# Patient Record
Sex: MALE | Race: BLACK OR AFRICAN AMERICAN | NOT HISPANIC OR LATINO | Employment: OTHER | URBAN - METROPOLITAN AREA
[De-identification: names, ages, dates, MRNs, and addresses within clinical notes are randomized per-mention and may not be internally consistent; named-entity substitution may affect disease eponyms.]

---

## 2017-01-13 ENCOUNTER — HISTORICAL (OUTPATIENT)
Dept: WOUND CARE | Facility: HOSPITAL | Age: 42
End: 2017-01-13

## 2017-02-03 ENCOUNTER — HISTORICAL (OUTPATIENT)
Dept: WOUND CARE | Facility: HOSPITAL | Age: 42
End: 2017-02-03

## 2017-05-08 ENCOUNTER — HISTORICAL (OUTPATIENT)
Dept: WOUND CARE | Facility: HOSPITAL | Age: 42
End: 2017-05-08

## 2017-05-26 ENCOUNTER — HISTORICAL (OUTPATIENT)
Dept: WOUND CARE | Facility: HOSPITAL | Age: 42
End: 2017-05-26

## 2017-06-09 ENCOUNTER — HISTORICAL (OUTPATIENT)
Dept: WOUND CARE | Facility: HOSPITAL | Age: 42
End: 2017-06-09

## 2017-06-30 ENCOUNTER — HISTORICAL (OUTPATIENT)
Dept: WOUND CARE | Facility: HOSPITAL | Age: 42
End: 2017-06-30

## 2017-07-21 ENCOUNTER — HISTORICAL (OUTPATIENT)
Dept: WOUND CARE | Facility: HOSPITAL | Age: 42
End: 2017-07-21

## 2017-09-08 ENCOUNTER — HISTORICAL (OUTPATIENT)
Dept: WOUND CARE | Facility: HOSPITAL | Age: 42
End: 2017-09-08

## 2017-09-22 ENCOUNTER — HISTORICAL (OUTPATIENT)
Dept: WOUND CARE | Facility: HOSPITAL | Age: 42
End: 2017-09-22

## 2017-10-20 ENCOUNTER — HISTORICAL (OUTPATIENT)
Dept: WOUND CARE | Facility: HOSPITAL | Age: 42
End: 2017-10-20

## 2017-11-13 ENCOUNTER — HISTORICAL (OUTPATIENT)
Dept: WOUND CARE | Facility: HOSPITAL | Age: 42
End: 2017-11-13

## 2018-02-22 ENCOUNTER — HISTORICAL (OUTPATIENT)
Dept: WOUND CARE | Facility: HOSPITAL | Age: 43
End: 2018-02-22

## 2018-03-09 ENCOUNTER — HISTORICAL (OUTPATIENT)
Dept: WOUND CARE | Facility: HOSPITAL | Age: 43
End: 2018-03-09

## 2018-04-06 ENCOUNTER — HISTORICAL (OUTPATIENT)
Dept: WOUND CARE | Facility: HOSPITAL | Age: 43
End: 2018-04-06

## 2018-04-20 ENCOUNTER — HISTORICAL (OUTPATIENT)
Dept: WOUND CARE | Facility: HOSPITAL | Age: 43
End: 2018-04-20

## 2018-05-09 ENCOUNTER — HISTORICAL (OUTPATIENT)
Dept: WOUND CARE | Facility: HOSPITAL | Age: 43
End: 2018-05-09

## 2018-05-25 ENCOUNTER — HISTORICAL (OUTPATIENT)
Dept: WOUND CARE | Facility: HOSPITAL | Age: 43
End: 2018-05-25

## 2018-06-22 ENCOUNTER — HISTORICAL (OUTPATIENT)
Dept: WOUND CARE | Facility: HOSPITAL | Age: 43
End: 2018-06-22

## 2018-07-13 ENCOUNTER — HISTORICAL (OUTPATIENT)
Dept: WOUND CARE | Facility: HOSPITAL | Age: 43
End: 2018-07-13

## 2018-08-03 ENCOUNTER — HISTORICAL (OUTPATIENT)
Dept: WOUND CARE | Facility: HOSPITAL | Age: 43
End: 2018-08-03

## 2018-08-17 ENCOUNTER — HISTORICAL (OUTPATIENT)
Dept: WOUND CARE | Facility: HOSPITAL | Age: 43
End: 2018-08-17

## 2018-08-31 ENCOUNTER — HISTORICAL (OUTPATIENT)
Dept: WOUND CARE | Facility: HOSPITAL | Age: 43
End: 2018-08-31

## 2018-09-21 ENCOUNTER — HISTORICAL (OUTPATIENT)
Dept: WOUND CARE | Facility: HOSPITAL | Age: 43
End: 2018-09-21

## 2018-10-05 ENCOUNTER — HISTORICAL (OUTPATIENT)
Dept: WOUND CARE | Facility: HOSPITAL | Age: 43
End: 2018-10-05

## 2018-10-19 ENCOUNTER — HISTORICAL (OUTPATIENT)
Dept: WOUND CARE | Facility: HOSPITAL | Age: 43
End: 2018-10-19

## 2018-11-16 ENCOUNTER — HISTORICAL (OUTPATIENT)
Dept: WOUND CARE | Facility: HOSPITAL | Age: 43
End: 2018-11-16

## 2018-12-07 ENCOUNTER — HISTORICAL (OUTPATIENT)
Dept: WOUND CARE | Facility: HOSPITAL | Age: 43
End: 2018-12-07

## 2018-12-28 ENCOUNTER — HISTORICAL (OUTPATIENT)
Dept: WOUND CARE | Facility: HOSPITAL | Age: 43
End: 2018-12-28

## 2019-01-18 ENCOUNTER — HISTORICAL (OUTPATIENT)
Dept: WOUND CARE | Facility: HOSPITAL | Age: 44
End: 2019-01-18

## 2019-02-08 ENCOUNTER — HISTORICAL (OUTPATIENT)
Dept: WOUND CARE | Facility: HOSPITAL | Age: 44
End: 2019-02-08

## 2019-02-22 ENCOUNTER — HISTORICAL (OUTPATIENT)
Dept: WOUND CARE | Facility: HOSPITAL | Age: 44
End: 2019-02-22

## 2019-03-22 ENCOUNTER — HISTORICAL (OUTPATIENT)
Dept: WOUND CARE | Facility: HOSPITAL | Age: 44
End: 2019-03-22

## 2019-04-12 ENCOUNTER — HISTORICAL (OUTPATIENT)
Dept: WOUND CARE | Facility: HOSPITAL | Age: 44
End: 2019-04-12

## 2019-05-03 ENCOUNTER — HISTORICAL (OUTPATIENT)
Dept: WOUND CARE | Facility: HOSPITAL | Age: 44
End: 2019-05-03

## 2019-05-17 ENCOUNTER — HISTORICAL (OUTPATIENT)
Dept: WOUND CARE | Facility: HOSPITAL | Age: 44
End: 2019-05-17

## 2019-05-31 ENCOUNTER — HISTORICAL (OUTPATIENT)
Dept: WOUND CARE | Facility: HOSPITAL | Age: 44
End: 2019-05-31

## 2019-06-14 ENCOUNTER — HISTORICAL (OUTPATIENT)
Dept: WOUND CARE | Facility: HOSPITAL | Age: 44
End: 2019-06-14

## 2019-07-26 ENCOUNTER — HISTORICAL (OUTPATIENT)
Dept: WOUND CARE | Facility: HOSPITAL | Age: 44
End: 2019-07-26

## 2019-09-13 ENCOUNTER — HISTORICAL (OUTPATIENT)
Dept: WOUND CARE | Facility: HOSPITAL | Age: 44
End: 2019-09-13

## 2020-01-13 ENCOUNTER — HISTORICAL (OUTPATIENT)
Dept: LAB | Facility: HOSPITAL | Age: 45
End: 2020-01-13

## 2020-01-13 LAB
ABS NEUT (OLG): 4.91 X10(3)/MCL (ref 2.1–9.2)
ALBUMIN SERPL-MCNC: 2.5 GM/DL (ref 3.4–5)
ALBUMIN/GLOB SERPL: 0.5 RATIO (ref 1.1–2)
ALP SERPL-CCNC: 96 UNIT/L (ref 50–136)
ALT SERPL-CCNC: 9 UNIT/L (ref 12–78)
ANISOCYTOSIS BLD QL SMEAR: 1
APPEARANCE, UA: ABNORMAL
AST SERPL-CCNC: 8 UNIT/L (ref 15–37)
BACTERIA SPEC CULT: ABNORMAL /HPF
BASOPHILS # BLD AUTO: 0 X10(3)/MCL (ref 0–0.2)
BASOPHILS NFR BLD AUTO: 0 %
BILIRUB SERPL-MCNC: 0.2 MG/DL (ref 0.2–1)
BILIRUB UR QL STRIP: NEGATIVE
BILIRUBIN DIRECT+TOT PNL SERPL-MCNC: 0.1 MG/DL (ref 0–0.5)
BILIRUBIN DIRECT+TOT PNL SERPL-MCNC: 0.1 MG/DL (ref 0–0.8)
BUN SERPL-MCNC: 8 MG/DL (ref 7–18)
CALCIUM SERPL-MCNC: 8.6 MG/DL (ref 8.5–10.1)
CHLORIDE SERPL-SCNC: 107 MMOL/L (ref 98–107)
CHOLEST SERPL-MCNC: 116 MG/DL (ref 0–200)
CHOLEST/HDLC SERPL: 2.3 {RATIO} (ref 0–5)
CO2 SERPL-SCNC: 25 MMOL/L (ref 21–32)
COLOR UR: YELLOW
CREAT SERPL-MCNC: 0.2 MG/DL (ref 0.7–1.3)
DEPRECATED CALCIDIOL+CALCIFEROL SERPL-MC: 12.65 NG/ML (ref 30–80)
EOSINOPHIL # BLD AUTO: 0.1 X10(3)/MCL (ref 0–0.9)
EOSINOPHIL NFR BLD AUTO: 1 %
ERYTHROCYTE [DISTWIDTH] IN BLOOD BY AUTOMATED COUNT: 21.4 % (ref 11.5–17)
EST. AVERAGE GLUCOSE BLD GHB EST-MCNC: <54 MG/DL
GLOBULIN SER-MCNC: 4.9 GM/DL (ref 2.4–3.5)
GLUCOSE (UA): NEGATIVE
GLUCOSE SERPL-MCNC: 76 MG/DL (ref 74–106)
HBA1C MFR BLD: <3.5 % (ref 4.2–6.3)
HCT VFR BLD AUTO: 31.1 % (ref 42–52)
HDLC SERPL-MCNC: 50 MG/DL (ref 35–60)
HGB BLD-MCNC: 8.1 GM/DL (ref 14–18)
HGB UR QL STRIP: ABNORMAL
HYPOCHROMIA BLD QL SMEAR: 1
IRON SERPL-MCNC: 16 MCG/DL (ref 50–175)
KETONES UR QL STRIP: NEGATIVE
LDLC SERPL CALC-MCNC: 48 MG/DL (ref 0–129)
LEUKOCYTE ESTERASE UR QL STRIP: ABNORMAL
LYMPHOCYTES # BLD AUTO: 2.1 X10(3)/MCL (ref 0.6–4.6)
LYMPHOCYTES NFR BLD AUTO: 27 %
MCH RBC QN AUTO: 16.6 PG (ref 27–31)
MCHC RBC AUTO-ENTMCNC: 26 GM/DL (ref 33–36)
MCV RBC AUTO: 63.9 FL (ref 80–94)
MICROCYTES BLD QL SMEAR: 1
MONOCYTES # BLD AUTO: 0.6 X10(3)/MCL (ref 0.1–1.3)
MONOCYTES NFR BLD AUTO: 7 %
NEUTROPHILS # BLD AUTO: 4.91 X10(3)/MCL (ref 2.1–9.2)
NEUTROPHILS NFR BLD AUTO: 64 %
NITRITE UR QL STRIP: NEGATIVE
PH UR STRIP: 8.5 [PH] (ref 5–9)
PLATELET # BLD AUTO: 784 X10(3)/MCL (ref 130–400)
PLATELET # BLD EST: ABNORMAL 10*3/UL
PMV BLD AUTO: 9 FL (ref 7.4–10.4)
POIKILOCYTOSIS BLD QL SMEAR: 1
POTASSIUM SERPL-SCNC: 5.1 MMOL/L (ref 3.5–5.1)
PROT SERPL-MCNC: 7.4 GM/DL (ref 6.4–8.2)
PROT UR QL STRIP: NEGATIVE
PSA SERPL-MCNC: 0.11 NG/ML (ref 0–4)
RBC # BLD AUTO: 4.87 X10(6)/MCL (ref 4.7–6.1)
RBC #/AREA URNS HPF: 6 /HPF (ref 0–2)
SODIUM SERPL-SCNC: 138 MMOL/L (ref 136–145)
SP GR UR STRIP: 1.01 (ref 1–1.03)
SQUAMOUS EPITHELIAL, UA: ABNORMAL
TRIGL SERPL-MCNC: 92 MG/DL (ref 30–150)
TSH SERPL-ACNC: 1.05 MIU/L (ref 0.36–3.74)
UROBILINOGEN UR STRIP-ACNC: 0.2
VLDLC SERPL CALC-MCNC: 18 MG/DL
WBC # SPEC AUTO: 7.7 X10(3)/MCL (ref 4.5–11.5)
WBC #/AREA URNS HPF: 266 /HPF (ref 0–3)

## 2020-11-20 ENCOUNTER — HISTORICAL (OUTPATIENT)
Dept: WOUND CARE | Facility: HOSPITAL | Age: 45
End: 2020-11-20

## 2021-01-15 ENCOUNTER — HISTORICAL (OUTPATIENT)
Dept: WOUND CARE | Facility: HOSPITAL | Age: 46
End: 2021-01-15

## 2021-05-21 ENCOUNTER — HISTORICAL (OUTPATIENT)
Dept: WOUND CARE | Facility: HOSPITAL | Age: 46
End: 2021-05-21

## 2022-04-11 ENCOUNTER — HISTORICAL (OUTPATIENT)
Dept: ADMINISTRATIVE | Facility: HOSPITAL | Age: 47
End: 2022-04-11

## 2022-04-27 VITALS
OXYGEN SATURATION: 98 % | SYSTOLIC BLOOD PRESSURE: 98 MMHG | HEIGHT: 74 IN | DIASTOLIC BLOOD PRESSURE: 64 MMHG | BODY MASS INDEX: 19.95 KG/M2 | WEIGHT: 155.44 LBS

## 2022-05-05 NOTE — HISTORICAL [facility]ER
This is a historical note converted from Kerline. Formatting and pictures may have been removed.  Please reference Kerline for original formatting and attached multimedia. Chief Complaint  multiple wounds all over body  History of Present Illness  44-year-old?diplegic, who is here for follow-up of his chronic multiple?skin ulcers.? He has no new complaints.? Overall, his ulcers are improving.? He denies fever.  Review of Systems  Constitutional: Negative, other than HPI  Eye: Negative, other than HPI  Ear/Nose/Mouth/Throat: Negative, other than HPI  Respiratory: Negative, other than HPI  Cardiovascular: Negative, other than HPI  Gastrointestinal: Negative, other than HPI  : Negative, other than HPI  MS: Negative, other than HPI  Neuro: Negative, other than HPI  The review of systems is negative, otherwise.  Physical Exam  Vitals & Measurements  T:?36.8? ?C (Oral)? HR:?76(Peripheral)? RR:?20? BP:?153/71?  HT:?187?cm? WT:?79.4?kg?  Incision/Wounds  ?1. Back Midline Pressure ulcer?- last charted: 03/22/2019 12:00  ?? ??Assessment Done By?- Wound Care Team  ?? ??Dressing Type?- Gauze dressing, Petroleum gauze  ?? ??Dressing Assessment?- Drainage present, Intact  ?? ??Dressing Activity?- Changed  ?? ??Length?- 5.0 cm  ?? ??Width?- 6.0 cm  ?? ??Depth?- 0.3 cm  ?? ??Wound Bed Tissue Type?- Granulating, Necrotic tissue, slough  ?? ??Percent Granulated?- 50 %  ?? ??Percent Necrotic Tissue Slough?- 50 %  ?? ??Exudate Amount?- Moderate  ?? ??Exudate Type?- Serosanguineous  ?? ??Exudate Odor?- None  ?? ??Edge?- Attached to wound bed  ?? ??Status?- Improving  ?  ?2. Right Other: Forearm Pressure ulcer?- last charted: 03/22/2019 12:00  ?? ??Assessment Done By?- Wound Care Team  ?? ??Pressure Point?- Bony prominence  ?? ??Dressing Type?- Gauze dressing, Petroleum gauze  ?? ??Dressing Assessment?- Drainage present, Intact  ?? ??Dressing Activity?- Changed  ?? ??Cleansing?- Cleaned with normal saline  ?? ??Length?- 14.5 cm  ??  ??Width?- 3.5 cm  ?? ??Depth?- 0.4 cm  ?? ??Wound Bed Tissue Type?- Granulating, Necrotic tissue, slough  ?? ??Percent Granulated?- 75 %  ?? ??Percent Necrotic Tissue Slough?- 25 %  ?? ??Exudate Amount?- Small  ?? ??Exudate Type?- Serosanguineous  ?? ??Exudate Odor?- None  ?? ??Edge?- Attached to wound bed  ?? ??Status?- Improving  ?  ?3. Hip Left Pressure ulcer?- last charted: 03/22/2019 12:00  ?? ??Assessment Done By?- Wound Care Team  ?? ??Pressure Point?- Bony prominence  ?? ??Dressing Type?- Gauze dressing, Petroleum gauze  ?? ??Dressing Assessment?- Drainage present, Intact  ?? ??Dressing Activity?- Changed  ?? ??Cleansing?- Cleaned with normal saline  ?? ??Length?- 6.2 cm  ?? ??Width?- 7.0 cm  ?? ??Depth?- 0.4 cm  ?? ??Wound Bed Tissue Type?- Granulating, Necrotic tissue, slough  ?? ??Percent Granulated?- 75 %  ?? ??Percent Necrotic Tissue Slough?- 25 %  ?? ??Exudate Amount?- Moderate  ?? ??Exudate Type?- Serosanguineous  ?? ??Exudate Odor?- None  ?? ??Edge?- Attached to wound bed  ?? ??Status?- Improving  ?  ?4. Right Other: [place] Pressure ulcer?- last charted: 03/22/2019 12:00  ?? ??Assessment Done By?- Wound Care Team  ?? ??Pressure Point?- Bony prominence  ?? ??Dressing Type?- Gauze dressing, Petroleum gauze  ?? ??Dressing Assessment?- Drainage present, Intact  ?? ??Dressing Activity?- Changed  ?? ??Length?- 28.5 cm  ?? ??Width?- 4.5 cm  ?? ??Depth?- 0.5 cm  ?? ??Wound Bed Tissue Type?- Granulating  ?? ??Percent Granulated?- 100 %  ?? ??Exudate Amount?- Moderate  ?? ??Exudate Type?- Serosanguineous  ?? ??Exudate Odor?- None  ?? ??Edge?- Attached to wound bed  ?? ??Status?- Improving  ?  ?5. Left Other: [place] Pressure ulcer?- last charted: 03/22/2019 12:00  ?? ??Assessment Done By?- Wound Care Team  ?? ??Pressure Point?- Bony prominence  ?? ??Dressing Type?- Gauze dressing, Petroleum gauze  ?? ??Dressing Assessment?- Drainage present, Intact  ?? ??Dressing Activity?- Changed  ?? ??Length?- 6.9 cm  ??  ??Width?- 6.0 cm  ?? ??Depth?- 0.5 cm  ?? ??Wound Bed Tissue Type?- Granulating  ?? ??Percent Granulated?- 100 %  ?? ??Exudate Amount?- Moderate  ?? ??Exudate Type?- Serosanguineous  ?? ??Exudate Odor?- None  ?? ??Edge?- Attached to wound bed  ?? ??Status?- Improving  ?  ?6. Knee Right Pressure ulcer?- last charted: 03/22/2019 12:00  ?? ??Assessment Done By?- Wound Care Team  ?? ??Dressing Type?- Gauze dressing, Petroleum gauze  ?? ??Dressing Assessment?- Drainage present, Intact  ?? ??Dressing Activity?- Changed  ?? ??Cleansing?- Cleaned with soap and water  ?? ??Length?- 2.5 cm  ?? ??Width?- 1.5 cm  ?? ??Depth?- 0.2 cm  ?? ??Wound Bed Tissue Type?- Granulating, Necrotic tissue, slough  ?? ??Percent Granulated?- 50 %  ?? ??Percent Necrotic Tissue Slough?- 50 %  ?? ??Exudate Amount?- Small  ?? ??Exudate Type?- Serosanguineous  ?? ??Exudate Odor?- None  ?? ??Edge?- Attached to wound bed  ?? ??Status?- Improving  ?  ?7. Foot Right Pressure ulcer?- last charted: 03/22/2019 12:00  ?? ??Assessment Done By?- Wound Care Team  ?? ??Dressing Type?- None  ?? ??Length?- 0 cm  ?? ??Width?- 0 cm  ?? ??Depth?- 0 cm  ?? ??Wound Bed Tissue Type?- Epithelialized  ?? ??Percent Epithelialized?- 100 %  ?? ??Status?- Healed  ?  ?8. Leg Left Pressure ulcer?- last charted: 03/22/2019 12:00  ?? ??Assessment Done By?- Wound Care Team  ?? ??Dressing Type?- Gauze dressing, Petroleum gauze  ?? ??Dressing Assessment?- Drainage present, Intact  ?? ??Dressing Activity?- Changed  ?? ??Length?- 2.8 cm  ?? ??Width?- 1.5 cm  ?? ??Depth?- 0.1 cm  ?? ??Wound Bed Tissue Type?- Granulating  ?? ??Percent Granulated?- 100 %  ?? ??Exudate Type?- Serosanguineous  ?? ??Exudate Odor?- None  ?? ??Edge?- Attached to wound bed  ?? ??Status?- Improving  ?  ?9. Elbow Right Pressure ulcer?- last charted: 03/22/2019 12:00  ?? ??Assessment Done By?- Wound Care Team  ?? ??Pressure Point?- Bony prominence  ?? ??Dressing Type?- Gauze dressing, Petroleum gauze  ??  ??Dressing Assessment?- Drainage present, Intact  ?? ??Dressing Activity?- Changed  ?? ??Cleansing?- Cleaned with normal saline  ?? ??Length?- 3.0 cm  ?? ??Width?- 1.5 cm  ?? ??Depth?- 0.2 cm  ?? ??Wound Bed Tissue Type?- Granulating, Necrotic tissue, slough  ?? ??Percent Granulated?- 90 %  ?? ??Percent Necrotic Tissue Slough?- 10 %  ?? ??Exudate Amount?- Moderate  ?? ??Exudate Type?- Serosanguineous  ?? ??Exudate Odor?- None  ?? ??Edge?- Attached to wound bed  ?? ??Status?- Improving  ?  ?  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 Santyl with topical antibiotics with 4 x 4s, tape to left hip wound daily. All of the wounds pat...  ?  Pressure ulcer of left hip, stage 3?L89.223  Ordered:  Wound Care Team Treatment, 03/22/19 13:26:00 CDT, Stop date 03/22/19 13:26:00 CDT, Right foot:, To use topical antibiotics with Mepitel nonstick with 4 x 4s, Kerlix, tape daily. Santyl with topical antibiotics with 4 x 4s, tape to left hip wound daily. All of the wounds pat...  ?  Pressure ulcer of other site, stage 2?L89.892  Ordered:  Wound Care Team Treatment, 03/22/19 13:26:00 CDT, Stop date 03/22/19 13:26:00 CDT, Right foot:, To use topical antibiotics with Mepitel nonstick with 4 x 4s, Kerlix, tape daily. Santyl with topical antibiotics with 4 x 4s, tape to left hip wound daily. All of the wounds pat...  ?  Pressure ulcer of other site, stage 3?L89.893  Ordered:  Wound Care Team Treatment, 03/22/19 13:26:00 CDT, Stop date 03/22/19 13:26:00 CDT, Right foot:, To use topical antibiotics with Mepitel nonstick with 4 x 4s, Kerlix, tape daily. Santyl with topical antibiotics with 4 x 4s, tape to left hip wound daily. All of the wounds pat...  ?  Pressure ulcer of right elbow, stage  3?L89.013  Ordered:  Wound Care Team Treatment, 03/22/19 13:26:00 CDT, Stop date 03/22/19 13:26:00 CDT, Right foot:, To use topical antibiotics with Mepitel nonstick with 4 x 4s, Kerlix, tape daily. Santyl with topical antibiotics with 4 x 4s, tape to left hip wound daily. All of the wounds pat...  ?  Quadriplegia, unspecified?G82.50  ? The Norco 10 mg?was refilled,?1 every 6 hours as needed.? He was given a prescription for 56 tablets.  Ordered:  Wound Care Team Treatment, 03/22/19 13:26:00 CDT, Stop date 03/22/19 13:26:00 CDT, Right foot:, To use topical antibiotics with Mepitel nonstick with 4 x 4s, Kerlix, tape daily. Santyl with topical antibiotics with 4 x 4s, tape to left hip wound daily. All of the wounds pat...  ?  Orders:  acetaminophen-HYDROcodone, 1 tab(s), Oral, q6hr, PRN PRN as needed for pain, X 14 day(s), # 56 tab(s), 0 Refill(s), Pharmacy: [id number]  Wound Care Outpatient, 04/05/19 10:30:00 CDT, Stop date 04/05/19 10:30:00 CDT  Wound Care Outpatient, *Est. 04/05/19 3:00:00 CDT, *Est. Stop date 04/05/19 3:00:00 CDT, [facility], Return to Clinic 2 weeks   Problem List/Past Medical History  Ongoing  Anxiety  Flu vaccine need  Hospital discharge follow-up  Insomnia(  Confirmed  )  Joint pain(  Confirmed  )  Lipid screening  Need for Tdap vaccination  Preventative health care  Screening PSA (prostate specific antigen)  Spinal cord injury(  Confirmed  )  Starvation-related malnutrition  UTI (urinary tract infection), bacterial  Historical  Acute URI  Anemia  Hypotension  Nausea  Paraplegia(  Confirmed  )  Quadriplegia  Procedure/Surgical History  Esophagogastroduodenoscopy (09/13/2016)  Debridement Sacral Decubitus (09/12/2016)  arm  Back Surgery  backside  Colostomy  foot  Hip surgery  neck  Neck Surgery  Peg placement & removal  Thigh Surgery  trach  Trach placement & reversal   Medications  baclofen 10 mg oral tablet, 10 mg= 1 tab(s), Oral, TID, PRN, 3 refills  Banophen  25 mg oral tablet, 25 mg= 1 tab(s), Oral, QID, PRN  Catheter Bags #[id number], See Instructions, 11 refills  Condom Cathether 35mm/Large REf #[id number]/[id number], See Instructions, 11 refills  Extention Tubing #[id number], See Instructions, 11 refills  Leg Bag #[id number], See Instructions, 11 refills  Minocin 100 mg oral capsule, 100 mg= 1 cap(s), Oral, BID, 5 refills  Norco 10 mg-325 mg oral tablet, 1 tab(s), Oral, q6hr, PRN  Ventolin HFA 90 mcg/inh inhalation aerosol, 2 puff(s), INH, q6hr, PRN  Vitamin C 500 mg oral tablet, 500 mg= 1 tab(s), Oral, Daily  Zinc, See Instructions  zolpidem 10 mg oral tablet, 10 mg= 1 tab(s), Oral, Once a day (at bedtime), PRN, 5 refills  Allergies  iodine  penicillins  sulfa drugs  Social History  Alcohol - Denies Alcohol Use, 08/13/2014  Never, 06/16/2015  Employment/School  disabled, 06/16/2015  Exercise  Home/Environment  Alcohol abuse in household: No. Substance abuse in household: No. Smoker in household: No. Injuries/Abuse/Neglect in household: No. Feels unsafe at home: No. Safe place to go: Yes. Agency(s)/Others notified: No. Family/Friends available for support: Yes. Concern for family members at home: No., 06/01/2016  Lives with family., 06/16/2015  Nutrition/Health  Regular, 06/16/2015  Sexual  Sexually active: No., 06/16/2015  Substance Abuse - Denies Substance Abuse, 08/13/2014  Never, 06/16/2015  Tobacco - Denies Tobacco Use, 08/13/2014  Never (less than 100 in lifetime), N/A, 03/22/2019  Former smoker, 06/01/2016  Family History  Diabetes mellitus type 2: Father.  Hypertension.: Mother, Father, Sister and Brother.  Hypothyroid: Sister.  Immunizations  Vaccine Date Status Comments   influenza virus vaccine, inactivated 10/22/2018 Given pt tolerated well   influenza virus vaccine, inactivated - Not Given Patient Refuses     tetanus/diphtheria/pertussis, acel(Tdap) - Not Given Patient Refuses     influenza virus vaccine, inactivated 10/22/2013 Recorded    Health Maintenance  Health  Maintenance  ???Pending?(in the next year)  ??? ??Due?  ??? ? ? ?Lipid Screening due??03/22/19??and every?  ??? ??Due In Future?  ??? ? ? ?Depression Screening not due until??04/23/19??and every 1??year(s)  ??? ? ? ?Alcohol Misuse Screening not due until??04/23/19??and every 1??year(s)  ??? ? ? ?Blood Pressure Screening not due until??10/22/19??and every 1??year(s)  ??? ? ? ?Body Mass Index Check not due until??10/22/19??and every 1??year(s)  ??? ? ? ?Obesity Screening not due until??10/22/19??and every 1??year(s)  ???Satisfied?(in the past 1 year)  ??? ??Satisfied?  ??? ? ? ?ADL Screening on??03/22/19.??Satisfied by Yuliya Ivy RN  ??? ? ? ?Alcohol Misuse Screening on??04/23/18.??Satisfied by Sarahy Ferris LPN  ??? ? ? ?Blood Pressure Screening on??03/22/19.??Satisfied by Yuliya Ivy RN  ??? ? ? ?Body Mass Index Check on??10/22/18.??Satisfied by Aline Jain  ??? ? ? ?Depression Screening on??04/23/18.??Satisfied by Sarahy Ferris LPN  ??? ? ? ?Influenza Vaccine on??10/22/18.??Satisfied by Ev Bradley  ??? ? ? ?Obesity Screening on??10/22/18.??Satisfied by Aline Jain  ?  ?

## 2022-05-05 NOTE — HISTORICAL [facility]
This is a historical note converted from [employer]. Formatting and pictures may have been removed.  Please reference [employer] for original formatting and attached multimedia. Chief Complaint  back, leg, foot, back, ischial wounds  History of Present Illness  43 yo male with bakc, leg, ishcial wounds.  Review of Systems  Constitutional:?no fever, fatigue, weakness  ENMT: no?nasal congestion/drainage  Respiratory:?no shortness of breath  Cardiovascular:?no chest pain, no edema  Gastrointestinal:?no nausea, vomiting  Hema/Lymph:?no abnormal bruising or bleeding  Musculoskeletal:?no muscle or joint pain, no joint swelling  Integumentary: bakc wound, ischial wound, elbow, arm, leg wounds  ?  ?  Physical Exam  Vitals & Measurements  T:?36.8? ?C (Oral)? HR:?73(Peripheral)? RR:?20? BP:?143/76?  HT:?187?cm? WT:?79.4?kg?  Incision/Wounds  ?1. Back Midline Pressure ulcer?- last charted: 04/12/2019 10:00  ?? ??Assessment Done By?- Wound Care Team  ?? ??Pressure Point?- Bony prominence  ?? ??Dressing Type?- Contact Layer, Gauze dressing  ?? ??Dressing Assessment?- Drainage present, Intact  ?? ??Dressing Activity?- Removed  ?? ??Cleansing?- Cleaned with normal saline  ?? ??Length?- 6.0 cm  ?? ??Width?- 4.0 cm  ?? ??Depth?- 0.2 cm  ?? ??Wound Bed Tissue Type?- Granulating  ?? ??Percent Granulated?- 100 %  ?? ??Pressure Ulcer Stage?- II  ?? ??Exudate Amount?- Small  ?? ??Exudate Type?- Serous  ?? ??Edge?- Attached to wound bed  ?? ??Status?- Improving  ?  ?2. Right Other: Forearm Pressure ulcer?- last charted: 04/12/2019 10:00  ?? ??Assessment Done By?- Wound Care Team  ?? ??Pressure Point?- Bony prominence  ?? ??Dressing Type?- Contact Layer, Gauze dressing  ?? ??Dressing Assessment?- Drainage present, Intact  ?? ??Dressing Activity?- Removed  ?? ??Cleansing?- Cleaned with normal saline  ?? ??Length?- 13.0 cm  ?? ??Width?- 1.5 cm  ?? ??Depth?- 0.3 cm  ?? ??Wound Bed Tissue Type?- Granulating, Necrotic tissue, slough  ?? ??Percent  Granulated?- 90 %  ?? ??Percent Necrotic Tissue Slough?- 10 %  ?? ??Pressure Ulcer Stage?- III  ?? ??Exudate Amount?- Moderate  ?? ??Exudate Type?- Serous  ?? ??Exudate Odor?- None  ?? ??Edge?- Attached to wound bed  ?? ??Status?- Improving  ?  ?3. Hip Left Pressure ulcer?- last charted: 04/12/2019 10:00  ?? ??Assessment Done By?- Wound Care Team  ?? ??Pressure Point?- Bony prominence  ?? ??Dressing Type?- Contact Layer, Gauze dressing  ?? ??Dressing Assessment?- Drainage present, Intact  ?? ??Dressing Activity?- Removed  ?? ??Cleansing?- Cleaned with normal saline  ?? ??Length?- 6.5 cm  ?? ??Width?- 7.2 cm  ?? ??Depth?- 0.3 cm  ?? ??Wound Bed Tissue Type?- Granulating, Necrotic tissue, slough  ?? ??Percent Granulated?- 75 %  ?? ??Percent Necrotic Tissue Slough?- 25 %  ?? ??Pressure Ulcer Stage?- III  ?? ??Exudate Amount?- Moderate  ?? ??Exudate Type?- Serous  ?? ??Exudate Odor?- None  ?? ??Edge?- Attached to wound bed  ?? ??Status?- Improving  ?  ?4. Right Other: Ischium Pressure ulcer?- last charted: 04/12/2019 10:00  ?? ??Assessment Done By?- Wound Care Team  ?? ??Pressure Point?- Bony prominence  ?? ??Dressing Type?- Contact Layer, Gauze dressing  ?? ??Dressing Assessment?- Drainage present, Intact  ?? ??Dressing Activity?- Removed  ?? ??Cleansing?- Cleaned with normal saline  ?? ??Length?- 31.5 cm  ?? ??Width?- 6.0 cm  ?? ??Depth?- 0.4 cm  ?? ??Wound Bed Tissue Type?- Granulating, Necrotic tissue, slough  ?? ??Percent Granulated?- 90 %  ?? ??Percent Necrotic Tissue Slough?- 10 %  ?? ??Pressure Ulcer Stage?- IV  ?? ??Exudate Amount?- Moderate  ?? ??Exudate Type?- Serous  ?? ??Exudate Odor?- None  ?? ??Edge?- Attached to wound bed  ?? ??Status?- Improving  ?  ?5. Left Other: Ischium Pressure ulcer?- last charted: 04/12/2019 10:00  ?? ??Assessment Done By?- Wound Care Team  ?? ??Pressure Point?- Bony prominence  ?? ??Dressing Type?- Contact Layer, Gauze dressing  ?? ??Dressing Assessment?- Drainage present,  Intact  ?? ??Dressing Activity?- Removed  ?? ??Cleansing?- Cleaned with normal saline  ?? ??Length?- 6.0 cm  ?? ??Width?- 11.0 cm  ?? ??Depth?- 0.3 cm  ?? ??Wound Bed Tissue Type?- Granulating, Necrotic tissue, eschar  ?? ??Percent Granulated?- 50 %  ?? ??Percent Necrotic Tissue Eschar?- 50 %  ?? ??Pressure Ulcer Stage?- III  ?? ??Exudate Amount?- Moderate  ?? ??Exudate Type?- Serous  ?? ??Exudate Odor?- None  ?? ??Edge?- Attached to wound bed  ?? ??Status?- Improving  ?  ?6. Knee Right Pressure ulcer?- last charted: 04/12/2019 10:00  ?? ??Assessment Done By?- Wound Care Team  ?? ??Dressing Type?- Contact Layer, Gauze dressing  ?? ??Dressing Assessment?- Drainage present, Intact  ?? ??Dressing Activity?- Removed  ?? ??Cleansing?- Cleaned with normal saline  ?? ??Length?- 2.5 cm  ?? ??Width?- 1.8 cm  ?? ??Depth?- 0.3 cm  ?? ??Wound Bed Tissue Type?- Granulating  ?? ??Percent Granulated?- 100 %  ?? ??Exudate Amount?- Moderate  ?? ??Exudate Type?- Serous  ?? ??Exudate Odor?- None  ?? ??Edge?- Attached to wound bed  ?? ??Status?- Improving  ?  ?7. Leg Left Pressure ulcer?- last charted: 04/12/2019 10:00  ?? ??Assessment Done By?- Wound Care Team  ?? ??Pressure Point?- Bony prominence  ?? ??Dressing Type?- Contact Layer, Gauze dressing  ?? ??Dressing Assessment?- Drainage present, Intact  ?? ??Dressing Activity?- Removed  ?? ??Cleansing?- Cleaned with normal saline  ?? ??Length?- 1.5 cm  ?? ??Width?- 0.7 cm  ?? ??Depth?- 0.3 cm  ?? ??Wound Bed Tissue Type?- Granulating  ?? ??Percent Granulated?- 100 %  ?? ??Pressure Ulcer Stage?- II  ?? ??Exudate Amount?- Small  ?? ??Exudate Type?- Serous  ?? ??Exudate Odor?- None  ?? ??Edge?- Attached to wound bed  ?? ??Status?- Improving  ?  ?8. Elbow Right Pressure ulcer?- last charted: 04/12/2019 10:00  ?? ??Assessment Done By?- Wound Care Team  ?? ??Pressure Point?- Bony prominence  ?? ??Dressing Type?- Contact Layer, Gauze dressing  ?? ??Dressing Assessment?- Drainage present,  Intact  ?? ??Dressing Activity?- Removed  ?? ??Cleansing?- Cleaned with normal saline  ?? ??Length?- 2.0 cm  ?? ??Width?- 1.5 cm  ?? ??Depth?- 0.3 cm  ?? ??Wound Bed Tissue Type?- Hypergranular  ?? ??Percent Granulated?- 100 %  ?? ??Pressure Ulcer Stage?- III  ?? ??Exudate Amount?- Moderate  ?? ??Exudate Type?- Serous  ?? ??Exudate Odor?- None  ?? ??Edge?- Attached to wound bed  ?? ??Status?- Improving  Open wound on the right elbow is hyper granulated and silver nitrate is applied to?this clean wound.? Left hip pressure ulcer and left ischium?pressure ulcer or being treated with Santyl?and gauze dressing.? The remainder of his wounds are covered with?Vaseline gauze and AVD pads?with?gauze dressings.? All wounds are clean and without evidence of?slough so that no debridement?is required.? No sign of infection or drainage.  ?  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 Mepilex foam non border to all other? wounds with dry dressings daily. RTC 3 weeks.   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, stage 4  Pressure ulcer of left hip, stage 3  Preventative health care  Screening PSA (prostate specific antigen)  Spinal cord injury(  Confirmed  )  Starvation-related malnutrition  UTI (urinary tract infection), bacterial  Historical  Acute URI  Anemia  Hypotension  Nausea  Paraplegia(  Confirmed  )  Quadriplegia  Procedure/Surgical History  Debridement (eg, high pressure waterjet with/without suction,  sharp selective debridement with scissors, scalpel and forceps), open wound, (eg, fibrin, devitalized epidermis and/or dermis, exudate, debris, biofilm), including topical application(s), wound (02/08/2019)  Extraction of Left Upper Leg Skin, External Approach (02/08/2019)  Debridement, subcutaneous tissue (includes epidermis and dermis, if performed); first 20 sq cm or less (01/18/2019)  Excision of Left Upper Leg Subcutaneous Tissue and Fascia, Open Approach (01/18/2019)  Debridement (eg, high pressure waterjet with/without suction, sharp selective debridement with scissors, scalpel and forceps), open wound, (eg, fibrin, devitalized epidermis and/or dermis, exudate, debris, biofilm), including topical application(s), wound (12/28/2018)  Extraction of Left Upper Leg Skin, External Approach (12/28/2018)  Extraction of Right Foot Skin, External Approach (12/28/2018)  Extraction of Right Lower Arm Skin, External Approach (12/28/2018)  Debridement (eg, high pressure waterjet with/without suction, sharp selective debridement with scissors, scalpel and forceps), open wound, (eg, fibrin, devitalized epidermis and/or dermis, exudate, debris, biofilm), including topical application(s), wound (11/16/2018)  Extraction of Buttock Skin, External Approach (11/16/2018)  Extraction of Right Foot Skin, External Approach (11/16/2018)  Extraction of Right Lower Arm Skin, External Approach (11/16/2018)  Extraction of Right Lower Leg Skin, External Approach (11/16/2018)  Debridement (eg, high pressure waterjet with/without suction, sharp selective debridement with scissors, scalpel and forceps), open wound, (eg, fibrin, devitalized epidermis and/or dermis, exudate, debris, biofilm), including topical application(s), wound (10/19/2018)  Extraction of Buttock Skin, External Approach (10/19/2018)  Extraction of Right Foot Skin, External Approach (10/19/2018)  Extraction of Right Lower Leg Skin, External Approach (10/19/2018)  Debridement  (eg, high pressure waterjet with/without suction, sharp selective debridement with scissors, scalpel and forceps), open wound, (eg, fibrin, devitalized epidermis and/or dermis, exudate, debris, biofilm), including topical application(s), wound (10/05/2018)  Debridement (eg, high pressure waterjet with/without suction, sharp selective debridement with scissors, scalpel and forceps), open wound, (eg, fibrin, devitalized epidermis and/or dermis, exudate, debris, biofilm), including topical application(s), wound (10/05/2018)  Debridement (eg, high pressure waterjet with/without suction, sharp selective debridement with scissors, scalpel and forceps), open wound, (eg, fibrin, devitalized epidermis and/or dermis, exudate, debris, biofilm), including topical application(s), wound (10/05/2018)  Extraction of Back Skin, External Approach (10/05/2018)  Extraction of Chest Skin, External Approach (10/05/2018)  Extraction of Right Foot Skin, External Approach (10/05/2018)  Extraction of Right Lower Arm Skin, External Approach (10/05/2018)  Extraction of Right Upper Arm Skin, External Approach (10/05/2018)  Chemical cauterization of granulation tissue (ie, proud flesh) (07/13/2018)  Destruction of Back Skin, External Approach (07/13/2018)  Destruction of Chest Skin, External Approach (07/13/2018)  Destruction of Right Upper Arm Skin, External Approach (07/13/2018)  Debridement (eg, high pressure waterjet with/without suction, sharp selective debridement with scissors, scalpel and forceps), open wound, (eg, fibrin, devitalized epidermis and/or dermis, exudate, debris, biofilm), including topical application(s), wound (05/09/2018)  Extraction of Back Skin, External Approach (05/09/2018)  Extraction of Chest Skin, External Approach (05/09/2018)  Extraction of Right Upper Arm Skin, External Approach (05/09/2018)  Extraction of Right Upper Leg Skin, External Approach (05/09/2018)  Chemical cauterization of granulation tissue (ie,  proud flesh) (03/09/2018)  Destruction of Back Skin, External Approach (03/09/2018)  Destruction of Chest Skin, External Approach (03/09/2018)  Destruction of Right Upper Arm Skin, External Approach (03/09/2018)  Transfusion of Nonautologous Red Blood Cells into Peripheral Vein, Percutaneous Approach (01/24/2018)  Insertion of Infusion Device into Upper Vein, Percutaneous Approach (01/23/2018)  Chemical cauterization of granulation tissue (proud flesh, sinus or fistula) (11/13/2017)  Debridement (eg, high pressure waterjet with/without suction, sharp selective debridement with scissors, scalpel and forceps), open wound, (eg, fibrin, devitalized epidermis and/or dermis, exudate, debris, biofilm), including topical application(s), wound (11/13/2017)  Destruction of Back Skin, External Approach (11/13/2017)  Extraction of Left Lower Leg Skin, External Approach (11/13/2017)  Debridement (eg, high pressure waterjet with/without suction, sharp selective debridement with scissors, scalpel and forceps), open wound, (eg, fibrin, devitalized epidermis and/or dermis, exudate, debris, biofilm), including topical application(s), wound (10/20/2017)  Debridement (eg, high pressure waterjet with/without suction, sharp selective debridement with scissors, scalpel and forceps), open wound, (eg, fibrin, devitalized epidermis and/or dermis, exudate, debris, biofilm), including topical application(s), wound (10/20/2017)  Extraction of Back Skin, External Approach (10/20/2017)  Debridement (eg, high pressure waterjet with/without suction, sharp selective debridement with scissors, scalpel and forceps), open wound, (eg, fibrin, devitalized epidermis and/or dermis, exudate, debris, biofilm), including topical application(s), wound (09/22/2017)  Debridement (eg, high pressure waterjet with/without suction, sharp selective debridement with scissors, scalpel and forceps), open wound, (eg, fibrin, devitalized epidermis and/or dermis, exudate,  debris, biofilm), including topical application(s), wound (09/22/2017)  Debridement (eg, high pressure waterjet with/without suction, sharp selective debridement with scissors, scalpel and forceps), open wound, (eg, fibrin, devitalized epidermis and/or dermis, exudate, debris, biofilm), including topical application(s), wound (09/22/2017)  Debridement (eg, high pressure waterjet with/without suction, sharp selective debridement with scissors, scalpel and forceps), open wound, (eg, fibrin, devitalized epidermis and/or dermis, exudate, debris, biofilm), including topical application(s), wound (09/22/2017)  Debridement (eg, high pressure waterjet with/without suction, sharp selective debridement with scissors, scalpel and forceps), open wound, (eg, fibrin, devitalized epidermis and/or dermis, exudate, debris, biofilm), including topical application(s), wound (09/22/2017)  Debridement (eg, high pressure waterjet with/without suction, sharp selective debridement with scissors, scalpel and forceps), open wound, (eg, fibrin, devitalized epidermis and/or dermis, exudate, debris, biofilm), including topical application(s), wound (09/22/2017)  Debridement (eg, high pressure waterjet with/without suction, sharp selective debridement with scissors, scalpel and forceps), open wound, (eg, fibrin, devitalized epidermis and/or dermis, exudate, debris, biofilm), including topical application(s), wound (09/22/2017)  Debridement (eg, high pressure waterjet with/without suction, sharp selective debridement with scissors, scalpel and forceps), open wound, (eg, fibrin, devitalized epidermis and/or dermis, exudate, debris, biofilm), including topical application(s), wound (09/22/2017)  Debridement (eg, high pressure waterjet with/without suction, sharp selective debridement with scissors, scalpel and forceps), open wound, (eg, fibrin, devitalized epidermis and/or dermis, exudate, debris, biofilm), including topical application(s), wound  (09/22/2017)  Debridement, subcutaneous tissue (includes epidermis and dermis, if performed); each additional 20 sq cm, or part thereof (List separately in addition to code for primary procedure) (09/22/2017)  Debridement, subcutaneous tissue (includes epidermis and dermis, if performed); each additional 20 sq cm, or part thereof (List separately in addition to code for primary procedure) (09/22/2017)  Debridement, subcutaneous tissue (includes epidermis and dermis, if performed); each additional 20 sq cm, or part thereof (List separately in addition to code for primary procedure) (09/22/2017)  Debridement, subcutaneous tissue (includes epidermis and dermis, if performed); first 20 sq cm or less (09/22/2017)  Excision of Back Subcutaneous Tissue and Fascia, Open Approach (09/22/2017)  Extraction of Left Foot Skin, External Approach (09/22/2017)  Extraction of Right Lower Arm Skin, External Approach (09/22/2017)  Extraction of Right Upper Leg Skin, External Approach (09/22/2017)  Debridement (eg, high pressure waterjet with/without suction, sharp selective debridement with scissors, scalpel and forceps), open wound, (eg, fibrin, devitalized epidermis and/or dermis, exudate, debris, biofilm), including topical application(s), wound (09/08/2017)  Debridement (eg, high pressure waterjet with/without suction, sharp selective debridement with scissors, scalpel and forceps), open wound, (eg, fibrin, devitalized epidermis and/or dermis, exudate, debris, biofilm), including topical application(s), wound (09/08/2017)  Debridement (eg, high pressure waterjet with/without suction, sharp selective debridement with scissors, scalpel and forceps), open wound, (eg, fibrin, devitalized epidermis and/or dermis, exudate, debris, biofilm), including topical application(s), wound (09/08/2017)  Debridement (eg, high pressure waterjet with/without suction, sharp selective debridement with scissors, scalpel and forceps), open wound, (eg,  fibrin, devitalized epidermis and/or dermis, exudate, debris, biofilm), including topical application(s), wound (09/08/2017)  Extraction of Buttock Skin, External Approach (09/08/2017)  Extraction of Left Foot Skin, External Approach (09/08/2017)  Extraction of Left Upper Leg Skin, External Approach (09/08/2017)  Extraction of Right Lower Arm Skin, External Approach (09/08/2017)  Extraction of Right Upper Arm Skin, External Approach (09/08/2017)  Debridement (eg, high pressure waterjet with/without suction, sharp selective debridement with scissors, scalpel and forceps), open wound, (eg, fibrin, devitalized epidermis and/or dermis, exudate, debris, biofilm), including topical application(s), wound (07/21/2017)  Extraction of Left Foot Skin, External Approach (07/21/2017)  Chemical cauterization of granulation tissue (proud flesh, sinus or fistula) (06/30/2017)  Debridement (eg, high pressure waterjet with/without suction, sharp selective debridement with scissors, scalpel and forceps), open wound, (eg, fibrin, devitalized epidermis and/or dermis, exudate, debris, biofilm), including topical application(s), wound (06/30/2017)  Debridement, subcutaneous tissue (includes epidermis and dermis, if performed); first 20 sq cm or less (06/30/2017)  Destruction of Right Lower Arm Skin, External Approach (06/30/2017)  Excision of Back Subcutaneous Tissue and Fascia, Open Approach (06/30/2017)  Extraction of Buttock Skin, External Approach (06/30/2017)  Chemical cauterization of granulation tissue (proud flesh, sinus or fistula) (06/09/2017)  Debridement, subcutaneous tissue (includes epidermis and dermis, if performed); first 20 sq cm or less (06/09/2017)  Destruction of Buttock Skin, External Approach (06/09/2017)  Destruction of Left Upper Leg Skin, External Approach (06/09/2017)  Destruction of Right Upper Arm Skin, External Approach (06/09/2017)  Excision of Back Subcutaneous Tissue and Fascia, Open Approach  (06/09/2017)  Chemical cauterization of granulation tissue (proud flesh, sinus or fistula) (05/26/2017)  Debridement (eg, high pressure waterjet with/without suction, sharp selective debridement with scissors, scalpel and forceps), open wound, (eg, fibrin, devitalized epidermis and/or dermis, exudate, debris, biofilm), including topical application(s), wound (05/26/2017)  Destruction of Right Upper Arm Skin, External Approach (05/26/2017)  Extraction of Back Skin, External Approach (05/26/2017)  Transfusion of Nonautologous Red Blood Cells into Peripheral Vein, Percutaneous Approach (04/24/2017)  Debridement (eg, high pressure waterjet with/without suction, sharp selective debridement with scissors, scalpel and forceps), open wound, (eg, fibrin, devitalized epidermis and/or dermis, exudate, debris, biofilm), including topical application(s), wound (12/12/2016)  Extraction of Right Lower Arm Skin, External Approach (12/12/2016)  Debridement (eg, high pressure waterjet with/without suction, sharp selective debridement with scissors, scalpel and forceps), open wound, (eg, fibrin, devitalized epidermis and/or dermis, exudate, debris, biofilm), including topical application(s), wound (11/23/2016)  Debridement (eg, high pressure waterjet with/without suction, sharp selective debridement with scissors, scalpel and forceps), open wound, (eg, fibrin, devitalized epidermis and/or dermis, exudate, debris, biofilm), including topical application(s), wound (11/23/2016)  Debridement (eg, high pressure waterjet with/without suction, sharp selective debridement with scissors, scalpel and forceps), open wound, (eg, fibrin, devitalized epidermis and/or dermis, exudate, debris, biofilm), including topical application(s), wound (11/23/2016)  Debridement (eg, high pressure waterjet with/without suction, sharp selective debridement with scissors, scalpel and forceps), open wound, (eg, fibrin, devitalized epidermis and/or dermis, exudate,  debris, biofilm), including topical application(s), wound (11/23/2016)  Debridement (eg, high pressure waterjet with/without suction, sharp selective debridement with scissors, scalpel and forceps), open wound, (eg, fibrin, devitalized epidermis and/or dermis, exudate, debris, biofilm), including topical application(s), wound (11/23/2016)  Debridement (eg, high pressure waterjet with/without suction, sharp selective debridement with scissors, scalpel and forceps), open wound, (eg, fibrin, devitalized epidermis and/or dermis, exudate, debris, biofilm), including topical application(s), wound (11/23/2016)  Debridement (eg, high pressure waterjet with/without suction, sharp selective debridement with scissors, scalpel and forceps), open wound, (eg, fibrin, devitalized epidermis and/or dermis, exudate, debris, biofilm), including topical application(s), wound (11/23/2016)  Extraction of Right Upper Leg Skin, External Approach (11/23/2016)  Debridement (eg, high pressure waterjet with/without suction, sharp selective debridement with scissors, scalpel and forceps), open wound, (eg, fibrin, devitalized epidermis and/or dermis, exudate, debris, biofilm), including topical application(s), wound (11/09/2016)  Debridement (eg, high pressure waterjet with/without suction, sharp selective debridement with scissors, scalpel and forceps), open wound, (eg, fibrin, devitalized epidermis and/or dermis, exudate, debris, biofilm), including topical application(s), wound (11/09/2016)  Debridement (eg, high pressure waterjet with/without suction, sharp selective debridement with scissors, scalpel and forceps), open wound, (eg, fibrin, devitalized epidermis and/or dermis, exudate, debris, biofilm), including topical application(s), wound (11/09/2016)  Extraction of Buttock Skin, External Approach (11/09/2016)  Esophagogastroduodenoscopy (09/13/2016)  Excision of Duodenum, Via Natural or Artificial Opening Endoscopic, Diagnostic  (09/13/2016)  Excision of Stomach, Pylorus, Via Natural or Artificial Opening Endoscopic, Diagnostic (09/13/2016)  Excision of Stomach, Via Natural or Artificial Opening Endoscopic, Diagnostic (09/13/2016)  Debridement Sacral Decubitus (09/12/2016)  Excision of Buttock Subcutaneous Tissue and Fascia, Open Approach (09/12/2016)  Excision of Right Upper Leg Subcutaneous Tissue and Fascia, Open Approach (09/12/2016)  Insertion of Infusion Device into Left Basilic Vein, Percutaneous Approach (09/09/2016)  Ultrasonography of Left Upper Extremity Veins, Guidance (09/09/2016)  Transfusion of Nonautologous Red Blood Cells into Peripheral Vein, Percutaneous Approach (09/08/2016)  Debridement (eg, high pressure waterjet with/without suction, sharp selective debridement with scissors, scalpel and forceps), open wound, (eg, fibrin, devitalized epidermis and/or dermis, exudate, debris, biofilm), including topical application(s), wound (09/02/2016)  Debridement, bone (includes epidermis, dermis, subcutaneous tissue, muscle and/or fascia, if performed); each additional 20 sq cm, or part thereof (List separately in addition to code for primary procedure) (09/02/2016)  Debridement, bone (includes epidermis, dermis, subcutaneous tissue, muscle and/or fascia, if performed); each additional 20 sq cm, or part thereof (List separately in addition to code for primary procedure) (09/02/2016)  Debridement, bone (includes epidermis, dermis, subcutaneous tissue, muscle and/or fascia, if performed); each additional 20 sq cm, or part thereof (List separately in addition to code for primary procedure) (09/02/2016)  Debridement, bone (includes epidermis, dermis, subcutaneous tissue, muscle and/or fascia, if performed); each additional 20 sq cm, or part thereof (List separately in addition to code for primary procedure) (09/02/2016)  Debridement, bone (includes epidermis, dermis, subcutaneous tissue, muscle and/or fascia, if performed); first 20  sq cm or less (09/02/2016)  Excision of Left Upper Femur, Open Approach (09/02/2016)  Excision of Right Pelvic Bone, Open Approach (09/02/2016)  Extraction of Left Foot Skin, External Approach (09/02/2016)  Chemical cauterization of granulation tissue (proud flesh, sinus or fistula) (08/22/2016)  Debridement (eg, high pressure waterjet with/without suction, sharp selective debridement with scissors, scalpel and forceps), open wound, (eg, fibrin, devitalized epidermis and/or dermis, exudate, debris, biofilm), including topical application(s), wound (08/22/2016)  Debridement (eg, high pressure waterjet with/without suction, sharp selective debridement with scissors, scalpel and forceps), open wound, (eg, fibrin, devitalized epidermis and/or dermis, exudate, debris, biofilm), including topical application(s), wound (08/22/2016)  Debridement (eg, high pressure waterjet with/without suction, sharp selective debridement with scissors, scalpel and forceps), open wound, (eg, fibrin, devitalized epidermis and/or dermis, exudate, debris, biofilm), including topical application(s), wound (08/22/2016)  Debridement (eg, high pressure waterjet with/without suction, sharp selective debridement with scissors, scalpel and forceps), open wound, (eg, fibrin, devitalized epidermis and/or dermis, exudate, debris, biofilm), including topical application(s), wound (08/22/2016)  Debridement (eg, high pressure waterjet with/without suction, sharp selective debridement with scissors, scalpel and forceps), open wound, (eg, fibrin, devitalized epidermis and/or dermis, exudate, debris, biofilm), including topical application(s), wound (08/22/2016)  Debridement (eg, high pressure waterjet with/without suction, sharp selective debridement with scissors, scalpel and forceps), open wound, (eg, fibrin, devitalized epidermis and/or dermis, exudate, debris, biofilm), including topical application(s), wound (08/22/2016)  Debridement (eg, high pressure  waterjet with/without suction, sharp selective debridement with scissors, scalpel and forceps), open wound, (eg, fibrin, devitalized epidermis and/or dermis, exudate, debris, biofilm), including topical application(s), wound (08/22/2016)  Destruction of Right Lower Arm Skin, External Approach (08/22/2016)  Extraction of Buttock Skin, External Approach (08/22/2016)  Extraction of Left Upper Leg Skin, External Approach (08/22/2016)  Debridement (eg, high pressure waterjet with/without suction, sharp selective debridement with scissors, scalpel and forceps), open wound, (eg, fibrin, devitalized epidermis and/or dermis, exudate, debris, biofilm), including topical application(s), wound (07/29/2016)  Debridement, subcutaneous tissue (includes epidermis and dermis, if performed); first 20 sq cm or less (07/29/2016)  Excision of Left Foot Subcutaneous Tissue and Fascia, Open Approach (07/29/2016)  Extraction of Right Lower Arm Skin, External Approach (07/29/2016)  Debridement (eg, high pressure waterjet with/without suction, sharp selective debridement with scissors, scalpel and forceps), open wound, (eg, fibrin, devitalized epidermis and/or dermis, exudate, debris, biofilm), including topical application(s), wound (05/27/2016)  Excision of Left Foot Skin, External Approach (05/27/2016)  Debridement (eg, high pressure waterjet with/without suction, sharp selective debridement with scissors, scalpel and forceps), open wound, (eg, fibrin, devitalized epidermis and/or dermis, exudate, debris, biofilm), including topical application(s), wound (05/06/2016)  Excision of Left Foot Skin, External Approach (05/06/2016)  Excisional debridement of wound, infection, or burn (03/09/2015)  Central Venous Catheter Placement with Guidance (08/13/2014)  arm  Back Surgery  backside  Colostomy  foot  Hip surgery  neck  Neck Surgery  Peg placement & removal  Thigh Surgery  trach  Trach placement & reversal   Medications  baclofen 10 mg oral  tablet, 10 mg= 1 tab(s), Oral, TID, PRN, 3 refills  Banophen 25 mg oral tablet, 25 mg= 1 tab(s), Oral, QID, PRN  Catheter Bags #[id number], See Instructions, 11 refills  Condom Cathether 35mm/Large REf #8400/8430, See Instructions, 11 refills  Extention Tubing #9346, See Instructions, 11 refills  Leg Bag #[id number], See Instructions, 11 refills  Minocin 100 mg oral capsule, 100 mg= 1 cap(s), Oral, BID, 5 refills  Santyl 250 units/g topical ointment, 1 efrain, TOP, Daily, 1 refills  Ventolin HFA 90 mcg/inh inhalation aerosol, 2 puff(s), INH, q6hr, PRN  Vitamin C 500 mg oral tablet, 500 mg= 1 tab(s), Oral, Daily  Zinc, See Instructions  zolpidem 10 mg oral tablet, 10 mg= 1 tab(s), Oral, Once a day (at bedtime), PRN, 5 refills  Allergies  iodine  penicillins  sulfa drugs  Social History  Alcohol - Denies Alcohol Use, 08/13/2014  Never, 06/16/2015  Employment/School  disabled, 06/16/2015  Exercise  Home/Environment  Alcohol abuse in household: No. Substance abuse in household: No. Smoker in household: No. Injuries/Abuse/Neglect in household: No. Feels unsafe at home: No. Safe place to go: Yes. Agency(s)/Others notified: No. Family/Friends available for support: Yes. Concern for family members at home: No., 06/01/2016  Lives with family., 06/16/2015  Nutrition/Health  Regular, 06/16/2015  Sexual  Sexually active: No., 06/16/2015  Substance Abuse - Denies Substance Abuse, 08/13/2014  Never, 06/16/2015  Tobacco - Denies Tobacco Use, 08/13/2014  Never (less than 100 in lifetime), N/A, 03/22/2019  Former smoker, 06/01/2016  Family History  Diabetes mellitus type 2: Father.  Hypertension.: Mother, Father, Sister and Brother.  Hypothyroid: Sister.  Immunizations  Vaccine Date Status Comments   influenza virus vaccine, inactivated 10/22/2018 Given pt tolerated well   influenza virus vaccine, inactivated - Not Given Patient Refuses     tetanus/diphtheria/pertussis, acel(Tdap) - Not Given Patient Refuses     influenza virus vaccine,  inactivated 10/22/2013 Recorded    Health Maintenance  Health Maintenance  ???Pending?(in the next year)  ??? ??Due?  ??? ? ? ?Lipid Screening due??04/12/19??and every?  ??? ??Due In Future?  ??? ? ? ?Depression Screening not due until??04/23/19??and every 1??year(s)  ??? ? ? ?Alcohol Misuse Screening not due until??04/23/19??and every 1??year(s)  ??? ? ? ?Obesity Screening not due until??10/22/19??and every 1??year(s)  ??? ? ? ?Blood Pressure Screening not due until??03/21/20??and every 1??year(s)  ??? ? ? ?Body Mass Index Check not due until??03/21/20??and every 1??year(s)  ??? ? ? ?ADL Screening not due until??03/22/20??and every 1??year(s)  ???Satisfied?(in the past 1 year)  ??? ??Satisfied?  ??? ? ? ?ADL Screening on??03/22/19.??Satisfied by Yuliya Ivy RN  ??? ? ? ?Alcohol Misuse Screening on??04/23/18.??Satisfied by Sarahy Ferris LPN  ??? ? ? ?Blood Pressure Screening on??04/12/19.??Satisfied by Yuliya Ivy RN  ??? ? ? ?Body Mass Index Check on??10/22/18.??Satisfied by Aline Jain  ??? ? ? ?Depression Screening on??04/23/18.??Satisfied by Sarahy Ferris LPN  ??? ? ? ?Influenza Vaccine on??10/22/18.??Satisfied by Ev Bradley  ??? ? ? ?Obesity Screening on??10/22/18.??Satisfied by Aline Jain  ?  ?

## 2022-05-05 NOTE — HISTORICAL [facility]
This is a historical note converted from [employer]. Formatting and pictures may have been removed.  Please reference [employer] for original formatting and attached multimedia. Chief Complaint  leg, hip, buttock, back, arm wounds  History of Present Illness  45 yo male with bilateral leg, back, ischial, buttock, hip, arm wounds.  Review of Systems  Constitutional:?no fever, fatigue, weakness  ENMT: no?nasal congestion/drainage  Respiratory:?no shortness of breath  Cardiovascular:?no chest pain, no edema  Gastrointestinal:?no nausea, vomiting  Hema/Lymph:?no abnormal bruising or bleeding  Musculoskeletal:?no muscle or joint pain, no joint swelling  Integumentary: bilateral leg wounds, back wound, arm, ischial, buttock, hip wounds  ?  ?  Physical Exam  Vitals & Measurements  T:?36.8? ?C (Oral)? HR:?76(Peripheral)? RR:?20? BP:?146/73?  HT:?187?cm? WT:?72.6?kg?  Incision/Wounds  ?1. Back Midline Pressure ulcer?- last charted: 05/17/2019 10:00  ?? ??Assessment Done By?- Wound Care Team  ?? ??Pressure Point?- Bony prominence  ?? ??Dressing Type?- Collagen, Gauze dressing  ?? ??Dressing Assessment?- Drainage present, Intact  ?? ??Dressing Activity?- Removed  ?? ??Cleansing?- Cleaned with normal saline  ?? ??Length?- 2.7 cm  ?? ??Width?- 2.5 cm  ?? ??Depth?- 0.2 cm  ?? ??Wound Bed Tissue Type?- Granulating  ?? ??Percent Granulated?- 100 %  ?? ??Pressure Ulcer Stage?- II  ?? ??Exudate Amount?- Moderate  ?? ??Exudate Type?- Serous  ?? ??Exudate Odor?- None  ?? ??Edge?- Attached to wound bed  ?? ??Status?- Improving  ?  ?2. Right Other: Forearm Pressure ulcer?- last charted: 05/17/2019 10:00  ?? ??Assessment Done By?- Wound Care Team  ?? ??Pressure Point?- Bony prominence  ?? ??Dressing Type?- Collagen  ?? ??Dressing Assessment?- Drainage present, Intact  ?? ??Dressing Activity?- Removed  ?? ??Cleansing?- Cleaned with normal saline  ?? ??Length?- 12.0 cm  ?? ??Width?- 1.9 cm  ?? ??Depth?- 0.3 cm  ?? ??Wound Bed Tissue Type?-  Granulating  ?? ??Percent Granulated?- 100 %  ?? ??Pressure Ulcer Stage?- III  ?? ??Exudate Amount?- Moderate  ?? ??Exudate Type?- Serous  ?? ??Exudate Odor?- None  ?? ??Edge?- Attached to wound b  ?  ?3. Hip Left Pressure ulcer?- last charted: 05/17/2019 10:00  ?? ??Assessment Done By?- Wound Care Team  ?? ??Dressing Type?- Collagen  ?? ??Dressing Assessment?- Drainage present, Intact  ?? ??Dressing Activity?- Removed  ?? ??Cleansing?- Cleaned with normal saline  ?? ??Length?- 5.5 cm  ?? ??Width?- 4.8 cm  ?? ??Depth?- 0.3 cm  ?? ??Wound Bed Tissue Type?- Granulating  ?? ??Percent Granulated?- 100 %  ?? ??Pressure Ulcer Stage?- III  ?? ??Exudate Amount?- Moderate  ?? ??Exudate Type?- Serous  ?? ??Exudate Odor?- None  ?? ??Edge?- Attached to wound bed  ?? ??Status?- Improving  ?  ?4. Right Other: Ischium Pressure ulcer?- last charted: 05/17/2019 10:00  ?? ??Assessment Done By?- Wound Care Team  ?? ??Pressure Point?- Bony prominence  ?? ??Dressing Type?- Collagen, Gauze dressing  ?? ??Dressing Assessment?- Drainage present, Intact  ?? ??Dressing Activity?- Removed  ?? ??Cleansing?- Cleaned with normal saline  ?? ??Length?- 27.5 cm  ?? ??Width?- 9.0 cm  ?? ??Depth?- 0.2 cm  ?? ??Wound Bed Tissue Type?- Granulating  ?? ??Percent Granulated?- 100 %  ?? ??Pressure Ulcer Stage?- IV  ?? ??Exudate Amount?- Moderate  ?? ??Exudate Type?- Serous  ?? ??Exudate Odor?- None  ?? ??Edge?- Attached to wound bed  ?? ??Status?- Improving  ?  ?5. Left Other: Ischium Pressure ulcer?- last charted: 05/17/2019 10:00  ?? ??Assessment Done By?- Wound Care Team  ?? ??Pressure Point?- Bony prominence  ?? ??Dressing Type?- Collagen, Gauze dressing  ?? ??Dressing Assessment?- Drainage present, Intact  ?? ??Dressing Activity?- Removed  ?? ??Cleansing?- Cleaned with normal saline  ?? ??Length?- 6.5 cm  ?? ??Width?- 12.0 cm  ?? ??Depth?- 1.5 cm  ?? ??Wound Bed Tissue Type?- Granulating  ?? ??Percent Granulated?- 100 %  ?? ??Pressure Ulcer Stage?-  III  ?? ??Exudate Amount?- Moderate  ?? ??Exudate Type?- Serous  ?? ??Exudate Odor?- None  ?? ??Edge?- Attached to wound bed  ?? ??Status?- Improving  ?  ?6. Knee Right Pressure ulcer?- last charted: 05/17/2019 10:00  ?? ??Assessment Done By?- Wound Care Team  ?? ??Dressing Type?- Collagen  ?? ??Dressing Assessment?- Drainage present, Intact  ?? ??Dressing Activity?- Removed  ?? ??Cleansing?- Cleaned with normal saline  ?? ??Length?- 1.8 cm  ?? ??Width?- 0.7 cm  ?? ??Depth?- 0.2 cm  ?? ??Wound Bed Tissue Type?- Granulating  ?? ??Percent Granulated?- 100 %  ?? ??Pressure Ulcer Stage?- II  ?? ??Exudate Amount?- Moderate  ?? ??Exudate Type?- Serous  ?? ??Exudate Odor?- None  ?? ??Edge?- Attached to wound bed  ?? ??Status?- Improving  ?  ?7. Leg Left Pressure ulcer?- last charted: 05/17/2019 10:00  ?? ??Assessment Done By?- Wound Care Team  ?? ??Dressing Type?- Collagen  ?? ??Dressing Assessment?- Drainage present, Intact  ?? ??Dressing Activity?- Removed  ?? ??Cleansing?- Cleaned with normal saline  ?? ??Length?- 2.3 cm  ?? ??Width?- 0.8 cm  ?? ??Depth?- 0.2 cm  ?? ??Wound Bed Tissue Type?- Granulating  ?? ??Percent Granulated?- 100 %  ?? ??Pressure Ulcer Stage?- II  ?? ??Exudate Amount?- Moderate  ?? ??Exudate Type?- Serous  ?? ??Exudate Odor?- None  ?? ??Edge?- Attached to wound bed  ?? ??Status?- Improving  ?  [place] is in this morning with?multiple wounds on the?legs perineum and chest wall?and right arm.? All of the wounds are amazingly clean?and well granulated.? Wiping with a curette shows no evidence of slough?on the wounds.??Distal leg wounds are very small?and well granulated.? The large right?ischial wound that runs?down?right posterior thigh?raising only clean?and well granulated.? Chest wall wounds, of which there are 2,?are almost closed.? The deep wound on the?right forearm?is likewise clean and well granulated.? Minimal?bleeding with?wiping with the gauze?to remove slough.? All wounds are covered with a  collagen dressing.  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, stage 4  Pressure ulcer of ischium, stage 3  Pressure ulcer of left hip, stage 3  Preventative health care  Screening PSA (prostate specific antigen)  Spinal cord injury(  Confirmed  )  UTI (urinary tract infection), bacterial  Historical  Acute URI  Anemia  Flu vaccine need  Hypotension  Nausea  Need for Tdap vaccination  Paraplegia(  Confirmed  )  Quadriplegia  Starvation-related malnutrition  Procedure/Surgical History  Chemical cauterization of granulation tissue (ie, proud flesh) (05/03/2019)  Destruction of Back Skin, External Approach (05/03/2019)  Destruction of Buttock Skin, External Approach (05/03/2019)  Destruction of Left Lower Leg Skin, External Approach (05/03/2019)  Destruction of Left Upper Leg Skin, External Approach (05/03/2019)  Destruction of Right Lower Arm Skin, External Approach (05/03/2019)  Destruction of Right Lower Leg Skin, External Approach (05/03/2019)  Debridement (eg, high pressure waterjet with/without suction, sharp selective debridement with scissors, scalpel and forceps), open wound, (eg, fibrin, devitalized epidermis and/or dermis, exudate, debris, biofilm), including topical application(s), wound (02/08/2019)  Extraction of Left Upper Leg Skin, External Approach (02/08/2019)  Debridement, subcutaneous tissue (includes epidermis and dermis, if performed);  first 20 sq cm or less (01/18/2019)  Excision of Left Upper Leg Subcutaneous Tissue and Fascia, Open Approach (01/18/2019)  Debridement (eg, high pressure waterjet with/without suction, sharp selective debridement with scissors, scalpel and forceps), open wound, (eg, fibrin, devitalized epidermis and/or dermis, exudate, debris, biofilm), including topical application(s), wound (12/28/2018)  Extraction of Left Upper Leg Skin, External Approach (12/28/2018)  Extraction of Right Foot Skin, External Approach (12/28/2018)  Extraction of Right Lower Arm Skin, External Approach (12/28/2018)  Debridement (eg, high pressure waterjet with/without suction, sharp selective debridement with scissors, scalpel and forceps), open wound, (eg, fibrin, devitalized epidermis and/or dermis, exudate, debris, biofilm), including topical application(s), wound (11/16/2018)  Extraction of Buttock Skin, External Approach (11/16/2018)  Extraction of Right Foot Skin, External Approach (11/16/2018)  Extraction of Right Lower Arm Skin, External Approach (11/16/2018)  Extraction of Right Lower Leg Skin, External Approach (11/16/2018)  Debridement (eg, high pressure waterjet with/without suction, sharp selective debridement with scissors, scalpel and forceps), open wound, (eg, fibrin, devitalized epidermis and/or dermis, exudate, debris, biofilm), including topical application(s), wound (10/19/2018)  Extraction of Buttock Skin, External Approach (10/19/2018)  Extraction of Right Foot Skin, External Approach (10/19/2018)  Extraction of Right Lower Leg Skin, External Approach (10/19/2018)  Debridement (eg, high pressure waterjet with/without suction, sharp selective debridement with scissors, scalpel and forceps), open wound, (eg, fibrin, devitalized epidermis and/or dermis, exudate, debris, biofilm), including topical application(s), wound (10/05/2018)  Debridement (eg, high pressure waterjet with/without suction, sharp selective debridement with  scissors, scalpel and forceps), open wound, (eg, fibrin, devitalized epidermis and/or dermis, exudate, debris, biofilm), including topical application(s), wound (10/05/2018)  Debridement (eg, high pressure waterjet with/without suction, sharp selective debridement with scissors, scalpel and forceps), open wound, (eg, fibrin, devitalized epidermis and/or dermis, exudate, debris, biofilm), including topical application(s), wound (10/05/2018)  Extraction of Back Skin, External Approach (10/05/2018)  Extraction of Chest Skin, External Approach (10/05/2018)  Extraction of Right Foot Skin, External Approach (10/05/2018)  Extraction of Right Lower Arm Skin, External Approach (10/05/2018)  Extraction of Right Upper Arm Skin, External Approach (10/05/2018)  Chemical cauterization of granulation tissue (ie, proud flesh) (07/13/2018)  Destruction of Back Skin, External Approach (07/13/2018)  Destruction of Chest Skin, External Approach (07/13/2018)  Destruction of Right Upper Arm Skin, External Approach (07/13/2018)  Debridement (eg, high pressure waterjet with/without suction, sharp selective debridement with scissors, scalpel and forceps), open wound, (eg, fibrin, devitalized epidermis and/or dermis, exudate, debris, biofilm), including topical application(s), wound (05/09/2018)  Extraction of Back Skin, External Approach (05/09/2018)  Extraction of Chest Skin, External Approach (05/09/2018)  Extraction of Right Upper Arm Skin, External Approach (05/09/2018)  Extraction of Right Upper Leg Skin, External Approach (05/09/2018)  Chemical cauterization of granulation tissue (ie, proud flesh) (03/09/2018)  Destruction of Back Skin, External Approach (03/09/2018)  Destruction of Chest Skin, External Approach (03/09/2018)  Destruction of Right Upper Arm Skin, External Approach (03/09/2018)  Transfusion of Nonautologous Red Blood Cells into Peripheral Vein, Percutaneous Approach (01/24/2018)  Insertion of Infusion Device into Upper  Vein, Percutaneous Approach (01/23/2018)  Chemical cauterization of granulation tissue (proud flesh, sinus or fistula) (11/13/2017)  Debridement (eg, high pressure waterjet with/without suction, sharp selective debridement with scissors, scalpel and forceps), open wound, (eg, fibrin, devitalized epidermis and/or dermis, exudate, debris, biofilm), including topical application(s), wound (11/13/2017)  Destruction of Back Skin, External Approach (11/13/2017)  Extraction of Left Lower Leg Skin, External Approach (11/13/2017)  Debridement (eg, high pressure waterjet with/without suction, sharp selective debridement with scissors, scalpel and forceps), open wound, (eg, fibrin, devitalized epidermis and/or dermis, exudate, debris, biofilm), including topical application(s), wound (10/20/2017)  Debridement (eg, high pressure waterjet with/without suction, sharp selective debridement with scissors, scalpel and forceps), open wound, (eg, fibrin, devitalized epidermis and/or dermis, exudate, debris, biofilm), including topical application(s), wound (10/20/2017)  Extraction of Back Skin, External Approach (10/20/2017)  Debridement (eg, high pressure waterjet with/without suction, sharp selective debridement with scissors, scalpel and forceps), open wound, (eg, fibrin, devitalized epidermis and/or dermis, exudate, debris, biofilm), including topical application(s), wound (09/22/2017)  Debridement (eg, high pressure waterjet with/without suction, sharp selective debridement with scissors, scalpel and forceps), open wound, (eg, fibrin, devitalized epidermis and/or dermis, exudate, debris, biofilm), including topical application(s), wound (09/22/2017)  Debridement (eg, high pressure waterjet with/without suction, sharp selective debridement with scissors, scalpel and forceps), open wound, (eg, fibrin, devitalized epidermis and/or dermis, exudate, debris, biofilm), including topical application(s), wound (09/22/2017)  Debridement  (eg, high pressure waterjet with/without suction, sharp selective debridement with scissors, scalpel and forceps), open wound, (eg, fibrin, devitalized epidermis and/or dermis, exudate, debris, biofilm), including topical application(s), wound (09/22/2017)  Debridement (eg, high pressure waterjet with/without suction, sharp selective debridement with scissors, scalpel and forceps), open wound, (eg, fibrin, devitalized epidermis and/or dermis, exudate, debris, biofilm), including topical application(s), wound (09/22/2017)  Debridement (eg, high pressure waterjet with/without suction, sharp selective debridement with scissors, scalpel and forceps), open wound, (eg, fibrin, devitalized epidermis and/or dermis, exudate, debris, biofilm), including topical application(s), wound (09/22/2017)  Debridement (eg, high pressure waterjet with/without suction, sharp selective debridement with scissors, scalpel and forceps), open wound, (eg, fibrin, devitalized epidermis and/or dermis, exudate, debris, biofilm), including topical application(s), wound (09/22/2017)  Debridement (eg, high pressure waterjet with/without suction, sharp selective debridement with scissors, scalpel and forceps), open wound, (eg, fibrin, devitalized epidermis and/or dermis, exudate, debris, biofilm), including topical application(s), wound (09/22/2017)  Debridement (eg, high pressure waterjet with/without suction, sharp selective debridement with scissors, scalpel and forceps), open wound, (eg, fibrin, devitalized epidermis and/or dermis, exudate, debris, biofilm), including topical application(s), wound (09/22/2017)  Debridement, subcutaneous tissue (includes epidermis and dermis, if performed); each additional 20 sq cm, or part thereof (List separately in addition to code for primary procedure) (09/22/2017)  Debridement, subcutaneous tissue (includes epidermis and dermis, if performed); each additional 20 sq cm, or part thereof (List separately in  addition to code for primary procedure) (09/22/2017)  Debridement, subcutaneous tissue (includes epidermis and dermis, if performed); each additional 20 sq cm, or part thereof (List separately in addition to code for primary procedure) (09/22/2017)  Debridement, subcutaneous tissue (includes epidermis and dermis, if performed); first 20 sq cm or less (09/22/2017)  Excision of Back Subcutaneous Tissue and Fascia, Open Approach (09/22/2017)  Extraction of Left Foot Skin, External Approach (09/22/2017)  Extraction of Right Lower Arm Skin, External Approach (09/22/2017)  Extraction of Right Upper Leg Skin, External Approach (09/22/2017)  Debridement (eg, high pressure waterjet with/without suction, sharp selective debridement with scissors, scalpel and forceps), open wound, (eg, fibrin, devitalized epidermis and/or dermis, exudate, debris, biofilm), including topical application(s), wound (09/08/2017)  Debridement (eg, high pressure waterjet with/without suction, sharp selective debridement with scissors, scalpel and forceps), open wound, (eg, fibrin, devitalized epidermis and/or dermis, exudate, debris, biofilm), including topical application(s), wound (09/08/2017)  Debridement (eg, high pressure waterjet with/without suction, sharp selective debridement with scissors, scalpel and forceps), open wound, (eg, fibrin, devitalized epidermis and/or dermis, exudate, debris, biofilm), including topical application(s), wound (09/08/2017)  Debridement (eg, high pressure waterjet with/without suction, sharp selective debridement with scissors, scalpel and forceps), open wound, (eg, fibrin, devitalized epidermis and/or dermis, exudate, debris, biofilm), including topical application(s), wound (09/08/2017)  Extraction of Buttock Skin, External Approach (09/08/2017)  Extraction of Left Foot Skin, External Approach (09/08/2017)  Extraction of Left Upper Leg Skin, External Approach (09/08/2017)  Extraction of Right Lower Arm Skin,  External Approach (09/08/2017)  Extraction of Right Upper Arm Skin, External Approach (09/08/2017)  Debridement (eg, high pressure waterjet with/without suction, sharp selective debridement with scissors, scalpel and forceps), open wound, (eg, fibrin, devitalized epidermis and/or dermis, exudate, debris, biofilm), including topical application(s), wound (07/21/2017)  Extraction of Left Foot Skin, External Approach (07/21/2017)  Chemical cauterization of granulation tissue (proud flesh, sinus or fistula) (06/30/2017)  Debridement (eg, high pressure waterjet with/without suction, sharp selective debridement with scissors, scalpel and forceps), open wound, (eg, fibrin, devitalized epidermis and/or dermis, exudate, debris, biofilm), including topical application(s), wound (06/30/2017)  Debridement, subcutaneous tissue (includes epidermis and dermis, if performed); first 20 sq cm or less (06/30/2017)  Destruction of Right Lower Arm Skin, External Approach (06/30/2017)  Excision of Back Subcutaneous Tissue and Fascia, Open Approach (06/30/2017)  Extraction of Buttock Skin, External Approach (06/30/2017)  Chemical cauterization of granulation tissue (proud flesh, sinus or fistula) (06/09/2017)  Debridement, subcutaneous tissue (includes epidermis and dermis, if performed); first 20 sq cm or less (06/09/2017)  Destruction of Buttock Skin, External Approach (06/09/2017)  Destruction of Left Upper Leg Skin, External Approach (06/09/2017)  Destruction of Right Upper Arm Skin, External Approach (06/09/2017)  Excision of Back Subcutaneous Tissue and Fascia, Open Approach (06/09/2017)  Chemical cauterization of granulation tissue (proud flesh, sinus or fistula) (05/26/2017)  Debridement (eg, high pressure waterjet with/without suction, sharp selective debridement with scissors, scalpel and forceps), open wound, (eg, fibrin, devitalized epidermis and/or dermis, exudate, debris, biofilm), including topical application(s), wound  (05/26/2017)  Destruction of Right Upper Arm Skin, External Approach (05/26/2017)  Extraction of Back Skin, External Approach (05/26/2017)  Transfusion of Nonautologous Red Blood Cells into Peripheral Vein, Percutaneous Approach (04/24/2017)  Debridement (eg, high pressure waterjet with/without suction, sharp selective debridement with scissors, scalpel and forceps), open wound, (eg, fibrin, devitalized epidermis and/or dermis, exudate, debris, biofilm), including topical application(s), wound (12/12/2016)  Extraction of Right Lower Arm Skin, External Approach (12/12/2016)  Debridement (eg, high pressure waterjet with/without suction, sharp selective debridement with scissors, scalpel and forceps), open wound, (eg, fibrin, devitalized epidermis and/or dermis, exudate, debris, biofilm), including topical application(s), wound (11/23/2016)  Debridement (eg, high pressure waterjet with/without suction, sharp selective debridement with scissors, scalpel and forceps), open wound, (eg, fibrin, devitalized epidermis and/or dermis, exudate, debris, biofilm), including topical application(s), wound (11/23/2016)  Debridement (eg, high pressure waterjet with/without suction, sharp selective debridement with scissors, scalpel and forceps), open wound, (eg, fibrin, devitalized epidermis and/or dermis, exudate, debris, biofilm), including topical application(s), wound (11/23/2016)  Debridement (eg, high pressure waterjet with/without suction, sharp selective debridement with scissors, scalpel and forceps), open wound, (eg, fibrin, devitalized epidermis and/or dermis, exudate, debris, biofilm), including topical application(s), wound (11/23/2016)  Debridement (eg, high pressure waterjet with/without suction, sharp selective debridement with scissors, scalpel and forceps), open wound, (eg, fibrin, devitalized epidermis and/or dermis, exudate, debris, biofilm), including topical application(s), wound (11/23/2016)  Debridement (eg,  high pressure waterjet with/without suction, sharp selective debridement with scissors, scalpel and forceps), open wound, (eg, fibrin, devitalized epidermis and/or dermis, exudate, debris, biofilm), including topical application(s), wound (11/23/2016)  Debridement (eg, high pressure waterjet with/without suction, sharp selective debridement with scissors, scalpel and forceps), open wound, (eg, fibrin, devitalized epidermis and/or dermis, exudate, debris, biofilm), including topical application(s), wound (11/23/2016)  Extraction of Right Upper Leg Skin, External Approach (11/23/2016)  Debridement (eg, high pressure waterjet with/without suction, sharp selective debridement with scissors, scalpel and forceps), open wound, (eg, fibrin, devitalized epidermis and/or dermis, exudate, debris, biofilm), including topical application(s), wound (11/09/2016)  Debridement (eg, high pressure waterjet with/without suction, sharp selective debridement with scissors, scalpel and forceps), open wound, (eg, fibrin, devitalized epidermis and/or dermis, exudate, debris, biofilm), including topical application(s), wound (11/09/2016)  Debridement (eg, high pressure waterjet with/without suction, sharp selective debridement with scissors, scalpel and forceps), open wound, (eg, fibrin, devitalized epidermis and/or dermis, exudate, debris, biofilm), including topical application(s), wound (11/09/2016)  Extraction of Buttock Skin, External Approach (11/09/2016)  Esophagogastroduodenoscopy (09/13/2016)  Excision of [place], Via Natural or Artificial Opening Endoscopic, Diagnostic (09/13/2016)  Excision of Stomach, Pylorus, Via Natural or Artificial Opening Endoscopic, Diagnostic (09/13/2016)  Excision of Stomach, Via Natural or Artificial Opening Endoscopic, Diagnostic (09/13/2016)  Debridement Sacral Decubitus (09/12/2016)  Excision of Buttock Subcutaneous Tissue and Fascia, Open Approach (09/12/2016)  Excision of Right Upper Leg  Subcutaneous Tissue and Fascia, Open Approach (09/12/2016)  Insertion of Infusion Device into Left Basilic Vein, Percutaneous Approach (09/09/2016)  Ultrasonography of Left Upper Extremity Veins, Guidance (09/09/2016)  Transfusion of Nonautologous Red Blood Cells into Peripheral Vein, Percutaneous Approach (09/08/2016)  Debridement (eg, high pressure waterjet with/without suction, sharp selective debridement with scissors, scalpel and forceps), open wound, (eg, fibrin, devitalized epidermis and/or dermis, exudate, debris, biofilm), including topical application(s), wound (09/02/2016)  Debridement, bone (includes epidermis, dermis, subcutaneous tissue, muscle and/or fascia, if performed); each additional 20 sq cm, or part thereof (List separately in addition to code for primary procedure) (09/02/2016)  Debridement, bone (includes epidermis, dermis, subcutaneous tissue, muscle and/or fascia, if performed); each additional 20 sq cm, or part thereof (List separately in addition to code for primary procedure) (09/02/2016)  Debridement, bone (includes epidermis, dermis, subcutaneous tissue, muscle and/or fascia, if performed); each additional 20 sq cm, or part thereof (List separately in addition to code for primary procedure) (09/02/2016)  Debridement, bone (includes epidermis, dermis, subcutaneous tissue, muscle and/or fascia, if performed); each additional 20 sq cm, or part thereof (List separately in addition to code for primary procedure) (09/02/2016)  Debridement, bone (includes epidermis, dermis, subcutaneous tissue, muscle and/or fascia, if performed); first 20 sq cm or less (09/02/2016)  Excision of Left Upper Femur, Open Approach (09/02/2016)  Excision of Right Pelvic Bone, Open Approach (09/02/2016)  Extraction of Left Foot Skin, External Approach (09/02/2016)  Chemical cauterization of granulation tissue (proud flesh, sinus or fistula) (08/22/2016)  Debridement (eg, high pressure waterjet with/without suction,  sharp selective debridement with scissors, scalpel and forceps), open wound, (eg, fibrin, devitalized epidermis and/or dermis, exudate, debris, biofilm), including topical application(s), wound (08/22/2016)  Debridement (eg, high pressure waterjet with/without suction, sharp selective debridement with scissors, scalpel and forceps), open wound, (eg, fibrin, devitalized epidermis and/or dermis, exudate, debris, biofilm), including topical application(s), wound (08/22/2016)  Debridement (eg, high pressure waterjet with/without suction, sharp selective debridement with scissors, scalpel and forceps), open wound, (eg, fibrin, devitalized epidermis and/or dermis, exudate, debris, biofilm), including topical application(s), wound (08/22/2016)  Debridement (eg, high pressure waterjet with/without suction, sharp selective debridement with scissors, scalpel and forceps), open wound, (eg, fibrin, devitalized epidermis and/or dermis, exudate, debris, biofilm), including topical application(s), wound (08/22/2016)  Debridement (eg, high pressure waterjet with/without suction, sharp selective debridement with scissors, scalpel and forceps), open wound, (eg, fibrin, devitalized epidermis and/or dermis, exudate, debris, biofilm), including topical application(s), wound (08/22/2016)  Debridement (eg, high pressure waterjet with/without suction, sharp selective debridement with scissors, scalpel and forceps), open wound, (eg, fibrin, devitalized epidermis and/or dermis, exudate, debris, biofilm), including topical application(s), wound (08/22/2016)  Debridement (eg, high pressure waterjet with/without suction, sharp selective debridement with scissors, scalpel and forceps), open wound, (eg, fibrin, devitalized epidermis and/or dermis, exudate, debris, biofilm), including topical application(s), wound (08/22/2016)  Destruction of Right Lower Arm Skin, External Approach (08/22/2016)  Extraction of Buttock Skin, External Approach  (08/22/2016)  Extraction of Left Upper Leg Skin, External Approach (08/22/2016)  Debridement (eg, high pressure waterjet with/without suction, sharp selective debridement with scissors, scalpel and forceps), open wound, (eg, fibrin, devitalized epidermis and/or dermis, exudate, debris, biofilm), including topical application(s), wound (07/29/2016)  Debridement, subcutaneous tissue (includes epidermis and dermis, if performed); first 20 sq cm or less (07/29/2016)  Excision of Left Foot Subcutaneous Tissue and Fascia, Open Approach (07/29/2016)  Extraction of Right Lower Arm Skin, External Approach (07/29/2016)  Debridement (eg, high pressure waterjet with/without suction, sharp selective debridement with scissors, scalpel and forceps), open wound, (eg, fibrin, devitalized epidermis and/or dermis, exudate, debris, biofilm), including topical application(s), wound (05/27/2016)  Excision of Left Foot Skin, External Approach (05/27/2016)  Debridement (eg, high pressure waterjet with/without suction, sharp selective debridement with scissors, scalpel and forceps), open wound, (eg, fibrin, devitalized epidermis and/or dermis, exudate, debris, biofilm), including topical application(s), wound (05/06/2016)  Excision of Left Foot Skin, External Approach (05/06/2016)  Excisional debridement of wound, infection, or burn (03/09/2015)  Central Venous Catheter Placement with Guidance (08/13/2014)  arm  Back Surgery  backside  Colostomy  foot  Hip surgery  neck  Neck Surgery  Peg placement & removal  Thigh Surgery  trach  Trach placement & reversal   Medications  baclofen 10 mg oral tablet, 10 mg= 1 tab(s), Oral, TID, PRN, 3 refills  Banophen 25 mg oral tablet, 25 mg= 1 tab(s), Oral, QID, PRN  Catheter Bags #[id number], See Instructions, 11 refills  Condom Cathether 35mm/Large REf #8400/8430, See Instructions, 11 refills  Extention Tubing #7046, See Instructions, 11 refills  Fergon 240 mg (27 mg elemental iron) oral tablet, 240 mg= 1  tab(s), Oral, Daily  Leg Bag #[id number], See Instructions, 11 refills  Minocin 100 mg oral capsule, 100 mg= 1 cap(s), Oral, BID, 5 refills  Norco 10 mg-325 mg oral tablet, 1 tab(s), Oral, q4hr, PRN  traZODONE 50 mg oral tablet ( Desyrel ), See Instructions, 4 refills  Ventolin HFA 90 mcg/inh inhalation aerosol, 2 puff(s), INH, q6hr, PRN  Vitamin C 500 mg oral tablet, 500 mg= 1 tab(s), Oral, Daily  Zinc, See Instructions  zolpidem 10 mg oral tablet, See Instructions  Allergies  iodine  penicillins  sulfa drugs  Social History  Alcohol - Denies Alcohol Use, 08/13/2014  Never, 06/16/2015  Employment/School  disabled, 06/16/2015  Exercise  Home/Environment  Alcohol abuse in household: No. Substance abuse in household: No. Smoker in household: No. Injuries/Abuse/Neglect in household: No. Feels unsafe at home: No. Safe place to go: Yes. Agency(s)/Others notified: No. Family/Friends available for support: Yes. Concern for family members at home: No., 06/01/2016  Lives with family., 06/16/2015  Nutrition/Health  Regular, 06/16/2015  Sexual  Sexually active: No., 06/16/2015  Substance Abuse - Denies Substance Abuse, 08/13/2014  Never, 06/16/2015  Tobacco - Denies Tobacco Use, 08/13/2014  Never (less than 100 in lifetime), N/A, 05/09/2019  Family History  Diabetes mellitus type 2: Father.  Hypertension.: Mother, Father, Sister and Brother.  Hypothyroid: Sister.  Immunizations  Vaccine Date Status Comments   influenza virus vaccine, inactivated 10/22/2018 Given pt tolerated well   influenza virus vaccine, inactivated - Not Given Patient Refuses     tetanus/diphtheria/pertussis, acel(Tdap) - Not Given Patient Refuses     influenza virus vaccine, inactivated 10/22/2013 Recorded    Health Maintenance  Health Maintenance  ???Pending?(in the next year)  ??? ??Due?  ??? ? ? ?Lipid Screening due??05/17/19??and every?  ??? ??Refused?  ??? ? ? ?Tetanus Vaccine due??05/17/19??and every 10??year(s)  ??? ??Due In Future?  ??? ? ? ?Alcohol  Misuse Screening not due until??01/01/20??and every 1??year(s)  ??? ? ? ?Obesity Screening not due until??01/01/20??and every 1??year(s)  ??? ? ? ?ADL Screening not due until??03/22/20??and every 1??year(s)  ??? ? ? ?Blood Pressure Screening not due until??05/08/20??and every 1??year(s)  ??? ? ? ?Body Mass Index Check not due until??05/08/20??and every 1??year(s)  ??? ? ? ?Depression Screening not due until??05/08/20??and every 1??year(s)  ???Satisfied?(in the past 1 year)  ??? ??Satisfied?  ??? ? ? ?ADL Screening on??03/22/19.??Satisfied by Yuliya Ivy RN  ??? ? ? ?Alcohol Misuse Screening on??05/09/19.??Satisfied by Sarahy Ferris LPN  ??? ? ? ?Blood Pressure Screening on??05/17/19.??Satisfied by Yuliya Ivy RN  ??? ? ? ?Body Mass Index Check on??05/09/19.??Satisfied by Sarahy Ferris LPN  ??? ? ? ?Depression Screening on??05/09/19.??Satisfied by Sarahy Ferris LPN  ??? ? ? ?Influenza Vaccine on??10/22/18.??Satisfied by Ev Bradley  ??? ? ? ?Obesity Screening on??05/09/19.??Satisfied by Sarahy Ferris LPN  ??? ??Refused?  ??? ? ? ?Tetanus Vaccine on??05/09/19.??Recorded by Sarahy Ferris LPN??Reason: Patient Refuses  ?  ?

## 2022-05-05 NOTE — HISTORICAL [facility]
This is a historical note converted from [employer]. Formatting and pictures may have been removed.  Please reference [employer] for original formatting and attached multimedia. Chief Complaint  multiple wounds scattered over body  History of Present Illness  45 yo male with multiple wounds scattered over his body.  Review of Systems  Constitutional:?no fever, fatigue, weakness  ENMT: no?nasal congestion/drainage  Respiratory:?no shortness of breath  Cardiovascular:?no chest pain, no edema  Gastrointestinal:?no nausea, vomiting  Hema/Lymph:?no abnormal bruising or bleeding  Musculoskeletal:?no muscle or joint pain, no joint swelling  Integumentary: back wound, bilateral ischial wounds, leg wounds, arm wound  ?  ?  Physical Exam  Vitals & Measurements  T:?36.8? ?C (Oral)? HR:?76(Peripheral)? RR:?20? BP:?153/76?  HT:?187?cm? WT:?79.4?kg?  Incision/Wounds  ?1. Back Midline Pressure ulcer?- last charted: 05/03/2019 10:00  ?? ??Assessment Done By?- Wound Care Team  ?? ??Pressure Point?- Bony prominence  ?? ??Dressing Type?- Gauze dressing, Nonadherent dressing  ?? ??Dressing Assessment?- Drainage present, Intact  ?? ??Dressing Activity?- Removed  ?? ??Cleansing?- Cleaned with normal saline  ?? ??Length?- 2.7 cm  ?? ??Width?- 2.2 cm  ?? ??Depth?- 0.2 cm  ?? ??Wound Bed Tissue Type?- Granulating  ?? ??Percent Granulated?- 100 %  ?? ??Pressure Ulcer Stage?- II  ?? ??Exudate Amount?- Small  ?? ??Exudate Type?- Serous  ?? ??Exudate Odor?- None  ?? ??Edge?- Attached to wound bed  ?? ??Status?- Improving  ?  ?2. Right Other: Forearm Pressure ulcer?- last charted: 05/03/2019 10:00  ?? ??Assessment Done By?- Wound Care Team  ?? ??Dressing Type?- Foam, Gauze dressing  ?? ??Dressing Assessment?- Drainage present, Intact  ?? ??Dressing Activity?- Removed  ?? ??Cleansing?- Cleaned with normal saline  ?? ??Length?- 13.0 cm  ?? ??Width?- 2.5 cm  ?? ??Depth?- 0.3 cm  ?? ??Wound Bed Tissue Type?- Granulating  ?? ??Percent Granulated?- 100 %  ??  ??Pressure Ulcer Stage?- III  ?? ??Exudate Amount?- Moderate  ?? ??Exudate Type?- Serous  ?? ??Exudate Odor?- None  ?? ??Edge?- Attached to wound bed  ?? ??Status?- Improving  ?  ?3. Hip Left Pressure ulcer?- last charted: 05/03/2019 10:00  ?? ??Assessment Done By?- Wound Care Team  ?? ??Pressure Point?- Bony prominence  ?? ??Dressing Type?- Gauze dressing, Nonadherent dressing  ?? ??Dressing Assessment?- Drainage present, Intact  ?? ??Dressing Activity?- Removed  ?? ??Cleansing?- Cleaned with normal saline  ?? ??Length?- 6.0 cm  ?? ??Width?- 7.8 cm  ?? ??Depth?- 0.2 cm  ?? ??Wound Bed Tissue Type?- Granulating  ?? ??Percent Granulated?- 100 %  ?? ??Pressure Ulcer Stage?- III  ?? ??Exudate Amount?- Moderate  ?? ??Exudate Type?- Serous  ?? ??Exudate Odor?- None  ?? ??Edge?- Attached to wound bed  ?? ??Status?- Improving  ?  ?4. Right Other: Ischium Pressure ulcer?- last charted: 05/03/2019 10:00  ?? ??Assessment Done By?- Wound Care Team  ?? ??Pressure Point?- Bony prominence  ?? ??Dressing Type?- Gauze dressing, Nonadherent dressing  ?? ??Dressing Assessment?- Drainage present, Intact  ?? ??Dressing Activity?- Removed  ?? ??Cleansing?- Cleaned with normal saline  ?? ??Length?- 29 cm  ?? ??Width?- 11.2 cm  ?? ??Depth?- 2.5 cm  ?? ??Wound Bed Tissue Type?- Granulating  ?? ??Percent Granulated?- 100 %  ?? ??Pressure Ulcer Stage?- IV  ?? ??Exudate Amount?- Moderate  ?? ??Exudate Type?- Serous  ?? ??Exudate Odor?- None  ?? ??Edge?- Attached to wound bed  ?? ??Status?- Improving  ?  ?5. Left Other: Ischium Pressure ulcer?- last charted: 05/03/2019 10:00  ?? ??Assessment Done By?- Wound Care Team  ?? ??Pressure Point?- Bony prominence  ?? ??Dressing Type?- Gauze dressing, Nonadherent dressing  ?? ??Dressing Assessment?- Drainage present, Intact  ?? ??Dressing Activity?- Removed  ?? ??Cleansing?- Cleaned with normal saline  ?? ??Length?- 9.5 cm  ?? ??Width?- 14.7 cm  ?? ??Depth?- 0.2 cm  ?? ??Wound Bed Tissue Type?-  Granulating  ?? ??Percent Granulated?- 100 %  ?? ??Pressure Ulcer Stage?- III  ?? ??Exudate Amount?- Moderate  ?? ??Exudate Type?- Serous  ?? ??Exudate Odor?- None  ?? ??Edge?- Attached to wound bed  ?? ??Status?- Improving  ?  ?6. Knee Right Pressure ulcer?- last charted: 05/03/2019 10:00  ?? ??Assessment Done By?- Wound Care Team  ?? ??Length?- 0 cm  ?? ??Width?- 0 cm  ?? ??Depth?- 0 cm  ?? ??Status?- Healed  ?  ?7. Leg Left Pressure ulcer?- last charted: 05/03/2019 10:00  ?? ??Assessment Done By?- Wound Care Team  ?? ??Pressure Point?- Bony prominence  ?? ??Dressing Type?- Foam, Nonadherent dressing  ?? ??Dressing Assessment?- Drainage present, Intact  ?? ??Dressing Activity?- Removed  ?? ??Cleansing?- Cleaned with normal saline  ?? ??Length?- 2.4 cm  ?? ??Width?- 1.0 cm  ?? ??Depth?- 0.1 cm  ?? ??Wound Bed Tissue Type?- Granulating  ?? ??Percent Granulated?- 100 %  ?? ??Pressure Ulcer Stage?- II  ?? ??Exudate Amount?- Moderate  ?? ??Exudate Type?- Serous  ?? ??Exudate Odor?- None  ?? ??Edge?- Attached to wound bed  ?? ??Status?- Improving  ?   ???? All wounds with exhuberant granulation tissue at the wound margins were?cauterized with silver nitrate.  ?  ?  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 Mepilex foam non bordered to all other wounds with dry dressings daily. RTC 2 weeks   Problem List/Past Medical History  Ongoing  Anxiety  Decreased D l  Flu vaccine need  Hospital discharge follow-up  Insomnia(  Confirmed  )  Joint pain(  Confirmed  )  Lipid screening  Need for Tdap vaccination  Pressure ulcer of contiguous region involving back and right  hip, stage 4  Pressure ulcer of ischium, stage 3  Pressure ulcer of left hip, stage 3  Preventative health care  Screening PSA (prostate specific antigen)  Spinal cord injury(  Confirmed  )  Starvation-related malnutrition  UTI (urinary tract infection), bacterial  Historical  Acute URI  Anemia  Hypotension  Nausea  Paraplegia(  Confirmed  )  Quadriplegia  Procedure/Surgical History  Debridement (eg, high pressure waterjet with/without suction, sharp selective debridement with scissors, scalpel and forceps), open wound, (eg, fibrin, devitalized epidermis and/or dermis, exudate, debris, biofilm), including topical application(s), wound (02/08/2019)  Extraction of Left Upper Leg Skin, External Approach (02/08/2019)  Debridement, subcutaneous tissue (includes epidermis and dermis, if performed); first 20 sq cm or less (01/18/2019)  Excision of Left Upper Leg Subcutaneous Tissue and Fascia, Open Approach (01/18/2019)  Debridement (eg, high pressure waterjet with/without suction, sharp selective debridement with scissors, scalpel and forceps), open wound, (eg, fibrin, devitalized epidermis and/or dermis, exudate, debris, biofilm), including topical application(s), wound (12/28/2018)  Extraction of Left Upper Leg Skin, External Approach (12/28/2018)  Extraction of Right Foot Skin, External Approach (12/28/2018)  Extraction of Right Lower Arm Skin, External Approach (12/28/2018)  Debridement (eg, high pressure waterjet with/without suction, sharp selective debridement with scissors, scalpel and forceps), open wound, (eg, fibrin, devitalized epidermis and/or dermis, exudate, debris, biofilm), including topical application(s), wound (11/16/2018)  Extraction of Buttock Skin, External Approach (11/16/2018)  Extraction of Right Foot Skin, External Approach (11/16/2018)  Extraction of Right Lower Arm Skin, External Approach (11/16/2018)  Extraction of Right Lower Leg Skin, External Approach (11/16/2018)  Debridement (eg,  high pressure waterjet with/without suction, sharp selective debridement with scissors, scalpel and forceps), open wound, (eg, fibrin, devitalized epidermis and/or dermis, exudate, debris, biofilm), including topical application(s), wound (10/19/2018)  Extraction of Buttock Skin, External Approach (10/19/2018)  Extraction of Right Foot Skin, External Approach (10/19/2018)  Extraction of Right Lower Leg Skin, External Approach (10/19/2018)  Debridement (eg, high pressure waterjet with/without suction, sharp selective debridement with scissors, scalpel and forceps), open wound, (eg, fibrin, devitalized epidermis and/or dermis, exudate, debris, biofilm), including topical application(s), wound (10/05/2018)  Debridement (eg, high pressure waterjet with/without suction, sharp selective debridement with scissors, scalpel and forceps), open wound, (eg, fibrin, devitalized epidermis and/or dermis, exudate, debris, biofilm), including topical application(s), wound (10/05/2018)  Debridement (eg, high pressure waterjet with/without suction, sharp selective debridement with scissors, scalpel and forceps), open wound, (eg, fibrin, devitalized epidermis and/or dermis, exudate, debris, biofilm), including topical application(s), wound (10/05/2018)  Extraction of Back Skin, External Approach (10/05/2018)  Extraction of Chest Skin, External Approach (10/05/2018)  Extraction of Right Foot Skin, External Approach (10/05/2018)  Extraction of Right Lower Arm Skin, External Approach (10/05/2018)  Extraction of Right Upper Arm Skin, External Approach (10/05/2018)  Chemical cauterization of granulation tissue (ie, proud flesh) (07/13/2018)  Destruction of Back Skin, External Approach (07/13/2018)  Destruction of Chest Skin, External Approach (07/13/2018)  Destruction of Right Upper Arm Skin, External Approach (07/13/2018)  Debridement (eg, high pressure waterjet with/without suction, sharp selective debridement with scissors, scalpel and  forceps), open wound, (eg, fibrin, devitalized epidermis and/or dermis, exudate, debris, biofilm), including topical application(s), wound (05/09/2018)  Extraction of Back Skin, External Approach (05/09/2018)  Extraction of Chest Skin, External Approach (05/09/2018)  Extraction of Right Upper Arm Skin, External Approach (05/09/2018)  Extraction of Right Upper Leg Skin, External Approach (05/09/2018)  Chemical cauterization of granulation tissue (ie, proud flesh) (03/09/2018)  Destruction of Back Skin, External Approach (03/09/2018)  Destruction of Chest Skin, External Approach (03/09/2018)  Destruction of Right Upper Arm Skin, External Approach (03/09/2018)  Transfusion of Nonautologous Red Blood Cells into Peripheral Vein, Percutaneous Approach (01/24/2018)  Insertion of Infusion Device into Upper Vein, Percutaneous Approach (01/23/2018)  Chemical cauterization of granulation tissue (proud flesh, sinus or fistula) (11/13/2017)  Debridement (eg, high pressure waterjet with/without suction, sharp selective debridement with scissors, scalpel and forceps), open wound, (eg, fibrin, devitalized epidermis and/or dermis, exudate, debris, biofilm), including topical application(s), wound (11/13/2017)  Destruction of Back Skin, External Approach (11/13/2017)  Extraction of Left Lower Leg Skin, External Approach (11/13/2017)  Debridement (eg, high pressure waterjet with/without suction, sharp selective debridement with scissors, scalpel and forceps), open wound, (eg, fibrin, devitalized epidermis and/or dermis, exudate, debris, biofilm), including topical application(s), wound (10/20/2017)  Debridement (eg, high pressure waterjet with/without suction, sharp selective debridement with scissors, scalpel and forceps), open wound, (eg, fibrin, devitalized epidermis and/or dermis, exudate, debris, biofilm), including topical application(s), wound (10/20/2017)  Extraction of Back Skin, External Approach (10/20/2017)  Debridement  (eg, high pressure waterjet with/without suction, sharp selective debridement with scissors, scalpel and forceps), open wound, (eg, fibrin, devitalized epidermis and/or dermis, exudate, debris, biofilm), including topical application(s), wound (09/22/2017)  Debridement (eg, high pressure waterjet with/without suction, sharp selective debridement with scissors, scalpel and forceps), open wound, (eg, fibrin, devitalized epidermis and/or dermis, exudate, debris, biofilm), including topical application(s), wound (09/22/2017)  Debridement (eg, high pressure waterjet with/without suction, sharp selective debridement with scissors, scalpel and forceps), open wound, (eg, fibrin, devitalized epidermis and/or dermis, exudate, debris, biofilm), including topical application(s), wound (09/22/2017)  Debridement (eg, high pressure waterjet with/without suction, sharp selective debridement with scissors, scalpel and forceps), open wound, (eg, fibrin, devitalized epidermis and/or dermis, exudate, debris, biofilm), including topical application(s), wound (09/22/2017)  Debridement (eg, high pressure waterjet with/without suction, sharp selective debridement with scissors, scalpel and forceps), open wound, (eg, fibrin, devitalized epidermis and/or dermis, exudate, debris, biofilm), including topical application(s), wound (09/22/2017)  Debridement (eg, high pressure waterjet with/without suction, sharp selective debridement with scissors, scalpel and forceps), open wound, (eg, fibrin, devitalized epidermis and/or dermis, exudate, debris, biofilm), including topical application(s), wound (09/22/2017)  Debridement (eg, high pressure waterjet with/without suction, sharp selective debridement with scissors, scalpel and forceps), open wound, (eg, fibrin, devitalized epidermis and/or dermis, exudate, debris, biofilm), including topical application(s), wound (09/22/2017)  Debridement (eg, high pressure waterjet with/without suction, sharp  selective debridement with scissors, scalpel and forceps), open wound, (eg, fibrin, devitalized epidermis and/or dermis, exudate, debris, biofilm), including topical application(s), wound (09/22/2017)  Debridement (eg, high pressure waterjet with/without suction, sharp selective debridement with scissors, scalpel and forceps), open wound, (eg, fibrin, devitalized epidermis and/or dermis, exudate, debris, biofilm), including topical application(s), wound (09/22/2017)  Debridement, subcutaneous tissue (includes epidermis and dermis, if performed); each additional 20 sq cm, or part thereof (List separately in addition to code for primary procedure) (09/22/2017)  Debridement, subcutaneous tissue (includes epidermis and dermis, if performed); each additional 20 sq cm, or part thereof (List separately in addition to code for primary procedure) (09/22/2017)  Debridement, subcutaneous tissue (includes epidermis and dermis, if performed); each additional 20 sq cm, or part thereof (List separately in addition to code for primary procedure) (09/22/2017)  Debridement, subcutaneous tissue (includes epidermis and dermis, if performed); first 20 sq cm or less (09/22/2017)  Excision of Back Subcutaneous Tissue and Fascia, Open Approach (09/22/2017)  Extraction of Left Foot Skin, External Approach (09/22/2017)  Extraction of Right Lower Arm Skin, External Approach (09/22/2017)  Extraction of Right Upper Leg Skin, External Approach (09/22/2017)  Debridement (eg, high pressure waterjet with/without suction, sharp selective debridement with scissors, scalpel and forceps), open wound, (eg, fibrin, devitalized epidermis and/or dermis, exudate, debris, biofilm), including topical application(s), wound (09/08/2017)  Debridement (eg, high pressure waterjet with/without suction, sharp selective debridement with scissors, scalpel and forceps), open wound, (eg, fibrin, devitalized epidermis and/or dermis, exudate, debris, biofilm), including  topical application(s), wound (09/08/2017)  Debridement (eg, high pressure waterjet with/without suction, sharp selective debridement with scissors, scalpel and forceps), open wound, (eg, fibrin, devitalized epidermis and/or dermis, exudate, debris, biofilm), including topical application(s), wound (09/08/2017)  Debridement (eg, high pressure waterjet with/without suction, sharp selective debridement with scissors, scalpel and forceps), open wound, (eg, fibrin, devitalized epidermis and/or dermis, exudate, debris, biofilm), including topical application(s), wound (09/08/2017)  Extraction of Buttock Skin, External Approach (09/08/2017)  Extraction of Left Foot Skin, External Approach (09/08/2017)  Extraction of Left Upper Leg Skin, External Approach (09/08/2017)  Extraction of Right Lower Arm Skin, External Approach (09/08/2017)  Extraction of Right Upper Arm Skin, External Approach (09/08/2017)  Debridement (eg, high pressure waterjet with/without suction, sharp selective debridement with scissors, scalpel and forceps), open wound, (eg, fibrin, devitalized epidermis and/or dermis, exudate, debris, biofilm), including topical application(s), wound (07/21/2017)  Extraction of Left Foot Skin, External Approach (07/21/2017)  Chemical cauterization of granulation tissue (proud flesh, sinus or fistula) (06/30/2017)  Debridement (eg, high pressure waterjet with/without suction, sharp selective debridement with scissors, scalpel and forceps), open wound, (eg, fibrin, devitalized epidermis and/or dermis, exudate, debris, biofilm), including topical application(s), wound (06/30/2017)  Debridement, subcutaneous tissue (includes epidermis and dermis, if performed); first 20 sq cm or less (06/30/2017)  Destruction of Right Lower Arm Skin, External Approach (06/30/2017)  Excision of Back Subcutaneous Tissue and Fascia, Open Approach (06/30/2017)  Extraction of Buttock Skin, External Approach (06/30/2017)  Chemical cauterization  of granulation tissue (proud flesh, sinus or fistula) (06/09/2017)  Debridement, subcutaneous tissue (includes epidermis and dermis, if performed); first 20 sq cm or less (06/09/2017)  Destruction of Buttock Skin, External Approach (06/09/2017)  Destruction of Left Upper Leg Skin, External Approach (06/09/2017)  Destruction of Right Upper Arm Skin, External Approach (06/09/2017)  Excision of Back Subcutaneous Tissue and Fascia, Open Approach (06/09/2017)  Chemical cauterization of granulation tissue (proud flesh, sinus or fistula) (05/26/2017)  Debridement (eg, high pressure waterjet with/without suction, sharp selective debridement with scissors, scalpel and forceps), open wound, (eg, fibrin, devitalized epidermis and/or dermis, exudate, debris, biofilm), including topical application(s), wound (05/26/2017)  Destruction of Right Upper Arm Skin, External Approach (05/26/2017)  Extraction of Back Skin, External Approach (05/26/2017)  Transfusion of Nonautologous Red Blood Cells into Peripheral Vein, Percutaneous Approach (04/24/2017)  Debridement (eg, high pressure waterjet with/without suction, sharp selective debridement with scissors, scalpel and forceps), open wound, (eg, fibrin, devitalized epidermis and/or dermis, exudate, debris, biofilm), including topical application(s), wound (12/12/2016)  Extraction of Right Lower Arm Skin, External Approach (12/12/2016)  Debridement (eg, high pressure waterjet with/without suction, sharp selective debridement with scissors, scalpel and forceps), open wound, (eg, fibrin, devitalized epidermis and/or dermis, exudate, debris, biofilm), including topical application(s), wound (11/23/2016)  Debridement (eg, high pressure waterjet with/without suction, sharp selective debridement with scissors, scalpel and forceps), open wound, (eg, fibrin, devitalized epidermis and/or dermis, exudate, debris, biofilm), including topical application(s), wound (11/23/2016)  Debridement (eg, high  pressure waterjet with/without suction, sharp selective debridement with scissors, scalpel and forceps), open wound, (eg, fibrin, devitalized epidermis and/or dermis, exudate, debris, biofilm), including topical application(s), wound (11/23/2016)  Debridement (eg, high pressure waterjet with/without suction, sharp selective debridement with scissors, scalpel and forceps), open wound, (eg, fibrin, devitalized epidermis and/or dermis, exudate, debris, biofilm), including topical application(s), wound (11/23/2016)  Debridement (eg, high pressure waterjet with/without suction, sharp selective debridement with scissors, scalpel and forceps), open wound, (eg, fibrin, devitalized epidermis and/or dermis, exudate, debris, biofilm), including topical application(s), wound (11/23/2016)  Debridement (eg, high pressure waterjet with/without suction, sharp selective debridement with scissors, scalpel and forceps), open wound, (eg, fibrin, devitalized epidermis and/or dermis, exudate, debris, biofilm), including topical application(s), wound (11/23/2016)  Debridement (eg, high pressure waterjet with/without suction, sharp selective debridement with scissors, scalpel and forceps), open wound, (eg, fibrin, devitalized epidermis and/or dermis, exudate, debris, biofilm), including topical application(s), wound (11/23/2016)  Extraction of Right Upper Leg Skin, External Approach (11/23/2016)  Debridement (eg, high pressure waterjet with/without suction, sharp selective debridement with scissors, scalpel and forceps), open wound, (eg, fibrin, devitalized epidermis and/or dermis, exudate, debris, biofilm), including topical application(s), wound (11/09/2016)  Debridement (eg, high pressure waterjet with/without suction, sharp selective debridement with scissors, scalpel and forceps), open wound, (eg, fibrin, devitalized epidermis and/or dermis, exudate, debris, biofilm), including topical application(s), wound (11/09/2016)  Debridement  (eg, high pressure waterjet with/without suction, sharp selective debridement with scissors, scalpel and forceps), open wound, (eg, fibrin, devitalized epidermis and/or dermis, exudate, debris, biofilm), including topical application(s), wound (11/09/2016)  Extraction of Buttock Skin, External Approach (11/09/2016)  Esophagogastroduodenoscopy (09/13/2016)  Excision of [place], Via Natural or Artificial Opening Endoscopic, Diagnostic (09/13/2016)  Excision of Stomach, Pylorus, Via Natural or Artificial Opening Endoscopic, Diagnostic (09/13/2016)  Excision of Stomach, Via Natural or Artificial Opening Endoscopic, Diagnostic (09/13/2016)  Debridement Sacral Decubitus (09/12/2016)  Excision of Buttock Subcutaneous Tissue and Fascia, Open Approach (09/12/2016)  Excision of Right Upper Leg Subcutaneous Tissue and Fascia, Open Approach (09/12/2016)  Insertion of Infusion Device into Left Basilic Vein, Percutaneous Approach (09/09/2016)  Ultrasonography of Left Upper Extremity Veins, Guidance (09/09/2016)  Transfusion of Nonautologous Red Blood Cells into Peripheral Vein, Percutaneous Approach (09/08/2016)  Debridement (eg, high pressure waterjet with/without suction, sharp selective debridement with scissors, scalpel and forceps), open wound, (eg, fibrin, devitalized epidermis and/or dermis, exudate, debris, biofilm), including topical application(s), wound (09/02/2016)  Debridement, bone (includes epidermis, dermis, subcutaneous tissue, muscle and/or fascia, if performed); each additional 20 sq cm, or part thereof (List separately in addition to code for primary procedure) (09/02/2016)  Debridement, bone (includes epidermis, dermis, subcutaneous tissue, muscle and/or fascia, if performed); each additional 20 sq cm, or part thereof (List separately in addition to code for primary procedure) (09/02/2016)  Debridement, bone (includes epidermis, dermis, subcutaneous tissue, muscle and/or fascia, if performed); each  additional 20 sq cm, or part thereof (List separately in addition to code for primary procedure) (09/02/2016)  Debridement, bone (includes epidermis, dermis, subcutaneous tissue, muscle and/or fascia, if performed); each additional 20 sq cm, or part thereof (List separately in addition to code for primary procedure) (09/02/2016)  Debridement, bone (includes epidermis, dermis, subcutaneous tissue, muscle and/or fascia, if performed); first 20 sq cm or less (09/02/2016)  Excision of Left Upper Femur, Open Approach (09/02/2016)  Excision of Right Pelvic Bone, Open Approach (09/02/2016)  Extraction of Left Foot Skin, External Approach (09/02/2016)  Chemical cauterization of granulation tissue (proud flesh, sinus or fistula) (08/22/2016)  Debridement (eg, high pressure waterjet with/without suction, sharp selective debridement with scissors, scalpel and forceps), open wound, (eg, fibrin, devitalized epidermis and/or dermis, exudate, debris, biofilm), including topical application(s), wound (08/22/2016)  Debridement (eg, high pressure waterjet with/without suction, sharp selective debridement with scissors, scalpel and forceps), open wound, (eg, fibrin, devitalized epidermis and/or dermis, exudate, debris, biofilm), including topical application(s), wound (08/22/2016)  Debridement (eg, high pressure waterjet with/without suction, sharp selective debridement with scissors, scalpel and forceps), open wound, (eg, fibrin, devitalized epidermis and/or dermis, exudate, debris, biofilm), including topical application(s), wound (08/22/2016)  Debridement (eg, high pressure waterjet with/without suction, sharp selective debridement with scissors, scalpel and forceps), open wound, (eg, fibrin, devitalized epidermis and/or dermis, exudate, debris, biofilm), including topical application(s), wound (08/22/2016)  Debridement (eg, high pressure waterjet with/without suction, sharp selective debridement with scissors, scalpel and  forceps), open wound, (eg, fibrin, devitalized epidermis and/or dermis, exudate, debris, biofilm), including topical application(s), wound (08/22/2016)  Debridement (eg, high pressure waterjet with/without suction, sharp selective debridement with scissors, scalpel and forceps), open wound, (eg, fibrin, devitalized epidermis and/or dermis, exudate, debris, biofilm), including topical application(s), wound (08/22/2016)  Debridement (eg, high pressure waterjet with/without suction, sharp selective debridement with scissors, scalpel and forceps), open wound, (eg, fibrin, devitalized epidermis and/or dermis, exudate, debris, biofilm), including topical application(s), wound (08/22/2016)  Destruction of Right Lower Arm Skin, External Approach (08/22/2016)  Extraction of Buttock Skin, External Approach (08/22/2016)  Extraction of Left Upper Leg Skin, External Approach (08/22/2016)  Debridement (eg, high pressure waterjet with/without suction, sharp selective debridement with scissors, scalpel and forceps), open wound, (eg, fibrin, devitalized epidermis and/or dermis, exudate, debris, biofilm), including topical application(s), wound (07/29/2016)  Debridement, subcutaneous tissue (includes epidermis and dermis, if performed); first 20 sq cm or less (07/29/2016)  Excision of Left Foot Subcutaneous Tissue and Fascia, Open Approach (07/29/2016)  Extraction of Right Lower Arm Skin, External Approach (07/29/2016)  Debridement (eg, high pressure waterjet with/without suction, sharp selective debridement with scissors, scalpel and forceps), open wound, (eg, fibrin, devitalized epidermis and/or dermis, exudate, debris, biofilm), including topical application(s), wound (05/27/2016)  Excision of Left Foot Skin, External Approach (05/27/2016)  Debridement (eg, high pressure waterjet with/without suction, sharp selective debridement with scissors, scalpel and forceps), open wound, (eg, fibrin, devitalized epidermis and/or dermis,  exudate, debris, biofilm), including topical application(s), wound (05/06/2016)  Excision of Left Foot Skin, External Approach (05/06/2016)  Excisional debridement of wound, infection, or burn (03/09/2015)  Central Venous Catheter Placement with Guidance (08/13/2014)  arm  Back Surgery  backside  Colostomy  foot  Hip surgery  neck  Neck Surgery  Peg placement & removal  Thigh Surgery  trach  Trach placement & reversal   Medications  baclofen 10 mg oral tablet, 10 mg= 1 tab(s), Oral, TID, PRN, 3 refills  Banophen 25 mg oral tablet, 25 mg= 1 tab(s), Oral, QID, PRN  Catheter Bags #[id number], See Instructions, 11 refills  Condom Cathether 35mm/Large REf #8400/8430, See Instructions, 11 refills  Extention Tubing #[id number], See Instructions, 11 refills  Leg Bag #[id number], See Instructions, 11 refills  Minocin 100 mg oral capsule, 100 mg= 1 cap(s), Oral, BID, 5 refills  Norco 10 mg-325 mg oral tablet, 1 tab(s), Oral, q4hr, PRN  traZODONE 50 mg oral tablet ( Desyrel ), See Instructions, 4 refills  Ventolin HFA 90 mcg/inh inhalation aerosol, 2 puff(s), INH, q6hr, PRN  Vitamin C 500 mg oral tablet, 500 mg= 1 tab(s), Oral, Daily  Zinc, See Instructions  zolpidem 10 mg oral tablet, See Instructions  Allergies  iodine  penicillins  sulfa drugs  Social History  Alcohol - Denies Alcohol Use, 08/13/2014  Never, 06/16/2015  Employment/School  disabled, 06/16/2015  Exercise  Home/Environment  Alcohol abuse in household: No. Substance abuse in household: No. Smoker in household: No. Injuries/Abuse/Neglect in household: No. Feels unsafe at home: No. Safe place to go: Yes. Agency(s)/Others notified: No. Family/Friends available for support: Yes. Concern for family members at home: No., 06/01/2016  Lives with family., 06/16/2015  Nutrition/Health  Regular, 06/16/2015  Sexual  Sexually active: No., 06/16/2015  Substance Abuse - Denies Substance Abuse, 08/13/2014  Never, 06/16/2015  Tobacco - Denies Tobacco Use, 08/13/2014  Never (less than 100 in  lifetime), N/A, 03/22/2019  Former smoker, 06/01/2016  Family History  Diabetes mellitus type 2: Father.  Hypertension.: Mother, Father, Sister and Brother.  Hypothyroid: Sister.  Immunizations  Vaccine Date Status Comments   influenza virus vaccine, inactivated 10/22/2018 Given pt tolerated well   influenza virus vaccine, inactivated - Not Given Patient Refuses     tetanus/diphtheria/pertussis, acel(Tdap) - Not Given Patient Refuses     influenza virus vaccine, inactivated 10/22/2013 Recorded    Health Maintenance  Health Maintenance  ???Pending?(in the next year)  ??? ??OverDue  ??? ? ? ?Alcohol Misuse Screening due??01/01/19??and every 1??year(s)  ??? ? ? ?Obesity Screening due??01/01/19??and every 1??year(s)  ??? ??Due?  ??? ? ? ?Depression Screening due??04/23/19??and every 1??year(s)  ??? ? ? ?Lipid Screening due??05/03/19??and every?  ??? ? ? ?Tetanus Vaccine due??05/03/19??and every 10??year(s)  ??? ??Due In Future?  ??? ? ? ?ADL Screening not due until??03/22/20??and every 1??year(s)  ??? ? ? ?Blood Pressure Screening not due until??04/11/20??and every 1??year(s)  ??? ? ? ?Body Mass Index Check not due until??04/11/20??and every 1??year(s)  ???Satisfied?(in the past 1 year)  ??? ??Satisfied?  ??? ? ? ?ADL Screening on??03/22/19.??Satisfied by Yuliya Ivy RN  ??? ? ? ?Blood Pressure Screening on??05/03/19.??Satisfied by Yuliya Ivy RN  ??? ? ? ?Body Mass Index Check on??10/22/18.??Satisfied by Aline Jain  ??? ? ? ?Influenza Vaccine on??10/22/18.??Satisfied by Bradley, Ev L  ??? ? ? ?Obesity Screening on??10/22/18.??Satisfied by Aline Jain  ?  ?

## 2022-05-13 ENCOUNTER — OFFICE VISIT (OUTPATIENT)
Dept: WOUND CARE | Facility: HOSPITAL | Age: 47
End: 2022-05-13
Attending: FAMILY MEDICINE
Payer: MEDICARE

## 2022-05-13 VITALS
HEART RATE: 76 BPM | HEIGHT: 73 IN | DIASTOLIC BLOOD PRESSURE: 78 MMHG | WEIGHT: 175 LBS | BODY MASS INDEX: 23.19 KG/M2 | RESPIRATION RATE: 18 BRPM | SYSTOLIC BLOOD PRESSURE: 128 MMHG

## 2022-05-13 DIAGNOSIS — L89.154 PRESSURE INJURY OF SACRAL REGION, STAGE 4: Primary | ICD-10-CM

## 2022-05-13 RX ORDER — ZOLPIDEM TARTRATE 10 MG/1
TABLET ORAL
COMMUNITY
Start: 2022-04-26 | End: 2022-07-28 | Stop reason: SDUPTHER

## 2022-05-13 RX ORDER — MINOCYCLINE HYDROCHLORIDE 100 MG/1
CAPSULE ORAL
COMMUNITY
Start: 2022-04-26 | End: 2022-09-22 | Stop reason: SDUPTHER

## 2022-05-13 RX ORDER — PROMETHAZINE HYDROCHLORIDE 6.25 MG/5ML
SYRUP ORAL
COMMUNITY
Start: 2022-05-02 | End: 2022-09-22 | Stop reason: SDUPTHER

## 2022-05-13 RX ORDER — HYDROCODONE BITARTRATE AND ACETAMINOPHEN 10; 325 MG/1; MG/1
1 TABLET ORAL EVERY 8 HOURS PRN
Status: DISCONTINUED | OUTPATIENT
Start: 2022-05-13 | End: 2022-05-13

## 2022-05-13 RX ORDER — BACLOFEN 10 MG/1
TABLET ORAL
COMMUNITY
Start: 2022-04-26 | End: 2022-07-27

## 2022-05-13 RX ORDER — TRAZODONE HYDROCHLORIDE 50 MG/1
TABLET ORAL
COMMUNITY
Start: 2022-04-26 | End: 2022-09-22 | Stop reason: SDUPTHER

## 2022-05-13 NOTE — PROGRESS NOTES
Mechanical/chemical debridement: 4x4 gauze, silver nitrate x5       **Cleanse with saline and apply xeroform to all open wounds. Cover with ABD pad/gentle foam boarder - change daily and/or PRN **

## 2022-05-13 NOTE — PROGRESS NOTES
Outpatient Wound Clinic Progress Note      Patient name: Modesto Payton MRN: [id number]   : 1975 PCP: Primary Doctor No     Subjective      Chief Complaint:   Chief Complaint   Patient presents with    Pressure Ulcer       HPI:   Artery 7-year-old  male, with a long history of quadriplegia, totally bedridden, is here for follow up of his multiple pressure ulcers.  There has been no significant change since his last visit.  He takes narcotics 3 times a day.  His sister is his caretaker.  He has been using the a low air loss mattress, and he has actually had some improvement of his multiple pressure ulcers.    Review of Systems   Constitutional: Negative.    Cardiovascular: Negative.    Skin:        As documented in the HPI.   Neurological: Negative.    All other systems reviewed and are negative.       Physical Exam  Vitals and nursing note reviewed. Exam conducted with a chaperone present.   Cardiovascular:      Rate and Rhythm: Normal rate.   Pulmonary:      Effort: Pulmonary effort is normal.   Skin:     General: Skin is warm and dry.      Comments: There are multiple pressure ulcers on his extremities, back, sacrum.  These have been assessed by the nurses today.   Neurological:      Mental Status: He is alert.      Comments: It has no motor function below his neck.              Objective    Medical History:  Problem List:  2022: Pressure injury of sacral region, stage 4          Wound Assessment         Altered Skin Integrity 22 1232 Right lower Leg #1 Other (comment) Full thickness tissue loss. Subcutaneous fat may be visible but bone, tendon or muscle are not exposed (Active)   22 1232   Altered Skin Integrity Present on Admission: yes   Side: Right   Orientation: lower   Location: Leg   Wound Number: #1   Is this injury device related?: No   Primary Wound Type: Other   Description of Altered Skin Integrity: Full thickness tissue loss. Subcutaneous fat may be visible but  bone, tendon or muscle are not exposed   Removal Indication and Assessment:    Wound Outcome:    (Retired) Wound Length (cm):    (Retired) Wound Width (cm):    (Retired) Depth (cm):    Wound Description (Comments):    Removal Indications:    Description of Altered Skin Integrity Full thickness tissue loss. Subcutaneous fat may be visible but bone, tendon or muscle are not exposed 05/13/22 1230   Dressing Appearance Moist drainage 05/13/22 1230   Drainage Amount Moderate 05/13/22 1230   Drainage Characteristics/Odor Serosanguineous 05/13/22 1230   Appearance Moist 05/13/22 1230   Tissue loss description Full thickness 05/13/22 1230   Periwound Area Moist 05/13/22 1230   Wound Edges Open 05/13/22 1230   Wound Length (cm) 2 cm 05/13/22 1230   Wound Width (cm) 2.5 cm 05/13/22 1230   Wound Depth (cm) 0.2 cm 05/13/22 1230   Wound Volume (cm^3) 1 cm^3 05/13/22 1230   Wound Surface Area (cm^2) 5 cm^2 05/13/22 1230   Care Cleansed with:;Wound cleanser 05/13/22 1230   Dressing Applied;Other (comment) 05/13/22 1230   Dressing Change Due 05/14/22 05/13/22 1230            Altered Skin Integrity 05/13/22 1237 Back #2 Other (comment) Full thickness tissue loss with exposed bone, tendon, or muscle. Often includes undermining and tunneling. May extend into muscle and/or supporting structures. (Active)   05/13/22 1237   Altered Skin Integrity Present on Admission: yes   Side:    Orientation:    Location: Back   Wound Number: #2   Is this injury device related?: No   Primary Wound Type: Other   Description of Altered Skin Integrity: Full thickness tissue loss with exposed bone, tendon, or muscle. Often includes undermining and tunneling. May extend into muscle and/or supporting structures.   Removal Indication and Assessment:    Wound Outcome:    (Retired) Wound Length (cm):    (Retired) Wound Width (cm):    (Retired) Depth (cm):    Wound Description (Comments):    Removal Indications:    Description of Altered Skin Integrity Full  thickness tissue loss with exposed bone, tendon, or muscle. Often includes undermining and tunneling. May extend into muscle and/or supporting structures. 05/13/22 1236   Dressing Appearance Moist drainage 05/13/22 1236   Drainage Amount Moderate 05/13/22 1236   Drainage Characteristics/Odor Serosanguineous 05/13/22 1236   Appearance Moist 05/13/22 1236   Tissue loss description Full thickness 05/13/22 1236   Periwound Area Moist 05/13/22 1236   Wound Edges Open 05/13/22 1236   Wound Length (cm) 18 cm 05/13/22 1236   Wound Width (cm) 10 cm 05/13/22 1236   Wound Depth (cm) 0.2 cm 05/13/22 1236   Wound Volume (cm^3) 36 cm^3 05/13/22 1236   Wound Surface Area (cm^2) 180 cm^2 05/13/22 1236   Care Cleansed with:;Wound cleanser 05/13/22 1236   Dressing Applied 05/13/22 1236   Dressing Change Due 05/14/22 05/13/22 1236            Altered Skin Integrity 05/13/22 1241 Right upper;posterior Arm #3 Other (comment) Full thickness tissue loss. Subcutaneous fat may be visible but bone, tendon or muscle are not exposed (Active)   05/13/22 1241   Altered Skin Integrity Present on Admission: yes   Side: Right   Orientation: upper;posterior   Location: Arm   Wound Number: #3   Is this injury device related?: No   Primary Wound Type: Other   Description of Altered Skin Integrity: Full thickness tissue loss. Subcutaneous fat may be visible but bone, tendon or muscle are not exposed   Removal Indication and Assessment:    Wound Outcome:    (Retired) Wound Length (cm):    (Retired) Wound Width (cm):    (Retired) Depth (cm):    Wound Description (Comments):    Removal Indications:    Description of Altered Skin Integrity Full thickness tissue loss. Subcutaneous fat may be visible but bone, tendon or muscle are not exposed 05/13/22 1241   Dressing Appearance Moist drainage 05/13/22 1241   Drainage Amount Moderate 05/13/22 1241   Drainage Characteristics/Odor Serosanguineous 05/13/22 1241   Appearance Moist 05/13/22 1241   Tissue loss  description Full thickness 05/13/22 1241   Periwound Area Moist 05/13/22 1241   Wound Edges Open 05/13/22 1241   Wound Length (cm) 2 cm 05/13/22 1241   Wound Width (cm) 1.2 cm 05/13/22 1241   Wound Depth (cm) 0.2 cm 05/13/22 1241   Wound Volume (cm^3) 0.48 cm^3 05/13/22 1241   Wound Surface Area (cm^2) 2.4 cm^2 05/13/22 1241   Care Cleansed with:;Wound cleanser 05/13/22 1241   Dressing Applied;Other (comment) 05/13/22 1241   Dressing Change Due 05/14/22 05/13/22 1241            Altered Skin Integrity 05/13/22 1247 Right posterior Thigh #4 Other (comment) Full thickness tissue loss. Subcutaneous fat may be visible but bone, tendon or muscle are not exposed (Active)   05/13/22 1247   Altered Skin Integrity Present on Admission: yes   Side: Right   Orientation: posterior   Location: Thigh   Wound Number: #4   Is this injury device related?: No   Primary Wound Type: Other   Description of Altered Skin Integrity: Full thickness tissue loss. Subcutaneous fat may be visible but bone, tendon or muscle are not exposed   Removal Indication and Assessment:    Wound Outcome:    (Retired) Wound Length (cm):    (Retired) Wound Width (cm):    (Retired) Depth (cm):    Wound Description (Comments):    Removal Indications:    Description of Altered Skin Integrity Full thickness tissue loss. Subcutaneous fat may be visible but bone, tendon or muscle are not exposed 05/13/22 1241   Dressing Appearance Moist drainage 05/13/22 1241   Drainage Amount Moderate 05/13/22 1241   Drainage Characteristics/Odor Serosanguineous 05/13/22 1241   Appearance Moist 05/13/22 1241   Tissue loss description Full thickness 05/13/22 1241   Periwound Area Moist 05/13/22 1241   Wound Edges Open 05/13/22 1241   Wound Length (cm) 16 cm 05/13/22 1241   Wound Width (cm) 7 cm 05/13/22 1241   Wound Depth (cm) 0.2 cm 05/13/22 1241   Wound Volume (cm^3) 22.4 cm^3 05/13/22 1241   Wound Surface Area (cm^2) 112 cm^2 05/13/22 1241   Care Cleansed with:;Wound cleanser  05/13/22 1241   Dressing Applied 05/13/22 1241   Dressing Change Due 05/14/22 05/13/22 1241            Altered Skin Integrity 05/13/22 0104 Left posterior Thigh #5 Other (comment) Partial thickness tissue loss. Shallow open ulcer with a red or pink wound bed, without slough. Intact or Open/Ruptured Serum-filled blister. (Active)   05/13/22 0104   Altered Skin Integrity Present on Admission: yes   Side: Left   Orientation: posterior   Location: Thigh   Wound Number: #5   Is this injury device related?: No   Primary Wound Type: Other   Description of Altered Skin Integrity: Partial thickness tissue loss. Shallow open ulcer with a red or pink wound bed, without slough. Intact or Open/Ruptured Serum-filled blister.   Removal Indication and Assessment:    Wound Outcome:    (Retired) Wound Length (cm):    (Retired) Wound Width (cm):    (Retired) Depth (cm):    Wound Description (Comments):    Removal Indications:    Description of Altered Skin Integrity Partial thickness tissue loss. Shallow open ulcer with a red or pink wound bed, without slough. Intact or Open/Ruptured Serum-filled blister. 05/13/22 1241   Dressing Appearance Moist drainage 05/13/22 1241   Drainage Amount Moderate 05/13/22 1241   Drainage Characteristics/Odor Serosanguineous 05/13/22 1241   Appearance Moist 05/13/22 1241   Tissue loss description Full thickness 05/13/22 1241   Periwound Area Moist 05/13/22 1241   Wound Edges Open 05/13/22 1241   Wound Length (cm) 5 cm 05/13/22 1241   Wound Width (cm) 2 cm 05/13/22 1241   Wound Depth (cm) 0.2 cm 05/13/22 1241   Wound Volume (cm^3) 2 cm^3 05/13/22 1241   Wound Surface Area (cm^2) 10 cm^2 05/13/22 1241   Care Cleansed with:;Wound cleanser 05/13/22 1241   Dressing Applied 05/13/22 1241   Dressing Change Due 05/14/22 05/13/22 1241            Altered Skin Integrity 05/13/22 0107 Sacral spine #6 Other (comment) Full thickness tissue loss with exposed bone, tendon, or muscle. Often includes undermining and  tunneling. May extend into muscle and/or supporting structures. (Active)   05/13/22 0107   Altered Skin Integrity Present on Admission: yes   Side:    Orientation:    Location: Sacral spine   Wound Number: #6   Is this injury device related?: No   Primary Wound Type: Other   Description of Altered Skin Integrity: Full thickness tissue loss with exposed bone, tendon, or muscle. Often includes undermining and tunneling. May extend into muscle and/or supporting structures.   Removal Indication and Assessment:    Wound Outcome:    (Retired) Wound Length (cm):    (Retired) Wound Width (cm):    (Retired) Depth (cm):    Wound Description (Comments):    Removal Indications:    Description of Altered Skin Integrity Full thickness tissue loss with exposed bone, tendon, or muscle. Often includes undermining and tunneling. May extend into muscle and/or supporting structures. 05/13/22 1241   Dressing Appearance Moist drainage 05/13/22 1241   Drainage Amount Moderate 05/13/22 1241   Drainage Characteristics/Odor Serosanguineous 05/13/22 1241   Appearance Moist 05/13/22 1241   Tissue loss description Full thickness 05/13/22 1241   Periwound Area Moist 05/13/22 1241   Wound Edges Open 05/13/22 1241   Wound Length (cm) 3 cm 05/13/22 1241   Wound Width (cm) 2 cm 05/13/22 1241   Wound Depth (cm) 0.2 cm 05/13/22 1241   Wound Volume (cm^3) 1.2 cm^3 05/13/22 1241   Wound Surface Area (cm^2) 6 cm^2 05/13/22 1241   Care Cleansed with:;Wound cleanser 05/13/22 1241   Dressing Applied 05/13/22 1241   Dressing Change Due 05/14/22 05/13/22 1241                   Procedures    5/13/2022  1:15 PM      Indication for procedure: Removal of specific targeted areas of devitalized or necrotic tissue or debris from wound bed.    Wound location:  See above    Equipment utilized:  Silver nitrate sticks     Procedure:  Chemical cauterization, treatment of hypergranulation    Anesthesia: none    Estimated blood loss: none    Complications: none    Patient  tolerated procedure: well    Wound dressing applied: Xeroform    Description of the procedure:  Informed consent was obtained pre-procedurally, with the risks, benefits and alteratives to the planned procedure explained at length to the patient, who expressed understanding and wished to proceed. A time out was observed and on the day of procedure the patient was correctly identified.    The wound was prepared initially by irrigating with wound cleanser.           IMPRESSION      1. DIAGNOSIS:  Modesto was seen today for pressure ulcer.    Diagnoses and all orders for this visit:    Pressure injury of sacral region, stage 4  -     Discontinue: HYDROcodone-acetaminophen  mg per tablet 1 tablet                        PLAN      #1 - Follow up x 3 weeks  #2 - Wound dressing:  **Cleanse with saline and apply xeroform to all open wounds. Cover with ABD pad/gentle foam boarder - change daily and/or PRN **            Electronically signed  Modesto Gonzalez MD

## 2022-06-03 ENCOUNTER — HOSPITAL ENCOUNTER (OUTPATIENT)
Dept: WOUND CARE | Facility: HOSPITAL | Age: 47
Discharge: HOME | End: 2022-06-03
Attending: FAMILY MEDICINE
Payer: MEDICARE

## 2022-06-03 VITALS
BODY MASS INDEX: 23.19 KG/M2 | WEIGHT: 175 LBS | RESPIRATION RATE: 24 BRPM | HEART RATE: 89 BPM | TEMPERATURE: 98 F | HEIGHT: 73 IN | DIASTOLIC BLOOD PRESSURE: 78 MMHG | SYSTOLIC BLOOD PRESSURE: 110 MMHG

## 2022-06-03 DIAGNOSIS — L89.154 PRESSURE INJURY OF SACRAL REGION, STAGE 4: Primary | ICD-10-CM

## 2022-06-03 DIAGNOSIS — L89.894 PRESSURE INJURY OF RIGHT LEG, STAGE 4: ICD-10-CM

## 2022-06-03 DIAGNOSIS — L89.894 PRESSURE INJURY OF LEFT LEG, STAGE 4: ICD-10-CM

## 2022-06-03 DIAGNOSIS — L89.114 PRESSURE INJURY OF RIGHT UPPER BACK, STAGE 4: ICD-10-CM

## 2022-06-03 PROCEDURE — 99213 OFFICE O/P EST LOW 20 MIN: CPT

## 2022-06-03 PROCEDURE — 17250 CHEM CAUT OF GRANLTJ TISSUE: CPT

## 2022-06-03 RX ORDER — SILVER NITRATE 38.21; 12.74 MG/1; MG/1
5 STICK TOPICAL
Status: COMPLETED | OUTPATIENT
Start: 2022-06-03 | End: 2022-06-03

## 2022-06-03 RX ADMIN — SILVER NITRATE 5 APPLICATOR: 38.21; 12.74 STICK TOPICAL at 12:06

## 2022-06-03 NOTE — PROGRESS NOTES
Chemical cauterization  Silver Nitrate  Sacrum  Right thigh  Right lower leg  Right Arm  Back        **Cleanse with saline and apply xeroform to all open wounds. Cover with ABD pad/gentle foam boarder - change daily and/or PRN **    Return in 3 weeks

## 2022-06-03 NOTE — PROGRESS NOTES
"Subjective:       Patient ID: Mod[place] Payton is a 47 y.o. male.    Chief Complaint: Pressure Ulcer (Pressure injuries sacrum stage 4)    HPI   47-year-old  male, a quadriplegia, who is here for follow up of his multiple pressure ulcers, on his sacrum, upper back, extremities.  Has no new complaints today.  He would like a refill on his hydrocodone.  Review of Systems   Constitutional: Negative.    Respiratory: Negative.    Cardiovascular: Negative.    Skin:        As documented in the HPI.   All other systems reviewed and are negative.      Objective:       BP  110/78      BP Location  Right arm     Patient Position  Lying     Pulse  89     Resp  24     Temp  98.2 F (36.8 C)     Temp src  Oral     Weight   79.4 kg (175 lb)     Height  6' 1" (1.854 m)     Pain Score  0-No pain     Other Vitals   BMI 23.09 kg/m2   BSA 2.02 m2          Physical Exam  Cardiovascular:      Rate and Rhythm: Normal rate.   Pulmonary:      Effort: Pulmonary effort is normal.   Skin:     General: Skin is warm and dry.      Comments: He has multiple pressure ulcers, which are improving slightly.   Wound 1., right lower leg, measuring 2.5 x 2.0 x 0.2 cm, treated with chemical cauterization.    Wound 2., upper back, measuring 19  by 14 x 0.2 cm, treated with chemical cauterization.  3. , right arm, measuring 1.5 x 1.5 x 0.2 cm, treated with chemical cauterization.  Wound 4., right thigh, measuring 18 x 5 x 0.2 cm  this was treated with chemical cauterization.  Wound 5., left thigh, measuring 3 x 1.2 x 0.2 cm.  This was treated with chemical cauterization.  Wound 6., sacrum, measuring 3 x 1.5 x 0.2 cm.  This was treated with chemical cauterization.  These wounds were dressed with Xeroform, and ABD pads.     Neurological:      Mental Status: He is alert.         Assessment:        P   ICD-10-CM ICD-9-CM   PL                   1.   Pressure injury of sacral region, stage 4   L89.154 707.03 ...  Change Dx      2.   Pressure injury " of right leg, stage 4   L89.894 707.09 ...  Change Dx      3.   Pressure injury of left leg, stage 4   L89.894 707.09 ...  Change Dx      4.   Pressure injury of right upper back, stage 4   L89.114 707.02 ...           1. Pressure injury of sacral region, stage 4    2. Pressure injury of right leg, stage 4    3. Pressure injury of left leg, stage 4    4. Pressure injury of right upper back, stage 4           Altered Skin Integrity 05/13/22 1232 Right lower Leg #1 Other (comment) Full thickness tissue loss. Subcutaneous fat may be visible but bone, tendon or muscle are not exposed (Active)   05/13/22 1232   Altered Skin Integrity Present on Admission: yes   Side: Right   Orientation: lower   Location: Leg   Wound Number: #1   Is this injury device related?: No   Primary Wound Type: Other   Description of Altered Skin Integrity: Full thickness tissue loss. Subcutaneous fat may be visible but bone, tendon or muscle are not exposed   Removal Indication and Assessment:    Wound Outcome:    (Retired) Wound Length (cm):    (Retired) Wound Width (cm):    (Retired) Depth (cm):    Wound Description (Comments):    Removal Indications:    Description of Altered Skin Integrity Full thickness tissue loss. Subcutaneous fat may be visible but bone, tendon or muscle are not exposed 06/03/22 1200   Dressing Appearance Moist drainage 06/03/22 1200   Drainage Amount Moderate 06/03/22 1200   Drainage Characteristics/Odor Serosanguineous 06/03/22 1200   Appearance Moist 06/03/22 1200   Tissue loss description Full thickness 06/03/22 1200   Periwound Area Moist 06/03/22 1200   Wound Edges Open 06/03/22 1200   Wound Length (cm) 2.5 cm 06/03/22 1200   Wound Width (cm) 2 cm 06/03/22 1200   Wound Depth (cm) 0.2 cm 06/03/22 1200   Wound Volume (cm^3) 1 cm^3 06/03/22 1200   Wound Surface Area (cm^2) 5 cm^2 06/03/22 1200   Care Cleansed with:;Wound cleanser 06/03/22 1200   Dressing Applied 06/03/22 1200   Dressing Change Due 06/04/22 06/03/22  1200            Altered Skin Integrity 05/13/22 1237 Back #2 Other (comment) Full thickness tissue loss with exposed bone, tendon, or muscle. Often includes undermining and tunneling. May extend into muscle and/or supporting structures. (Active)   05/13/22 1237   Altered Skin Integrity Present on Admission: yes   Side:    Orientation:    Location: Back   Wound Number: #2   Is this injury device related?: No   Primary Wound Type: Other   Description of Altered Skin Integrity: Full thickness tissue loss with exposed bone, tendon, or muscle. Often includes undermining and tunneling. May extend into muscle and/or supporting structures.   Removal Indication and Assessment:    Wound Outcome:    (Retired) Wound Length (cm):    (Retired) Wound Width (cm):    (Retired) Depth (cm):    Wound Description (Comments):    Removal Indications:    Description of Altered Skin Integrity Full thickness tissue loss with exposed bone, tendon, or muscle. Often includes undermining and tunneling. May extend into muscle and/or supporting structures. 06/03/22 1200   Dressing Appearance Moist drainage 06/03/22 1200   Drainage Amount Moderate 06/03/22 1200   Drainage Characteristics/Odor Serosanguineous 06/03/22 1200   Appearance Moist 06/03/22 1200   Tissue loss description Full thickness 06/03/22 1200   Periwound Area Moist 06/03/22 1200   Wound Edges Open 06/03/22 1200   Wound Length (cm) 19 cm 06/03/22 1200   Wound Width (cm) 14 cm 06/03/22 1200   Wound Depth (cm) 0.2 cm 06/03/22 1200   Wound Volume (cm^3) 53.2 cm^3 06/03/22 1200   Wound Surface Area (cm^2) 266 cm^2 06/03/22 1200   Care Cleansed with:;Wound cleanser 06/03/22 1200   Dressing Applied;Other (comment) 06/03/22 1200   Dressing Change Due 06/04/22 06/03/22 1200            Altered Skin Integrity 05/13/22 1241 Right upper;posterior Arm #3 Other (comment) Full thickness tissue loss. Subcutaneous fat may be visible but bone, tendon or muscle are not exposed (Active)   05/13/22 1241    Altered Skin Integrity Present on Admission: yes   Side: Right   Orientation: upper;posterior   Location: Arm   Wound Number: #3   Is this injury device related?: No   Primary Wound Type: Other   Description of Altered Skin Integrity: Full thickness tissue loss. Subcutaneous fat may be visible but bone, tendon or muscle are not exposed   Removal Indication and Assessment:    Wound Outcome:    (Retired) Wound Length (cm):    (Retired) Wound Width (cm):    (Retired) Depth (cm):    Wound Description (Comments):    Removal Indications:    Description of Altered Skin Integrity Full thickness tissue loss. Subcutaneous fat may be visible but bone, tendon or muscle are not exposed 06/03/22 1200   Dressing Appearance Moist drainage 06/03/22 1200   Drainage Amount Moderate 06/03/22 1200   Drainage Characteristics/Odor Serosanguineous 06/03/22 1200   Appearance Moist 06/03/22 1200   Tissue loss description Full thickness 06/03/22 1200   Periwound Area Moist 06/03/22 1200   Wound Edges Open 06/03/22 1200   Wound Length (cm) 1.5 cm 06/03/22 1200   Wound Width (cm) 1.5 cm 06/03/22 1200   Wound Depth (cm) 0.2 cm 06/03/22 1200   Wound Volume (cm^3) 0.45 cm^3 06/03/22 1200   Wound Surface Area (cm^2) 2.25 cm^2 06/03/22 1200   Care Cleansed with:;Wound cleanser 06/03/22 1200   Dressing Applied;Other (comment) 06/03/22 1200   Dressing Change Due 06/04/22 06/03/22 1200            Altered Skin Integrity 05/13/22 1247 Right posterior Thigh #4 Other (comment) Full thickness tissue loss. Subcutaneous fat may be visible but bone, tendon or muscle are not exposed (Active)   05/13/22 1247   Altered Skin Integrity Present on Admission: yes   Side: Right   Orientation: posterior   Location: Thigh   Wound Number: #4   Is this injury device related?: No   Primary Wound Type: Other   Description of Altered Skin Integrity: Full thickness tissue loss. Subcutaneous fat may be visible but bone, tendon or muscle are not exposed   Removal Indication  and Assessment:    Wound Outcome:    (Retired) Wound Length (cm):    (Retired) Wound Width (cm):    (Retired) Depth (cm):    Wound Description (Comments):    Removal Indications:    Description of Altered Skin Integrity Full thickness tissue loss. Subcutaneous fat may be visible but bone, tendon or muscle are not exposed 06/03/22 1200   Dressing Appearance Moist drainage 06/03/22 1200   Drainage Amount Moderate 06/03/22 1200   Drainage Characteristics/Odor Serosanguineous 06/03/22 1200   Appearance Moist 06/03/22 1200   Tissue loss description Full thickness 06/03/22 1200   Periwound Area Moist 06/03/22 1200   Wound Edges Open 06/03/22 1200   Wound Length (cm) 18 cm 06/03/22 1200   Wound Width (cm) 5 cm 06/03/22 1200   Wound Depth (cm) 0.2 cm 06/03/22 1200   Wound Volume (cm^3) 18 cm^3 06/03/22 1200   Wound Surface Area (cm^2) 90 cm^2 06/03/22 1200   Care Cleansed with:;Wound cleanser 06/03/22 1200   Dressing Applied;Other (comment) 06/03/22 1200            Altered Skin Integrity 05/13/22 0104 Left posterior Thigh #5 Other (comment) Partial thickness tissue loss. Shallow open ulcer with a red or pink wound bed, without slough. Intact or Open/Ruptured Serum-filled blister. (Active)   05/13/22 0104   Altered Skin Integrity Present on Admission: yes   Side: Left   Orientation: posterior   Location: Thigh   Wound Number: #5   Is this injury device related?: No   Primary Wound Type: Other   Description of Altered Skin Integrity: Partial thickness tissue loss. Shallow open ulcer with a red or pink wound bed, without slough. Intact or Open/Ruptured Serum-filled blister.   Removal Indication and Assessment:    Wound Outcome:    (Retired) Wound Length (cm):    (Retired) Wound Width (cm):    (Retired) Depth (cm):    Wound Description (Comments):    Removal Indications:    Description of Altered Skin Integrity Partial thickness tissue loss. Shallow open ulcer with a red or pink wound bed, without slough. Intact or  Open/Ruptured Serum-filled blister. 06/03/22 1200   Dressing Appearance Moist drainage 06/03/22 1200   Drainage Amount Moderate 06/03/22 1200   Drainage Characteristics/Odor Serosanguineous 06/03/22 1200   Appearance Moist 06/03/22 1200   Tissue loss description Full thickness 06/03/22 1200   Periwound Area Moist 06/03/22 1200   Wound Edges Open 06/03/22 1200   Wound Length (cm) 3 cm 06/03/22 1200   Wound Width (cm) 1.2 cm 06/03/22 1200   Wound Depth (cm) 0.2 cm 06/03/22 1200   Wound Volume (cm^3) 0.72 cm^3 06/03/22 1200   Wound Surface Area (cm^2) 3.6 cm^2 06/03/22 1200   Care Cleansed with:;Wound cleanser 06/03/22 1200   Dressing Applied;Other (comment) 06/03/22 1200   Dressing Change Due 06/04/22 06/03/22 1200            Altered Skin Integrity 05/13/22 0107 Sacral spine #6 Other (comment) Full thickness tissue loss with exposed bone, tendon, or muscle. Often includes undermining and tunneling. May extend into muscle and/or supporting structures. (Active)   05/13/22 0107   Altered Skin Integrity Present on Admission: yes   Side:    Orientation:    Location: Sacral spine   Wound Number: #6   Is this injury device related?: No   Primary Wound Type: Other   Description of Altered Skin Integrity: Full thickness tissue loss with exposed bone, tendon, or muscle. Often includes undermining and tunneling. May extend into muscle and/or supporting structures.   Removal Indication and Assessment:    Wound Outcome:    (Retired) Wound Length (cm):    (Retired) Wound Width (cm):    (Retired) Depth (cm):    Wound Description (Comments):    Removal Indications:    Description of Altered Skin Integrity Full thickness tissue loss with exposed bone, tendon, or muscle. Often includes undermining and tunneling. May extend into muscle and/or supporting structures. 06/03/22 1200   Dressing Appearance Moist drainage 06/03/22 1200   Drainage Amount Moderate 06/03/22 1200   Drainage Characteristics/Odor Serosanguineous 06/03/22 1200    Appearance Moist 06/03/22 1200   Tissue loss description Full thickness 06/03/22 1200   Periwound Area Moist 06/03/22 1200   Wound Edges Open 06/03/22 1200   Wound Length (cm) 3 cm 06/03/22 1200   Wound Width (cm) 1.5 cm 06/03/22 1200   Wound Depth (cm) 0.2 cm 06/03/22 1200   Wound Volume (cm^3) 0.9 cm^3 06/03/22 1200   Wound Surface Area (cm^2) 4.5 cm^2 06/03/22 1200   Care Cleansed with:;Wound cleanser 06/03/22 1200   Dressing Applied;Other (comment) 06/03/22 1200   Dressing Change Due 06/04/22 06/03/22 1200           Plan:       **Cleanse with saline and apply xeroform to all open wounds. Cover with ABD pad/gentle foam boarder - change daily and/or PRN **         Return in 3 weeks.  Hydrocodone 10 mg, number 63, prescribed with my prescription pad.

## 2022-06-24 ENCOUNTER — HOSPITAL ENCOUNTER (OUTPATIENT)
Dept: WOUND CARE | Facility: HOSPITAL | Age: 47
Discharge: HOME | End: 2022-06-24
Attending: FAMILY MEDICINE
Payer: MEDICARE

## 2022-06-24 VITALS
DIASTOLIC BLOOD PRESSURE: 68 MMHG | HEART RATE: 87 BPM | RESPIRATION RATE: 19 BRPM | HEIGHT: 73 IN | WEIGHT: 175 LBS | BODY MASS INDEX: 23.19 KG/M2 | SYSTOLIC BLOOD PRESSURE: 112 MMHG

## 2022-06-24 DIAGNOSIS — L89.114 PRESSURE INJURY OF RIGHT UPPER BACK, STAGE 4: ICD-10-CM

## 2022-06-24 DIAGNOSIS — L89.894 PRESSURE INJURY OF RIGHT LEG, STAGE 4: Primary | ICD-10-CM

## 2022-06-24 DIAGNOSIS — L89.894 PRESSURE INJURY OF LEFT LEG, STAGE 4: ICD-10-CM

## 2022-06-24 DIAGNOSIS — L89.154 PRESSURE INJURY OF SACRAL REGION, STAGE 4: ICD-10-CM

## 2022-06-24 PROCEDURE — 17250 CHEM CAUT OF GRANLTJ TISSUE: CPT

## 2022-06-24 PROCEDURE — 27000999 HC MEDICAL RECORD PHOTO DOCUMENTATION

## 2022-06-24 PROCEDURE — 99213 OFFICE O/P EST LOW 20 MIN: CPT

## 2022-06-24 RX ORDER — HYDROCODONE BITARTRATE AND ACETAMINOPHEN 10; 325 MG/1; MG/1
1 TABLET ORAL EVERY 8 HOURS PRN
Qty: 63 TABLET | Refills: 0 | Status: SHIPPED | OUTPATIENT
Start: 2022-06-24 | End: 2022-07-15 | Stop reason: SDUPTHER

## 2022-06-24 RX ORDER — HYDROCODONE BITARTRATE AND ACETAMINOPHEN 10; 325 MG/1; MG/1
TABLET ORAL
COMMUNITY
Start: 2022-06-03 | End: 2022-06-24 | Stop reason: SDUPTHER

## 2022-06-24 NOTE — PROGRESS NOTES
"   Subjective:       Patient ID: Modesto Payton is a 47 y.o. male.    Chief Complaint: Pressure Ulcer (#1) Right lower leg - stage 4/#2) Back - stage 4/#3) Right posterior arm - stage 4/#4) Right posterior thigh - stage 4/#5) Left posterior thigh - stage 2/#6) Sacrum - stage 4)    HPI   47-year-old  male, quadriplegia, who is here for regular followup of his numerous pressure ulcers.  There are no new complaints.  He may have a new pressure ulcer on an extremity.  His sister is his caretaker.  He comes in mainly for dressing changes and chemical cauterization of hypergranulation tissue.  Review of Systems   Constitutional: Negative.    Respiratory: Negative.    Cardiovascular: Negative.    Skin:        There are worse pressure ulcers on his back, sacrum, extremities.  These have not changed significantly.   All other systems reviewed and are negative.                                     Objective:       Vitals:    06/24/22 1124   BP: 112/68   Pulse: 87   Resp: 19   Weight: 79.4 kg (175 lb)   Height: 6' 1" (1.854 m)       Physical Exam  Constitutional:       Appearance: Normal appearance.   Pulmonary:      Effort: Pulmonary effort is normal.   Skin:     General: Skin is warm and dry.      Comments: Wound 1., right lower leg, mechanical debridement, hypergranulation treated with silver nitrate.  Wound 2., back, treated with mechanical debridement and hypergranulation treated with silver nitrate.  Wound 3., right posterior arm a, mechanical debridement, hypergranulation treated with silver nitrate.  4., right posterior thigh, treated with mechanical debridement and hypergranulation treated with silver nitrate.    Wound per 5, left posterior thigh, treated with mechanical debridement and hypergranulation treated with silver nitrate.  Wound 6., sacrum, mechanical debridement, hypergranulation treated with silver nitrate.     Neurological:      Mental Status: He is alert.            Altered Skin " Integrity 05/13/22 1232 Right lower Leg #1 Other (comment) Full thickness tissue loss. Subcutaneous fat may be visible but bone, tendon or muscle are not exposed (Active)   05/13/22 1232   Altered Skin Integrity Present on Admission: yes   Side: Right   Orientation: lower   Location: Leg   Wound Number: #1   Is this injury device related?: No   Primary Wound Type: Other   Description of Altered Skin Integrity: Full thickness tissue loss. Subcutaneous fat may be visible but bone, tendon or muscle are not exposed   Removal Indication and Assessment:    Wound Outcome:    (Retired) Wound Length (cm):    (Retired) Wound Width (cm):    (Retired) Depth (cm):    Wound Description (Comments):    Removal Indications:    Description of Altered Skin Integrity Full thickness tissue loss with exposed bone, tendon, or muscle. Often includes undermining and tunneling. May extend into muscle and/or supporting structures. 06/24/22 1200   Dressing Appearance Moist drainage 06/24/22 1200   Drainage Amount Moderate 06/24/22 1200   Appearance Intact;Hypergranulation 06/24/22 1200   Tissue loss description Full thickness 06/24/22 1200   Periwound Area Intact 06/24/22 1200   Wound Edges Open 06/24/22 1200   Wound Length (cm) 1.3 cm 06/24/22 1200   Wound Width (cm) 1 cm 06/24/22 1200   Wound Depth (cm) 0.2 cm 06/24/22 1200   Wound Volume (cm^3) 0.26 cm^3 06/24/22 1200   Wound Surface Area (cm^2) 1.3 cm^2 06/24/22 1200   Care Cleansed with:;Wound cleanser 06/24/22 1200   Dressing Applied;Other (comment) 06/24/22 1200   Dressing Change Due 06/25/22 06/24/22 1200            Altered Skin Integrity 05/13/22 1237 Back #2 Other (comment) Full thickness tissue loss with exposed bone, tendon, or muscle. Often includes undermining and tunneling. May extend into muscle and/or supporting structures. (Active)   05/13/22 1237   Altered Skin Integrity Present on Admission: yes   Side:    Orientation:    Location: Back   Wound Number: #2   Is this injury  device related?: No   Primary Wound Type: Other   Description of Altered Skin Integrity: Full thickness tissue loss with exposed bone, tendon, or muscle. Often includes undermining and tunneling. May extend into muscle and/or supporting structures.   Removal Indication and Assessment:    Wound Outcome:    (Retired) Wound Length (cm):    (Retired) Wound Width (cm):    (Retired) Depth (cm):    Wound Description (Comments):    Removal Indications:    Description of Altered Skin Integrity Full thickness tissue loss with exposed bone, tendon, or muscle. Often includes undermining and tunneling. May extend into muscle and/or supporting structures. 06/24/22 1200   Dressing Appearance Moist drainage 06/24/22 1200   Drainage Amount Moderate 06/24/22 1200   Appearance Hypergranulation 06/24/22 1200   Tissue loss description Full thickness 06/24/22 1200   Periwound Area Intact 06/24/22 1200   Wound Edges Open 06/24/22 1200   Wound Length (cm) 17 cm 06/24/22 1200   Wound Width (cm) 2.5 cm 06/24/22 1200   Wound Depth (cm) 0.2 cm 06/24/22 1200   Wound Volume (cm^3) 8.5 cm^3 06/24/22 1200   Wound Surface Area (cm^2) 42.5 cm^2 06/24/22 1200   Care Cleansed with:;Wound cleanser 06/24/22 1200   Dressing Applied;Other (comment) 06/24/22 1200   Dressing Change Due 06/25/22 06/24/22 1200            Altered Skin Integrity 05/13/22 1241 Right upper;posterior Arm #3 Other (comment) Full thickness tissue loss. Subcutaneous fat may be visible but bone, tendon or muscle are not exposed (Active)   05/13/22 1241   Altered Skin Integrity Present on Admission: yes   Side: Right   Orientation: upper;posterior   Location: Arm   Wound Number: #3   Is this injury device related?: No   Primary Wound Type: Other   Description of Altered Skin Integrity: Full thickness tissue loss. Subcutaneous fat may be visible but bone, tendon or muscle are not exposed   Removal Indication and Assessment:    Wound Outcome:    (Retired) Wound Length (cm):    (Retired)  Wound Width (cm):    (Retired) Depth (cm):    Wound Description (Comments):    Removal Indications:    Description of Altered Skin Integrity Full thickness tissue loss with exposed bone, tendon, or muscle. Often includes undermining and tunneling. May extend into muscle and/or supporting structures. 06/24/22 1200   Dressing Appearance Moist drainage 06/24/22 1200   Drainage Amount Moderate 06/24/22 1200   Appearance Intact 06/24/22 1200   Tissue loss description Full thickness 06/24/22 1200   Periwound Area Intact 06/24/22 1200   Wound Length (cm) 1.5 cm 06/24/22 1200   Wound Width (cm) 1.5 cm 06/24/22 1200   Wound Depth (cm) 0.2 cm 06/24/22 1200   Wound Volume (cm^3) 0.45 cm^3 06/24/22 1200   Wound Surface Area (cm^2) 2.25 cm^2 06/24/22 1200   Care Cleansed with:;Wound cleanser 06/24/22 1200   Dressing Applied;Other (comment) 06/24/22 1200   Dressing Change Due 06/25/22 06/24/22 1200            Altered Skin Integrity 05/13/22 1247 Right posterior Thigh #4 Other (comment) Full thickness tissue loss. Subcutaneous fat may be visible but bone, tendon or muscle are not exposed (Active)   05/13/22 1247   Altered Skin Integrity Present on Admission: yes   Side: Right   Orientation: posterior   Location: Thigh   Wound Number: #4   Is this injury device related?: No   Primary Wound Type: Other   Description of Altered Skin Integrity: Full thickness tissue loss. Subcutaneous fat may be visible but bone, tendon or muscle are not exposed   Removal Indication and Assessment:    Wound Outcome:    (Retired) Wound Length (cm):    (Retired) Wound Width (cm):    (Retired) Depth (cm):    Wound Description (Comments):    Removal Indications:    Description of Altered Skin Integrity Full thickness tissue loss with exposed bone, tendon, or muscle. Often includes undermining and tunneling. May extend into muscle and/or supporting structures. 06/24/22 1200   Dressing Appearance Moist drainage 06/24/22 1200   Drainage Amount Moderate  06/24/22 1200   Drainage Characteristics/Odor Serosanguineous 06/24/22 1200   Appearance Intact;Hypergranulation 06/24/22 1200   Tissue loss description Full thickness 06/24/22 1200   Periwound Area Intact 06/24/22 1200   Wound Edges Open 06/24/22 1200   Wound Length (cm) 30 cm 06/24/22 1200   Wound Width (cm) 11 cm 06/24/22 1200   Wound Depth (cm) 0.2 cm 06/24/22 1200   Wound Volume (cm^3) 66 cm^3 06/24/22 1200   Wound Surface Area (cm^2) 330 cm^2 06/24/22 1200   Care Cleansed with:;Wound cleanser 06/24/22 1200   Dressing Applied;Other (comment) 06/24/22 1200   Dressing Change Due 06/25/22 06/24/22 1200            Altered Skin Integrity 05/13/22 0104 Left posterior Thigh #5 Other (comment) Partial thickness tissue loss. Shallow open ulcer with a red or pink wound bed, without slough. Intact or Open/Ruptured Serum-filled blister. (Active)   05/13/22 0104   Altered Skin Integrity Present on Admission: yes   Side: Left   Orientation: posterior   Location: Thigh   Wound Number: #5   Is this injury device related?: No   Primary Wound Type: Other   Description of Altered Skin Integrity: Partial thickness tissue loss. Shallow open ulcer with a red or pink wound bed, without slough. Intact or Open/Ruptured Serum-filled blister.   Removal Indication and Assessment:    Wound Outcome:    (Retired) Wound Length (cm):    (Retired) Wound Width (cm):    (Retired) Depth (cm):    Wound Description (Comments):    Removal Indications:    Description of Altered Skin Integrity Partial thickness tissue loss. Shallow open ulcer with a red or pink wound bed, without slough. Intact or Open/Ruptured Serum-filled blister. 06/24/22 1200   Dressing Appearance Moist drainage 06/24/22 1200   Drainage Amount Moderate 06/24/22 1200   Drainage Characteristics/Odor Serosanguineous 06/24/22 1200   Appearance Hypergranulation 06/24/22 1200   Tissue loss description Full thickness 06/24/22 1200   Periwound Area Intact 06/24/22 1200   Wound Edges Open  06/24/22 1200   Wound Length (cm) 0.5 cm 06/24/22 1200   Wound Width (cm) 2.5 cm 06/24/22 1200   Wound Depth (cm) 0.2 cm 06/24/22 1200   Wound Volume (cm^3) 0.25 cm^3 06/24/22 1200   Wound Surface Area (cm^2) 1.25 cm^2 06/24/22 1200   Care Cleansed with:;Wound cleanser 06/24/22 1200   Dressing Applied;Other (comment) 06/24/22 1200   Dressing Change Due 06/25/22 06/24/22 1200            Altered Skin Integrity 05/13/22 0107 Sacral spine #6 Other (comment) Full thickness tissue loss with exposed bone, tendon, or muscle. Often includes undermining and tunneling. May extend into muscle and/or supporting structures. (Active)   05/13/22 0107   Altered Skin Integrity Present on Admission: yes   Side:    Orientation:    Location: Sacral spine   Wound Number: #6   Is this injury device related?: No   Primary Wound Type: Other   Description of Altered Skin Integrity: Full thickness tissue loss with exposed bone, tendon, or muscle. Often includes undermining and tunneling. May extend into muscle and/or supporting structures.   Removal Indication and Assessment:    Wound Outcome:    (Retired) Wound Length (cm):    (Retired) Wound Width (cm):    (Retired) Depth (cm):    Wound Description (Comments):    Removal Indications:    Description of Altered Skin Integrity Full thickness tissue loss with exposed bone, tendon, or muscle. Often includes undermining and tunneling. May extend into muscle and/or supporting structures. 06/24/22 1200   Dressing Appearance Moist drainage 06/24/22 1200   Drainage Amount Moderate 06/24/22 1200   Drainage Characteristics/Odor Serosanguineous 06/24/22 1200   Appearance Intact;Hypergranulation 06/24/22 1200   Tissue loss description Full thickness 06/24/22 1200   Periwound Area Intact 06/24/22 1200   Wound Edges Open 06/24/22 1200   Wound Length (cm) 8.5 cm 06/24/22 1200   Wound Width (cm) 5.5 cm 06/24/22 1200   Wound Depth (cm) 0.2 cm 06/24/22 1200   Wound Volume (cm^3) 9.35 cm^3 06/24/22 1200   Wound  Surface Area (cm^2) 46.75 cm^2 06/24/22 1200   Care Cleansed with:;Wound cleanser 06/24/22 1200   Dressing Applied;Other (comment) 06/24/22 1200   Dressing Change Due 06/25/22 06/24/22 1200            Wound 06/24/22 1219 Other (comment) Left lateral;lower Leg #7 (Active)   06/24/22 1219    Pre-existing: Yes   Primary Wound Type: Other   Side: Left   Orientation: lateral;lower   Location: Leg   Wound Number: #7   Ankle-Brachial Index:    Pulses:    Removal Indication and Assessment:    Wound Outcome:    (Retired) Wound Type:    (Retired) Wound Length (cm):    (Retired) Wound Width (cm):    (Retired) Depth (cm):    Wound Description (Comments):    Removal Indications:    Dressing Appearance Moist drainage 06/24/22 1200   Drainage Amount Moderate 06/24/22 1200   Drainage Characteristics/Odor Serosanguineous 06/24/22 1200   Appearance Moist 06/24/22 1200   Tissue loss description Full thickness 06/24/22 1200   Periwound Area Intact 06/24/22 1200   Wound Edges Open 06/24/22 1200   Wound Length (cm) 3.6 cm 06/24/22 1200   Wound Width (cm) 4 cm 06/24/22 1200   Wound Depth (cm) 0.2 cm 06/24/22 1200   Wound Volume (cm^3) 2.88 cm^3 06/24/22 1200   Wound Surface Area (cm^2) 14.4 cm^2 06/24/22 1200   Care Cleansed with:;Wound cleanser 06/24/22 1200   Dressing Applied;Other (comment) 06/24/22 1200   Dressing Change Due 06/25/22 06/24/22 1200         Assessment:         ICD-10-CM ICD-9-CM   1. Pressure injury of right leg, stage 4  L89.894 707.09     707.24   2. Pressure injury of sacral region, stage 4  L89.154 707.03     707.24   3. Pressure injury of left leg, stage 4  L89.894 707.09     707.24   4. Pressure injury of right upper back, stage 4  L89.114 707.02     707.24         Procedures:   Excisional debridement performed:  [] Yes [x] No   Selective debridement performed:  [] Yes [x] No   Mechanical debridement performed:  [x] Yes [] No   Silver nitrate applied :    [x] Yes [] No   Labs ordered this visit:   [] Yes [x] No    Imaging ordered this visit:   [] Yes [x] No   Tissue pathology and/or culture taken:  [] Yes [x] No     Procedures:  [facility]:  TIMES PER WEEK/DAYS:  ORDERS:  Apply xeroform, ABD pads,and clean dry dressings to be changed daily to all wounds  Patient Orders:                 Follow up in about 3 weeks (around 7/15/2022).

## 2022-06-27 ENCOUNTER — TELEPHONE (OUTPATIENT)
Dept: FAMILY MEDICINE | Facility: CLINIC | Age: 47
End: 2022-06-27
Payer: MEDICARE

## 2022-06-27 NOTE — TELEPHONE ENCOUNTER
Patient sister stating patient is having muscle spams and that he says are lasting for hours he was seen 3/15 for his wellness ans has not done wellness labs yet since or before appointment but agrees to go tomorrow sister wants to know should she still take him and is there anything else you would advise      ----- Message from Elizabeth Fernandez sent at 6/27/2022 10:36 AM CDT -----  Regarding: Advice  Type:  Needs Medical Advice    Who Called: Patient sister   Symptoms (please be specific): muscle spasms, getting worse   How long has patient had these symptoms:    Pharmacy name and phone #:     Would the patient rather a call back or a response via [facility]?   Best Call Back Number: [phone]  Additional Information:  She is hoping a lab can be ordered to assist him, taking him tomorrow morning for his other labs as well

## 2022-06-28 ENCOUNTER — LAB VISIT (OUTPATIENT)
Dept: LAB | Facility: HOSPITAL | Age: 47
End: 2022-06-28
Attending: FAMILY MEDICINE
Payer: MEDICARE

## 2022-06-28 ENCOUNTER — TELEPHONE (OUTPATIENT)
Dept: FAMILY MEDICINE | Facility: CLINIC | Age: 47
End: 2022-06-28
Payer: MEDICARE

## 2022-06-28 DIAGNOSIS — E78.00 HYPERCHOLESTEREMIA: ICD-10-CM

## 2022-06-28 DIAGNOSIS — Z00.00 WELLNESS EXAMINATION: Primary | ICD-10-CM

## 2022-06-28 DIAGNOSIS — G82.50 QUADRIPLEGIA: ICD-10-CM

## 2022-06-28 DIAGNOSIS — Z12.5 SCREENING PSA (PROSTATE SPECIFIC ANTIGEN): ICD-10-CM

## 2022-06-28 DIAGNOSIS — Z00.00 WELLNESS EXAMINATION: ICD-10-CM

## 2022-06-28 DIAGNOSIS — R73.01 IMPAIRED FASTING GLUCOSE: ICD-10-CM

## 2022-06-28 LAB
ALBUMIN SERPL-MCNC: 3.6 GM/DL (ref 3.5–5)
ALBUMIN/GLOB SERPL: 0.9 RATIO (ref 1.1–2)
ALP SERPL-CCNC: 104 UNIT/L (ref 40–150)
ALT SERPL-CCNC: 12 UNIT/L (ref 0–55)
AST SERPL-CCNC: 13 UNIT/L (ref 5–34)
BASOPHILS # BLD AUTO: 0.04 X10(3)/MCL (ref 0–0.2)
BASOPHILS NFR BLD AUTO: 0.7 %
BILIRUBIN DIRECT+TOT PNL SERPL-MCNC: 0.2 MG/DL
BUN SERPL-MCNC: 15.1 MG/DL (ref 8.9–20.6)
CALCIUM SERPL-MCNC: 9.5 MG/DL (ref 8.4–10.2)
CHLORIDE SERPL-SCNC: 107 MMOL/L (ref 98–107)
CHOLEST SERPL-MCNC: 148 MG/DL
CHOLEST/HDLC SERPL: 3 {RATIO} (ref 0–5)
CO2 SERPL-SCNC: 23 MMOL/L (ref 22–29)
CREAT SERPL-MCNC: 0.5 MG/DL (ref 0.73–1.18)
ELLIPTOCYTOSIS (OHS): ABNORMAL
EOSINOPHIL # BLD AUTO: 0.09 X10(3)/MCL (ref 0–0.9)
EOSINOPHIL NFR BLD AUTO: 1.5 %
ERYTHROCYTE [DISTWIDTH] IN BLOOD BY AUTOMATED COUNT: 20.2 % (ref 11.5–17)
GLOBULIN SER-MCNC: 4 GM/DL (ref 2.4–3.5)
GLUCOSE SERPL-MCNC: 122 MG/DL (ref 74–100)
HCT VFR BLD AUTO: 41.9 % (ref 42–52)
HDLC SERPL-MCNC: 44 MG/DL (ref 35–60)
HGB BLD-MCNC: 12.6 GM/DL (ref 14–18)
IMM GRANULOCYTES # BLD AUTO: 0.01 X10(3)/MCL (ref 0–0.02)
IMM GRANULOCYTES NFR BLD AUTO: 0.2 % (ref 0–0.43)
LDLC SERPL CALC-MCNC: 92 MG/DL (ref 50–140)
LYMPHOCYTES # BLD AUTO: 1.86 X10(3)/MCL (ref 0.6–4.6)
LYMPHOCYTES NFR BLD AUTO: 30.3 %
MCH RBC QN AUTO: 20.1 PG (ref 27–31)
MCHC RBC AUTO-ENTMCNC: 30.1 MG/DL (ref 33–36)
MCV RBC AUTO: 66.8 FL (ref 80–94)
MONOCYTES # BLD AUTO: 0.38 X10(3)/MCL (ref 0.1–1.3)
MONOCYTES NFR BLD AUTO: 6.2 %
NEUTROPHILS # BLD AUTO: 3.8 X10(3)/MCL (ref 2.1–9.2)
NEUTROPHILS NFR BLD AUTO: 61.1 %
NRBC BLD AUTO-RTO: 0 %
OVALOCYTES (OLG): ABNORMAL
PLATELET # BLD AUTO: 333 X10(3)/MCL (ref 130–400)
PLATELET # BLD EST: ADEQUATE 10*3/UL
PMV BLD AUTO: 10.8 FL (ref 9.4–12.4)
POIKILOCYTOSIS BLD QL SMEAR: ABNORMAL
POTASSIUM SERPL-SCNC: 4.9 MMOL/L (ref 3.5–5.1)
PROT SERPL-MCNC: 7.6 GM/DL (ref 6.4–8.3)
RBC # BLD AUTO: 6.27 X10(6)/MCL (ref 4.7–6.1)
RBC MORPH BLD: ABNORMAL
SODIUM SERPL-SCNC: 138 MMOL/L (ref 136–145)
TRIGL SERPL-MCNC: 60 MG/DL (ref 34–140)
TSH SERPL-ACNC: 1.72 UIU/ML (ref 0.35–4.94)
VLDLC SERPL CALC-MCNC: 12 MG/DL
WBC # SPEC AUTO: 6.1 X10(3)/MCL (ref 4.5–11.5)

## 2022-06-28 PROCEDURE — 84443 ASSAY THYROID STIM HORMONE: CPT

## 2022-06-28 PROCEDURE — 36415 COLL VENOUS BLD VENIPUNCTURE: CPT

## 2022-06-28 PROCEDURE — 85025 COMPLETE CBC W/AUTO DIFF WBC: CPT

## 2022-06-28 PROCEDURE — 80061 LIPID PANEL: CPT

## 2022-06-28 PROCEDURE — 80053 COMPREHEN METABOLIC PANEL: CPT

## 2022-06-29 DIAGNOSIS — R73.09 ELEVATED GLUCOSE: Primary | ICD-10-CM

## 2022-07-15 ENCOUNTER — HOSPITAL ENCOUNTER (OUTPATIENT)
Dept: WOUND CARE | Facility: HOSPITAL | Age: 47
Discharge: HOME | End: 2022-07-15
Attending: FAMILY MEDICINE
Payer: MEDICARE

## 2022-07-15 ENCOUNTER — CLINICAL SUPPORT (OUTPATIENT)
Dept: FAMILY MEDICINE | Facility: CLINIC | Age: 47
End: 2022-07-15
Payer: MEDICARE

## 2022-07-15 VITALS — WEIGHT: 175 LBS | BODY MASS INDEX: 23.19 KG/M2 | HEIGHT: 73 IN

## 2022-07-15 VITALS
HEART RATE: 72 BPM | RESPIRATION RATE: 18 BRPM | SYSTOLIC BLOOD PRESSURE: 114 MMHG | HEIGHT: 73 IN | BODY MASS INDEX: 23.19 KG/M2 | DIASTOLIC BLOOD PRESSURE: 70 MMHG | WEIGHT: 175 LBS

## 2022-07-15 DIAGNOSIS — L89.154 PRESSURE INJURY OF SACRAL REGION, STAGE 4: ICD-10-CM

## 2022-07-15 DIAGNOSIS — R10.84 GENERALIZED ABDOMINAL PAIN: Primary | ICD-10-CM

## 2022-07-15 DIAGNOSIS — L89.894 PRESSURE INJURY OF LEFT LEG, STAGE 4: ICD-10-CM

## 2022-07-15 DIAGNOSIS — L89.114 PRESSURE INJURY OF RIGHT UPPER BACK, STAGE 4: ICD-10-CM

## 2022-07-15 DIAGNOSIS — L89.894 PRESSURE INJURY OF RIGHT LEG, STAGE 4: Primary | ICD-10-CM

## 2022-07-15 DIAGNOSIS — K59.00 CONSTIPATION, UNSPECIFIED CONSTIPATION TYPE: ICD-10-CM

## 2022-07-15 PROCEDURE — 99442 PR PHYSICIAN TELEPHONE EVALUATION 11-20 MIN: ICD-10-PCS | Mod: 95,,, | Performed by: FAMILY MEDICINE

## 2022-07-15 PROCEDURE — 17250 CHEM CAUT OF GRANLTJ TISSUE: CPT

## 2022-07-15 PROCEDURE — 97597 DBRDMT OPN WND 1ST 20 CM/<: CPT

## 2022-07-15 PROCEDURE — 27000999 HC MEDICAL RECORD PHOTO DOCUMENTATION

## 2022-07-15 PROCEDURE — 99214 OFFICE O/P EST MOD 30 MIN: CPT | Mod: 25

## 2022-07-15 PROCEDURE — 99442 PR PHYSICIAN TELEPHONE EVALUATION 11-20 MIN: CPT | Mod: 95,,, | Performed by: FAMILY MEDICINE

## 2022-07-15 RX ORDER — HYDROCODONE BITARTRATE AND ACETAMINOPHEN 10; 325 MG/1; MG/1
1 TABLET ORAL EVERY 8 HOURS PRN
Qty: 63 TABLET | Refills: 0 | Status: SHIPPED | OUTPATIENT
Start: 2022-07-15 | End: 2022-08-05 | Stop reason: SDUPTHER

## 2022-07-15 RX ORDER — ASPIRIN 81 MG
100 TABLET, DELAYED RELEASE (ENTERIC COATED) ORAL 2 TIMES DAILY PRN
Qty: 60 TABLET | Refills: 0 | Status: SHIPPED | OUTPATIENT
Start: 2022-07-15 | End: 2022-08-14

## 2022-07-15 NOTE — PROGRESS NOTES
Patient ID: [id number]     Chief Complaint: Spasms        HPI:   Disclaimer:  This note is prepared using voice recognition software and as such is likely to have errors despite attempts at proofreading. Please contact me for questions.     This is a telemedicine note. Patient was treated using telemedicine, real time audio only, according to [facility] protocols. Wanda AGUILERA MD, conducted the visit from the [facility]. The patient participated in the visit at a non-[facility] location selected by the patient, identified below. I am licensed in the state where the patient stated they are located. The patient stated that they understood and accepted the privacy and security risks to their information at their location. This visit is not recorded. Patient was located at the patient's home.     Modesto Payton is a 47 y.o. male here today for a telemedicine visit.    The patient's sister is present with him and currently holding the phone as patient has a history of quadriplegia.  He states that he is unable to have the video pointed toward his face as he is currently being bathed. The patient states that he has been having lower abdominal spasms when attempting to defecate.  He has a history of chronic constipation as well as a diverting colostomy.  The patient appears to have previously been prescribed docusate sodium however he states he is not taking the medication.  He states that he has been using over-the-counter milk of magnesia twice daily which has caused intermittent constipation and diarrhea.  He denies any blood in his stool and states that his last bowel movement was yesterday.  The patient denies any nausea, vomiting, fever or dizziness.  He states the abdominal pain radiates from the epigastric region to the lower quadrants but denies any radiation to the flanks or back.    Past Medical History:  Anemia  Chronic constipation  Kidney disease  Osteomyelitis  Quadriplegia  Renal disease      Past Surgical History:   Procedure Laterality Date    FLAP PROCEDURE      for PU     multiple surgicla debridements      NECK SURGERY      spinal fusion        Review of patient's allergies indicates:   Allergen Reactions    Iodine     Penicillins     Sulfur        Outpatient Medications Marked as Taking for the 7/15/22 encounter (Clinical Support) with PROVIDER, [facility]   Medication Sig Dispense Refill    baclofen (LIORESAL) 10 MG tablet       HYDROcodone-acetaminophen (NORCO)  mg per tablet Take 1 tablet by mouth every 8 (eight) hours as needed for Pain. 63 tablet 0    minocycline (MINOCIN,DYNACIN) 100 MG capsule       promethazine (PHENERGAN) 6.25 mg/5 mL syrup       traZODone (DESYREL) 50 MG tablet       zolpidem (AMBIEN) 10 mg Tab       [DISCONTINUED] docusate sodium (STOOL SOFTENER ORAL) Take 1 tablet by mouth once daily.         Social History     Socioeconomic History    Marital status: Unknown   Tobacco Use    Smoking status: Never Smoker    Smokeless tobacco: Never Used   Substance and Sexual Activity    Alcohol use: Never    Drug use: Not Currently        Family History   Family history unknown: Yes        Subjective:       See HPI for details  Review of Systems   Constitutional: Negative for fever, malaise/fatigue and weight loss.   Respiratory: Negative for shortness of breath.    Cardiovascular: Negative for chest pain.   Gastrointestinal: Positive for constipation. Negative for diarrhea, nausea and vomiting.   Genitourinary: Negative for dysuria, frequency and hematuria.   Musculoskeletal: Positive for myalgias.   Skin: Negative for rash.   Neurological: Negative for dizziness and headaches.   Answers for HPI/ROS submitted by the patient on 7/15/2022  Chronicity: new  Onset: in the past 7 days  Onset quality: sudden  Frequency: constantly  Progression since onset: waxing and waning  Pain location: LLQ  Pain - numeric: 10/10  Pain quality: aching  anorexia:  "Yes  arthralgias: No  belching: Yes  flatus: Yes  hematochezia: No  Aggravated by: belching, bowel movement  Relieved by: bowel movements  Pain severity: severe  Treatments tried: nothing  Improvement on treatment: no relief  abdominal surgery: No  colon cancer: No  Crohn's disease: No  gallstones: No  GERD: No  irritable bowel syndrome: No  kidney stones: No  pancreatitis: No  PUD: No  ulcerative colitis: No  UTI: No    All Other ROS: Negative except as stated in HPI.       Objective:     Ht 6' 1" (1.854 m)   Wt 79.4 kg (175 lb)   BMI 23.09 kg/m²     Physical Exam    Physical Exam: LIMITED DUE TO TELEMEDICINE RESTRICTIONS.  General: Alert and oriented, No acute distress.  Neck/Thyroid:  Full range of motion.  Respiratory: Non-labored respirations, Symmetrical chest wall expansion.  Psychiatric: Normal interaction, Coherent speech, Euthymic mood, Appropriate affect     Assessment:       ICD-10-CM ICD-9-CM   1. Generalized abdominal pain  R10.84 789.07   2. Constipation  K59.00 564.00        Plan:     Problem List Items Addressed This Visit    None     Visit Diagnoses     Generalized abdominal pain    -  Primary    Relevant Orders    X-Ray Abdomen Flat And Erect    Constipation        Relevant Orders    X-Ray Abdomen Flat And Erect       1. Generalized abdominal pain  - X-Ray Abdomen Flat And Erect; Future  Increase fiber in diet, may consider fiber supplement.  XR abdomen flat and erect ordered and pending. Patient will be notified of results.  Increase water intake.  Stop Milk of Mag and may take Colace 100 mg BID PRN.  Limit dairy or other other foods that increase constipation.  May consider simethicone as needed.  Monitor for worsening symptoms and contact clinic if any concerns arise.  ED precautions for severe abdominal pain, vomiting, bloody stools or any other worsening symptoms.  Patient denied blood in stools.    2. Constipation  - X-Ray Abdomen Flat And Erect; Future  - docusate sodium 100 mg capsule; " Take 100 mg by mouth 2 (two) times daily as needed for Constipation.  Dispense: 60 tablet; Refill: 0  See #1      Follow up in about 4 weeks (around 8/12/2022) for constipation, abdominal pain.      Telehealth Time Documentation:  Spent 15 minutes with patient on phone discussing health concerns and completing EHR. More than 50% of this time was spent in counseling and coordination of care.

## 2022-07-15 NOTE — PROGRESS NOTES
"   Subjective:       Patient ID: Modesto Payton is a 47 y.o. male.    Chief Complaint: Pressure Ulcer ((#1) Right lower leg - stage 4/#2) Back - stage 4/#3) Right posterior arm - stage 4/#4) Right posterior thigh - stage 4/#5) Left posterior thigh - stage 2/#6) Sacrum - stage 4)    47-year-old  male, quadriplegia, who is here for his 3 week visit, for chemical cauterization of his multiple pressure injuries, mostly posteriorly.  The wounds have not changed significantly since his last visit.  He has no new complaints.    Review of Systems   Constitutional: Negative.    Respiratory: Negative.    Cardiovascular: Negative.    Skin:        As documented in the HPI.   All other systems reviewed and are negative.        Objective:       Vitals:    07/15/22 1153   BP: 114/70   Pulse: 72   Resp: 18   Weight: 79.4 kg (175 lb)   Height: 6' 1" (1.854 m)       Physical Exam  Vitals and nursing note reviewed.   Constitutional:       Appearance: Normal appearance.   Cardiovascular:      Rate and Rhythm: Normal rate.   Pulmonary:      Effort: Pulmonary effort is normal.   Skin:     General: Skin is warm.      Comments: As multiple pressure injuries will his back, arms, legs.  I did chemical cauterization done most of these.  There was a small area of devitalized tissue and slough was small portion of his back wound, which I debrided with a curette.  There was no bleeding.   Neurological:      Mental Status: He is alert.        Sacrum    Right posterior thigh    Right posterior arm    Back    Right lower leg         Altered Skin Integrity 05/13/22 1232 Right lower Leg #1 Other (comment) Full thickness tissue loss. Subcutaneous fat may be visible but bone, tendon or muscle are not exposed (Active)   05/13/22 1232   Altered Skin Integrity Present on Admission: yes   Side: Right   Orientation: lower   Location: Leg   Wound Number: #1   Is this injury device related?: No   Primary Wound Type: Other   Description of " Altered Skin Integrity: Full thickness tissue loss. Subcutaneous fat may be visible but bone, tendon or muscle are not exposed   Removal Indication and Assessment:    Wound Outcome:    (Retired) Wound Length (cm):    (Retired) Wound Width (cm):    (Retired) Depth (cm):    Wound Description (Comments):    Removal Indications:    Dressing Appearance Moist drainage 07/15/22 1210   Drainage Amount Moderate 07/15/22 1210   Drainage Characteristics/Odor Serosanguineous 07/15/22 1210   Appearance Slough;Hypergranulation 07/15/22 1210   Tissue loss description Full thickness 07/15/22 1210   Periwound Area Intact;Moist 07/15/22 1210   Wound Edges Defined 07/15/22 1210   Wound Length (cm) 2.2 cm 07/15/22 1210   Wound Width (cm) 2 cm 07/15/22 1210   Wound Depth (cm) 0.2 cm 07/15/22 1210   Wound Volume (cm^3) 0.88 cm^3 07/15/22 1210   Wound Surface Area (cm^2) 4.4 cm^2 07/15/22 1210   Care Cleansed with:;Wound cleanser 07/15/22 1210   Dressing Applied;Other (comment) 07/15/22 1210   Dressing Change Due 07/16/22 07/15/22 1210            Altered Skin Integrity 05/13/22 1237 Back #2 Other (comment) Full thickness tissue loss with exposed bone, tendon, or muscle. Often includes undermining and tunneling. May extend into muscle and/or supporting structures. (Active)   05/13/22 1237   Altered Skin Integrity Present on Admission: yes   Side:    Orientation:    Location: Back   Wound Number: #2   Is this injury device related?: No   Primary Wound Type: Other   Description of Altered Skin Integrity: Full thickness tissue loss with exposed bone, tendon, or muscle. Often includes undermining and tunneling. May extend into muscle and/or supporting structures.   Removal Indication and Assessment:    Wound Outcome:    (Retired) Wound Length (cm):    (Retired) Wound Width (cm):    (Retired) Depth (cm):    Wound Description (Comments):    Removal Indications:    Dressing Appearance Moist drainage 07/15/22 1210   Drainage Amount Moderate  07/15/22 1210   Drainage Characteristics/Odor Serosanguineous 07/15/22 1210   Appearance Hypergranulation;Slough;Moist 07/15/22 1210   Tissue loss description Full thickness 07/15/22 1210   Periwound Area Intact;Moist 07/15/22 1210   Wound Edges Defined 07/15/22 1210   Wound Length (cm) 19 cm 07/15/22 1210   Wound Width (cm) 33 cm 07/15/22 1210   Wound Depth (cm) 0.2 cm 07/15/22 1210   Wound Volume (cm^3) 125.4 cm^3 07/15/22 1210   Wound Surface Area (cm^2) 627 cm^2 07/15/22 1210   Care Cleansed with:;Wound cleanser 07/15/22 1210   Dressing Applied;Other (comment) 07/15/22 1210   Dressing Change Due 07/16/22 07/15/22 1210            Altered Skin Integrity 05/13/22 1241 Right upper;posterior Arm #3 Other (comment) Full thickness tissue loss. Subcutaneous fat may be visible but bone, tendon or muscle are not exposed (Active)   05/13/22 1241   Altered Skin Integrity Present on Admission: yes   Side: Right   Orientation: upper;posterior   Location: Arm   Wound Number: #3   Is this injury device related?: No   Primary Wound Type: Other   Description of Altered Skin Integrity: Full thickness tissue loss. Subcutaneous fat may be visible but bone, tendon or muscle are not exposed   Removal Indication and Assessment:    Wound Outcome:    (Retired) Wound Length (cm):    (Retired) Wound Width (cm):    (Retired) Depth (cm):    Wound Description (Comments):    Removal Indications:    Dressing Appearance Moist drainage 07/15/22 1210   Drainage Amount Moderate 07/15/22 1210   Drainage Characteristics/Odor Serosanguineous 07/15/22 1210   Appearance Slough;Hypergranulation;Moist 07/15/22 1210   Tissue loss description Full thickness 07/15/22 1210   Periwound Area Intact;Moist 07/15/22 1210   Wound Edges Defined 07/15/22 1210   Wound Length (cm) 2.5 cm 07/15/22 1210   Wound Width (cm) 2 cm 07/15/22 1210   Wound Depth (cm) 0.2 cm 07/15/22 1210   Wound Volume (cm^3) 1 cm^3 07/15/22 1210   Wound Surface Area (cm^2) 5 cm^2 07/15/22 1210    Care Cleansed with:;Wound cleanser 07/15/22 1210   Dressing Applied;Other (comment) 07/15/22 1210   Dressing Change Due 07/16/22 07/15/22 1210            Altered Skin Integrity 05/13/22 1247 Right posterior Thigh #4 Other (comment) Full thickness tissue loss. Subcutaneous fat may be visible but bone, tendon or muscle are not exposed (Active)   05/13/22 1247   Altered Skin Integrity Present on Admission: yes   Side: Right   Orientation: posterior   Location: Thigh   Wound Number: #4   Is this injury device related?: No   Primary Wound Type: Other   Description of Altered Skin Integrity: Full thickness tissue loss. Subcutaneous fat may be visible but bone, tendon or muscle are not exposed   Removal Indication and Assessment:    Wound Outcome:    (Retired) Wound Length (cm):    (Retired) Wound Width (cm):    (Retired) Depth (cm):    Wound Description (Comments):    Removal Indications:    Dressing Appearance Moist drainage 07/15/22 1210   Drainage Amount Moderate 07/15/22 1210   Drainage Characteristics/Odor Serosanguineous 07/15/22 1210   Appearance Slough;Hypergranulation;Moist 07/15/22 1210   Tissue loss description Full thickness 07/15/22 1210   Periwound Area Intact;Moist 07/15/22 1210   Wound Edges Defined 07/15/22 1210   Wound Length (cm) 27.5 cm 07/15/22 1210   Wound Width (cm) 12 cm 07/15/22 1210   Wound Depth (cm) 0.2 cm 07/15/22 1210   Wound Volume (cm^3) 66 cm^3 07/15/22 1210   Wound Surface Area (cm^2) 330 cm^2 07/15/22 1210   Care Cleansed with:;Wound cleanser 07/15/22 1210   Dressing Applied;Other (comment) 07/15/22 1210   Dressing Change Due 07/16/22 07/15/22 1210            Altered Skin Integrity 05/13/22 0104 Left posterior Thigh #5 Other (comment) Partial thickness tissue loss. Shallow open ulcer with a red or pink wound bed, without slough. Intact or Open/Ruptured Serum-filled blister. (Active)   05/13/22 0104   Altered Skin Integrity Present on Admission: yes   Side: Left   Orientation: posterior    Location: Thigh   Wound Number: #5   Is this injury device related?: No   Primary Wound Type: Other   Description of Altered Skin Integrity: Partial thickness tissue loss. Shallow open ulcer with a red or pink wound bed, without slough. Intact or Open/Ruptured Serum-filled blister.   Removal Indication and Assessment:    Wound Outcome:    (Retired) Wound Length (cm):    (Retired) Wound Width (cm):    (Retired) Depth (cm):    Wound Description (Comments):    Removal Indications:    Dressing Appearance Moist drainage 07/15/22 1210   Drainage Amount Large 07/15/22 1210   Drainage Characteristics/Odor Serosanguineous 07/15/22 1210   Appearance Slough;Moist;Hypergranulation 07/15/22 1210   Tissue loss description Full thickness 07/15/22 1210   Periwound Area Intact;Moist 07/15/22 1210   Wound Edges Defined 07/15/22 1210   Wound Length (cm) 1.6 cm 07/15/22 1210   Wound Width (cm) 1.2 cm 07/15/22 1210   Wound Depth (cm) 0.2 cm 07/15/22 1210   Wound Volume (cm^3) 0.384 cm^3 07/15/22 1210   Wound Surface Area (cm^2) 1.92 cm^2 07/15/22 1210   Care Cleansed with:;Wound cleanser 07/15/22 1210   Dressing Applied;Other (comment) 07/15/22 1210   Dressing Change Due 07/16/22 07/15/22 1210            Altered Skin Integrity 05/13/22 0107 Sacral spine #6 Other (comment) Full thickness tissue loss with exposed bone, tendon, or muscle. Often includes undermining and tunneling. May extend into muscle and/or supporting structures. (Active)   05/13/22 0107   Altered Skin Integrity Present on Admission: yes   Side:    Orientation:    Location: Sacral spine   Wound Number: #6   Is this injury device related?: No   Primary Wound Type: Other   Description of Altered Skin Integrity: Full thickness tissue loss with exposed bone, tendon, or muscle. Often includes undermining and tunneling. May extend into muscle and/or supporting structures.   Removal Indication and Assessment:    Wound Outcome:    (Retired) Wound Length (cm):    (Retired)  "Wound Width (cm):    (Retired) Depth (cm):    Wound Description (Comments):    Removal Indications:    Dressing Appearance Moist drainage 07/15/22 1210   Drainage Amount Moderate 07/15/22 1210   Drainage Characteristics/Odor Serosanguineous 07/15/22 1210   Appearance Slough;Moist;Hypergranulation 07/15/22 1210   Tissue loss description Full thickness 07/15/22 1210   Periwound Area Intact;Moist 07/15/22 1210   Wound Edges Defined 07/15/22 1210   Wound Length (cm) 10 cm 07/15/22 1210   Wound Width (cm) 8.5 cm 07/15/22 1210   Wound Depth (cm) 0.2 cm 07/15/22 1210   Wound Volume (cm^3) 17 cm^3 07/15/22 1210   Wound Surface Area (cm^2) 85 cm^2 07/15/22 1210   Care Cleansed with:;Wound cleanser 07/15/22 1210   Dressing Applied;Other (comment) 07/15/22 1210   Dressing Change Due 07/16/22 07/15/22 1210            Debridement     Date/Time: 7/15/2022 11:34 AM  Performed by: Modesto Gonzalez MD  Authorized by: Modesto Gonzalez MD     Time out: Immediately prior to procedure a "time out" was called to verify the correct patient, procedure, equipment, support staff and site/side marked as required.   Consent Done?:  Yes (Verbal)  Local anesthesia used?: No       Wound Details:    Location:  Back    Type of Debridement:  Non-excisional       Length (cm):  19       Area (sq cm):  627       Width (cm):  33       Percent Debrided (%):  5       Depth (cm):  0.2       Total Area Debrided (sq cm):  31.35    Depth of debridement:  Epidermis/Dermis    Tissue debrided:  Epidermis    Devitalized tissue debrided:  Fibrin, Biofilm, Exudate and Slough    Instruments:  Curette     Bleeding:  None  Patient tolerance:  Patient tolerated the procedure well with no immediate complications                   Assessment:         ICD-10-CM ICD-9-CM   1. Pressure injury of right leg, stage 4  L89.894 707.09     707.24   2. Pressure injury of sacral region, stage 4  L89.154 707.03     707.24   3. Pressure injury of left leg, stage 4  L89.894 707.09 "     707.24   4. Pressure injury of right upper back, stage 4  L89.114 707.02     707.24         Procedures:   Excisional debridement performed:  [] Yes [x] No   Selective debridement performed:  [x] Yes [] No   Mechanical debridement performed:  [] Yes [x] No   Silver nitrate applied :    [x] Yes [] No   Labs ordered this visit:   [] Yes [x] No   Imaging ordered this visit:   [] Yes [x] No   Tissue pathology and/or culture taken:  [] Yes [x] No     Procedures:  [facility]:  TIMES PER WEEK/DAYS:  ORDERS:  Apply xeroform, ABD pads,and clean dry dressings to be changed daily to all wounds  Patient Orders:  Hydrocodone 10 mg, number 63, prescribed.          Follow up in 3 weeks (on 8/5/2022) for pressure injuries.

## 2022-07-15 NOTE — PROCEDURES
"Debridement    Date/Time: 7/15/2022 11:34 AM  Performed by: Modesto Gonzalez MD  Authorized by: Modesto Gonzalez MD     Time out: Immediately prior to procedure a "time out" was called to verify the correct patient, procedure, equipment, support staff and site/side marked as required.    Consent Done?:  Yes (Verbal)  Local anesthesia used?: No      Wound Details:    Location:  Back    Type of Debridement:  Non-excisional       Length (cm):  19       Area (sq cm):  627       Width (cm):  33       Percent Debrided (%):  5       Depth (cm):  0.2       Total Area Debrided (sq cm):  31.35    Depth of debridement:  Epidermis/Dermis    Tissue debrided:  Epidermis    Devitalized tissue debrided:  Fibrin, Biofilm, Exudate and Slough    Instruments:  Curette    Bleeding:  None  Patient tolerance:  Patient tolerated the procedure well with no immediate complications      "

## 2022-07-26 ENCOUNTER — TELEPHONE (OUTPATIENT)
Dept: FAMILY MEDICINE | Facility: CLINIC | Age: 47
End: 2022-07-26
Payer: MEDICARE

## 2022-07-26 DIAGNOSIS — M62.838 MUSCLE SPASM: Primary | ICD-10-CM

## 2022-07-26 NOTE — TELEPHONE ENCOUNTER
Spoke with patient's sister, stated the muscle spasm are still happening and getting worse wants to know what to do next about this

## 2022-07-26 NOTE — TELEPHONE ENCOUNTER
----- Message from Maribel De Leon sent at 7/26/2022 12:41 PM CDT -----  Regarding: PAtient CAll  Type:  Patient Returning Call    Who Called: Sister Leann Morrison  Who Left Message for Patient: For [facility] Only   Does the patient know what this is regarding?: yes  Would the patient rather a call back or a response via [facility]?    Best Call Back Number: [phone]  Additional Information: requesting call back from [facility] only , no nurse!  Wanting to speak with her about brother's condition

## 2022-07-26 NOTE — TELEPHONE ENCOUNTER
----- Message from Jack Wilson sent at 7/26/2022  2:47 PM CDT -----  .Type:  Needs Medical Advice    Who Called: yuki Morrison's sister  Symptoms (please be specific):    How long has patient had these symptoms:    Pharmacy name and phone #:    Would the patient rather a call back or a response via [facility]?   Best Call Back Number: [phone]   Additional Information: She was returning a call from the office. Please give her a call back.

## 2022-07-27 RX ORDER — CYCLOBENZAPRINE HCL 5 MG
5 TABLET ORAL 3 TIMES DAILY PRN
Qty: 30 TABLET | Refills: 0 | Status: SHIPPED | OUTPATIENT
Start: 2022-07-27 | End: 2022-08-06

## 2022-07-28 RX ORDER — ZOLPIDEM TARTRATE 10 MG/1
10 TABLET ORAL NIGHTLY PRN
Qty: 30 TABLET | Refills: 5 | Status: SHIPPED | OUTPATIENT
Start: 2022-07-28 | End: 2022-09-22 | Stop reason: SDUPTHER

## 2022-08-01 ENCOUNTER — TELEPHONE (OUTPATIENT)
Dept: FAMILY MEDICINE | Facility: CLINIC | Age: 47
End: 2022-08-01
Payer: MEDICARE

## 2022-08-01 NOTE — TELEPHONE ENCOUNTER
----- Message from Quita Boogie MD sent at 8/1/2022 10:57 AM CDT -----  Patient may need to go to Urgent Care or see Dr. Mallory for evaluation and treatment, and workup since he is still having symptoms. I don't have any openings today. Thanks.   ----- Message -----  From: Nicolas Cassidy MA  Sent: 8/1/2022  10:48 AM CDT  To: Quita Boogie MD      ----- Message -----  From: Jack Wilson  Sent: 8/1/2022  10:21 AM CDT  To: [facility]    .Type:  Needs Medical Advice    Who Called: Tamiko mirza's sister  Symptoms (please be specific):    How long has patient had these symptoms:    Pharmacy name and phone #:    Would the patient rather a call back or a response via [facility]?   Best Call Back Number: [phone]  Additional Information: he is still having bad muscle spasm lasting for hours, could be also that he is consumption .  Please give her a call back.

## 2022-08-05 ENCOUNTER — HOSPITAL ENCOUNTER (OUTPATIENT)
Dept: WOUND CARE | Facility: HOSPITAL | Age: 47
Discharge: HOME | End: 2022-08-05
Attending: FAMILY MEDICINE
Payer: MEDICARE

## 2022-08-05 VITALS
HEART RATE: 79 BPM | RESPIRATION RATE: 19 BRPM | BODY MASS INDEX: 23.19 KG/M2 | HEIGHT: 73 IN | WEIGHT: 175 LBS | SYSTOLIC BLOOD PRESSURE: 121 MMHG | DIASTOLIC BLOOD PRESSURE: 73 MMHG

## 2022-08-05 DIAGNOSIS — L89.154 PRESSURE INJURY OF SACRAL REGION, STAGE 4: ICD-10-CM

## 2022-08-05 DIAGNOSIS — L89.114 PRESSURE INJURY OF RIGHT UPPER BACK, STAGE 4: ICD-10-CM

## 2022-08-05 DIAGNOSIS — L89.894 PRESSURE INJURY OF LEFT LEG, STAGE 4: ICD-10-CM

## 2022-08-05 DIAGNOSIS — L89.894 PRESSURE INJURY OF RIGHT LEG, STAGE 4: Primary | ICD-10-CM

## 2022-08-05 PROCEDURE — 99214 OFFICE O/P EST MOD 30 MIN: CPT

## 2022-08-05 PROCEDURE — 17250 CHEM CAUT OF GRANLTJ TISSUE: CPT

## 2022-08-05 PROCEDURE — 27000999 HC MEDICAL RECORD PHOTO DOCUMENTATION

## 2022-08-05 RX ORDER — HYDROCODONE BITARTRATE AND ACETAMINOPHEN 10; 325 MG/1; MG/1
1 TABLET ORAL EVERY 8 HOURS PRN
Qty: 63 TABLET | Refills: 0 | Status: SHIPPED | OUTPATIENT
Start: 2022-08-05 | End: 2022-08-26 | Stop reason: SDUPTHER

## 2022-08-05 NOTE — PROGRESS NOTES
"   Subjective:       Patient ID: Modesto Payton is a 47 y.o. male.    Chief Complaint: Pressure Ulcer (#1) Right lower leg - stage 4/#2) Back - stage 4/#3) Right posterior arm - stage 4/#4) Right posterior thigh - stage 4/#5) Left posterior thigh - stage 2/#6) Sacrum - stage 4)    47-year-old  male, who is here for a followup of his multiple pressure ulcers, secondary to quadriplegia.  He has been coming here every 3 weeks.  His wounds are slowly improving.  He denies fever.  His sister is not here with him today.    Review of Systems   Constitutional: Negative.    Respiratory: Negative.    Cardiovascular: Negative.    Skin:        As documented in the HPI.   All other systems reviewed and are negative.        Objective:       Vitals:    08/05/22 1226   BP: 121/73   Pulse: 79   Resp: 19   Weight: 79.4 kg (175 lb)   Height: 6' 1" (1.854 m)       Physical Exam  Vitals and nursing note reviewed.   Constitutional:       Appearance: Normal appearance.   Cardiovascular:      Rate and Rhythm: Normal rate.   Pulmonary:      Effort: Pulmonary effort is normal.   Skin:     General: Skin is warm and dry.      Comments: There are multiple pressure ulcers on his back side.  I used silver nitrate to treat the hypergranulation.  1., right lower leg, treated with silver nitrate.  Tumor 2, back, cauterized with silver nitrate, hypergranulation treated.  3. Right arm, hypergranulation, cauterized with silver nitrate.  4. Right posterior thigh, angulation tissue treated with silver nitrate.  5., left posterior thigh, hypergranulation treated with silver nitrate.  6. , sacral, hypergranulation treated with silver nitrate.     Neurological:      Mental Status: He is alert.            Altered Skin Integrity 05/13/22 1232 Right lower Leg #1 Other (comment) Full thickness tissue loss. Subcutaneous fat may be visible but bone, tendon or muscle are not exposed (Active)   05/13/22 1232   Altered Skin Integrity Present on " Admission: yes   Side: Right   Orientation: lower   Location: Leg   Wound Number: #1   Is this injury device related?: No   Primary Wound Type: Other   Description of Altered Skin Integrity: Full thickness tissue loss. Subcutaneous fat may be visible but bone, tendon or muscle are not exposed   Removal Indication and Assessment:    Wound Outcome:    (Retired) Wound Length (cm):    (Retired) Wound Width (cm):    (Retired) Depth (cm):    Wound Description (Comments):    Removal Indications:    Dressing Appearance Moist drainage 08/05/22 1227   Drainage Amount Moderate 08/05/22 1227   Drainage Characteristics/Odor Serosanguineous 08/05/22 1227   Appearance Slough;Red;Hypergranulation;Moist 08/05/22 1227   Periwound Area Intact;Moist 08/05/22 1227   Wound Edges Open 08/05/22 1227   Wound Length (cm) 2.8 cm 08/05/22 1227   Wound Width (cm) 2 cm 08/05/22 1227   Wound Depth (cm) 0.2 cm 08/05/22 1227   Wound Volume (cm^3) 1.12 cm^3 08/05/22 1227   Wound Surface Area (cm^2) 5.6 cm^2 08/05/22 1227   Care Cleansed with:;Wound cleanser 08/05/22 1227   Dressing Applied;Other (comment) 08/05/22 1227   Dressing Change Due 08/06/22 08/05/22 1227            Altered Skin Integrity 05/13/22 1237 Back #2 Other (comment) Full thickness tissue loss with exposed bone, tendon, or muscle. Often includes undermining and tunneling. May extend into muscle and/or supporting structures. (Active)   05/13/22 1237   Altered Skin Integrity Present on Admission: yes   Side:    Orientation:    Location: Back   Wound Number: #2   Is this injury device related?: No   Primary Wound Type: Other   Description of Altered Skin Integrity: Full thickness tissue loss with exposed bone, tendon, or muscle. Often includes undermining and tunneling. May extend into muscle and/or supporting structures.   Removal Indication and Assessment:    Wound Outcome:    (Retired) Wound Length (cm):    (Retired) Wound Width (cm):    (Retired) Depth (cm):    Wound Description  (Comments):    Removal Indications:    Dressing Appearance Moist drainage 08/05/22 1227   Drainage Amount Moderate 08/05/22 1227   Drainage Characteristics/Odor Serosanguineous 08/05/22 1227   Appearance Slough;Hypergranulation;Moist;Red 08/05/22 1227   Tissue loss description Full thickness 08/05/22 1227   Periwound Area Intact;Moist 08/05/22 1227   Wound Edges Open 08/05/22 1227   Wound Length (cm) 1 cm 08/05/22 1227   Wound Width (cm) 25 cm 08/05/22 1227   Wound Depth (cm) 0.2 cm 08/05/22 1227   Wound Volume (cm^3) 5 cm^3 08/05/22 1227   Wound Surface Area (cm^2) 25 cm^2 08/05/22 1227   Care Cleansed with:;Wound cleanser 08/05/22 1227   Dressing Applied;Other (comment) 08/05/22 1227   Dressing Change Due 08/06/22 08/05/22 1227            Altered Skin Integrity 05/13/22 1241 Right upper;posterior Arm #3 Other (comment) Full thickness tissue loss. Subcutaneous fat may be visible but bone, tendon or muscle are not exposed (Active)   05/13/22 1241   Altered Skin Integrity Present on Admission: yes   Side: Right   Orientation: upper;posterior   Location: Arm   Wound Number: #3   Is this injury device related?: No   Primary Wound Type: Other   Description of Altered Skin Integrity: Full thickness tissue loss. Subcutaneous fat may be visible but bone, tendon or muscle are not exposed   Removal Indication and Assessment:    Wound Outcome:    (Retired) Wound Length (cm):    (Retired) Wound Width (cm):    (Retired) Depth (cm):    Wound Description (Comments):    Removal Indications:    Dressing Appearance Moist drainage 08/05/22 1227   Drainage Amount Moderate 08/05/22 1227   Drainage Characteristics/Odor Serosanguineous 08/05/22 1227   Appearance Slough;Hypergranulation;Moist;Red 08/05/22 1227   Tissue loss description Full thickness 08/05/22 1227   Periwound Area Intact;Moist 08/05/22 1227   Wound Edges Open 08/05/22 1227   Wound Length (cm) 2.5 cm 08/05/22 1227   Wound Width (cm) 1.5 cm 08/05/22 1227   Wound Depth (cm)  0.2 cm 08/05/22 1227   Wound Volume (cm^3) 0.75 cm^3 08/05/22 1227   Wound Surface Area (cm^2) 3.75 cm^2 08/05/22 1227   Care Cleansed with:;Wound cleanser 08/05/22 1227   Dressing Applied;Other (comment) 08/05/22 1227   Dressing Change Due 08/06/22 08/05/22 1227            Altered Skin Integrity 05/13/22 1247 Right posterior Thigh #4 Other (comment) Full thickness tissue loss. Subcutaneous fat may be visible but bone, tendon or muscle are not exposed (Active)   05/13/22 1247   Altered Skin Integrity Present on Admission: yes   Side: Right   Orientation: posterior   Location: Thigh   Wound Number: #4   Is this injury device related?: No   Primary Wound Type: Other   Description of Altered Skin Integrity: Full thickness tissue loss. Subcutaneous fat may be visible but bone, tendon or muscle are not exposed   Removal Indication and Assessment:    Wound Outcome:    (Retired) Wound Length (cm):    (Retired) Wound Width (cm):    (Retired) Depth (cm):    Wound Description (Comments):    Removal Indications:    Dressing Appearance Moist drainage 08/05/22 1227   Drainage Amount Moderate 08/05/22 1227   Drainage Characteristics/Odor Serosanguineous 08/05/22 1227   Appearance Red;Slough;Hypergranulation;Moist 08/05/22 1227   Tissue loss description Full thickness 08/05/22 1227   Periwound Area Intact;Moist 08/05/22 1227   Wound Edges Open 08/05/22 1227   Wound Length (cm) 22 cm 08/05/22 1227   Wound Width (cm) 10.5 cm 08/05/22 1227   Wound Depth (cm) 0.2 cm 08/05/22 1227   Wound Volume (cm^3) 46.2 cm^3 08/05/22 1227   Wound Surface Area (cm^2) 231 cm^2 08/05/22 1227   Care Cleansed with:;Wound cleanser 08/05/22 1227   Dressing Applied;Other (comment) 08/05/22 1227   Dressing Change Due 08/06/22 08/05/22 1227            Altered Skin Integrity 05/13/22 0104 Left posterior Thigh #5 Other (comment) Partial thickness tissue loss. Shallow open ulcer with a red or pink wound bed, without slough. Intact or Open/Ruptured Serum-filled  blister. (Active)   05/13/22 0104   Altered Skin Integrity Present on Admission: yes   Side: Left   Orientation: posterior   Location: Thigh   Wound Number: #5   Is this injury device related?: No   Primary Wound Type: Other   Description of Altered Skin Integrity: Partial thickness tissue loss. Shallow open ulcer with a red or pink wound bed, without slough. Intact or Open/Ruptured Serum-filled blister.   Removal Indication and Assessment:    Wound Outcome:    (Retired) Wound Length (cm):    (Retired) Wound Width (cm):    (Retired) Depth (cm):    Wound Description (Comments):    Removal Indications:    Dressing Appearance Moist drainage 08/05/22 1227   Drainage Amount Moderate 08/05/22 1227   Drainage Characteristics/Odor Serosanguineous 08/05/22 1227   Appearance Slough;Red;Hypergranulation;Moist 08/05/22 1227   Tissue loss description Full thickness 08/05/22 1227   Periwound Area Intact;Moist 08/05/22 1227   Wound Edges Open 08/05/22 1227   Wound Length (cm) 3 cm 08/05/22 1227   Wound Width (cm) 3 cm 08/05/22 1227   Wound Depth (cm) 0.2 cm 08/05/22 1227   Wound Volume (cm^3) 1.8 cm^3 08/05/22 1227   Wound Surface Area (cm^2) 9 cm^2 08/05/22 1227   Care Cleansed with:;Wound cleanser 08/05/22 1227   Dressing Applied;Other (comment) 08/05/22 1227   Dressing Change Due 08/06/22 08/05/22 1227            Altered Skin Integrity 05/13/22 0107 Sacral spine #6 Other (comment) Full thickness tissue loss with exposed bone, tendon, or muscle. Often includes undermining and tunneling. May extend into muscle and/or supporting structures. (Active)   05/13/22 0107   Altered Skin Integrity Present on Admission: yes   Side:    Orientation:    Location: Sacral spine   Wound Number: #6   Is this injury device related?: No   Primary Wound Type: Other   Description of Altered Skin Integrity: Full thickness tissue loss with exposed bone, tendon, or muscle. Often includes undermining and tunneling. May extend into muscle and/or  supporting structures.   Removal Indication and Assessment:    Wound Outcome:    (Retired) Wound Length (cm):    (Retired) Wound Width (cm):    (Retired) Depth (cm):    Wound Description (Comments):    Removal Indications:    Dressing Appearance Moist drainage 08/05/22 1227   Drainage Amount Moderate 08/05/22 1227   Drainage Characteristics/Odor Serosanguineous 08/05/22 1227   Appearance Slough;Red;Moist;Hypergranulation 08/05/22 1227   Tissue loss description Full thickness 08/05/22 1227   Periwound Area Intact;Moist 08/05/22 1227   Wound Edges Open 08/05/22 1227   Wound Length (cm) 4 cm 08/05/22 1227   Wound Width (cm) 1 cm 08/05/22 1227   Wound Depth (cm) 0.2 cm 08/05/22 1227   Wound Volume (cm^3) 0.8 cm^3 08/05/22 1227   Wound Surface Area (cm^2) 4 cm^2 08/05/22 1227   Care Cleansed with:;Wound cleanser 08/05/22 1227   Dressing Applied;Other (comment) 08/05/22 1227   Dressing Change Due 08/06/22 08/05/22 1227         Assessment:         ICD-10-CM ICD-9-CM   1. Pressure injury of right leg, stage 4  L89.894 707.09     707.24   2. Pressure injury of sacral region, stage 4  L89.154 707.03     707.24   3. Pressure injury of left leg, stage 4  L89.894 707.09     707.24   4. Pressure injury of right upper back, stage 4  L89.114 707.02     707.24         Procedures:   Excisional debridement performed:  [] Yes [x] No   Selective debridement performed:  [] Yes [x] No   Mechanical debridement performed:  [x] Yes [] No   Silver nitrate applied :    [x] Yes [] No   Labs ordered this visit:   [] Yes [x] No   Imaging ordered this visit:   [] Yes [x] No   Tissue pathology and/or culture taken:  [] Yes [x] No     Procedures:  HOME HEALTH AGENCY:  none  TIMES PER WEEK/DAYS:  ORDERS:  Right lower leg wound: Cleanse with wound cleanser, apply xeroform, 6x6 gentle foam border to be changed daily  Back wound: Cleanse with wound cleanser, apply xeroform, ABD pad, tape to be changed daily  Right posterior upper arm wound: Cleanse with  wound cleanser, apply xeroform, 6x6 gentle foam border to be changed daily  Right posterior thigh wound: Cleanse with wound cleanser, apply xeroform, ABD pad, tape to be changed daily  Left posterior thigh wound: Cleanse with wound cleanser, apply xeroform, ABD pad, tape to be changed daily  Sacral wound: Cleanse with wound cleanser, apply xeroform, ABD pad, tape to be changed daily  Patient Orders:                 Follow up in 3 weeks (on 8/26/2022) for pressure injuries .

## 2022-08-26 ENCOUNTER — HOSPITAL ENCOUNTER (OUTPATIENT)
Dept: WOUND CARE | Facility: HOSPITAL | Age: 47
Discharge: HOME | End: 2022-08-26
Attending: FAMILY MEDICINE
Payer: MEDICARE

## 2022-08-26 VITALS
SYSTOLIC BLOOD PRESSURE: 130 MMHG | HEART RATE: 73 BPM | DIASTOLIC BLOOD PRESSURE: 71 MMHG | WEIGHT: 175 LBS | BODY MASS INDEX: 23.19 KG/M2 | HEIGHT: 73 IN | RESPIRATION RATE: 20 BRPM

## 2022-08-26 DIAGNOSIS — L89.154 PRESSURE INJURY OF SACRAL REGION, STAGE 4: ICD-10-CM

## 2022-08-26 DIAGNOSIS — L89.894 PRESSURE INJURY OF RIGHT LEG, STAGE 4: Primary | ICD-10-CM

## 2022-08-26 DIAGNOSIS — L89.894 PRESSURE INJURY OF LEFT LEG, STAGE 4: ICD-10-CM

## 2022-08-26 DIAGNOSIS — L89.114 PRESSURE INJURY OF RIGHT UPPER BACK, STAGE 4: ICD-10-CM

## 2022-08-26 PROCEDURE — 99214 OFFICE O/P EST MOD 30 MIN: CPT

## 2022-08-26 PROCEDURE — 27000999 HC MEDICAL RECORD PHOTO DOCUMENTATION

## 2022-08-26 PROCEDURE — 17250 CHEM CAUT OF GRANLTJ TISSUE: CPT

## 2022-08-26 PROCEDURE — 11046 DBRDMT MUSC&/FSCA EA ADDL: CPT

## 2022-08-26 PROCEDURE — 11043 DBRDMT MUSC&/FSCA 1ST 20/<: CPT

## 2022-08-26 RX ORDER — HYDROCODONE BITARTRATE AND ACETAMINOPHEN 10; 325 MG/1; MG/1
1 TABLET ORAL EVERY 8 HOURS PRN
Qty: 63 TABLET | Refills: 0 | Status: SHIPPED | OUTPATIENT
Start: 2022-08-26 | End: 2022-09-16

## 2022-08-26 NOTE — PROGRESS NOTES
"   Subjective:       Patient ID: Modesto Payton is a 47 y.o. male.    Chief Complaint: Pressure Ulcer (Right lower leg- Stage 4/Back- Stage 4/Right posterior arm- Stage 4/Right posterior thigh- Stage 4/Left posterior thigh- Stage 2/Sacrum- Stage 4)    47-year-old  male, quadriplegia, who is here for follow up of his multiple pressure ulcers.  They are actually improving as this is the takes good care of him at home.  He has a very good mattress at home, probably a low air loss.  He is in good spirits today.  He needs is pain medications refilled.    Review of Systems   Constitutional: Negative.    Respiratory: Negative.    Cardiovascular: Negative.    Skin:        As documented in the HPI.   All other systems reviewed and are negative.        Objective:       Vitals:    08/26/22 1225   BP: 130/71   Pulse: 73   Resp: 20   Weight: 79.4 kg (175 lb)   Height: 6' 1" (1.854 m)       Physical Exam  Vitals and nursing note reviewed. Exam conducted with a chaperone present.   Constitutional:       Appearance: Normal appearance.   Cardiovascular:      Rate and Rhythm: Normal rate.   Pulmonary:      Effort: Pulmonary effort is normal.   Skin:     General: Skin is warm and dry.      Comments: There are multiple pressure ulcers on his back side, none of which need surgical debridement today.  Wound 1., right lower leg, silver nitrate applied to the hypergranulation  Wound 2., back, silver nitrate again applied to hypergranulation.  There was also some bleeding, cauterized with silver nitrate.  Wound 3., right posterior arm, cauterized with silver nitrate.  Wound 4., right posterior thigh, hypergranulation treated with silver nitrate.  Wound 6., sacrum, hypergranulation treated with silver nitrate.  He tolerated these treatments very well.     Neurological:      Mental Status: He is alert.       Left posterior thigh    Right posterior thigh    Right upper posterior arm    Sacrum    Back    Right lower leg        "  Altered Skin Integrity 05/13/22 1232 Right lower Leg #1 Other (comment) Full thickness tissue loss. Subcutaneous fat may be visible but bone, tendon or muscle are not exposed (Active)   05/13/22 1232   Altered Skin Integrity Present on Admission: yes   Side: Right   Orientation: lower   Location: Leg   Wound Number: #1   Is this injury device related?: No   Primary Wound Type: Other   Description of Altered Skin Integrity: Full thickness tissue loss. Subcutaneous fat may be visible but bone, tendon or muscle are not exposed   Removal Indication and Assessment:    Wound Outcome:    (Retired) Wound Length (cm):    (Retired) Wound Width (cm):    (Retired) Depth (cm):    Wound Description (Comments):    Removal Indications:    Description of Altered Skin Integrity Full thickness tissue loss. Subcutaneous fat may be visible but bone, tendon or muscle are not exposed 08/26/22 1226   Dressing Appearance Moist drainage 08/26/22 1226   Drainage Amount Moderate 08/26/22 1226   Drainage Characteristics/Odor Sanguineous 08/26/22 1226   Appearance Intact;Pink;Bleeding;Moist;Hypergranulation 08/26/22 1226   Tissue loss description Full thickness 08/26/22 1226   Periwound Area Intact;Moist;Pink 08/26/22 1226   Wound Edges Open 08/26/22 1226   Wound Length (cm) 4.5 cm 08/26/22 1226   Wound Width (cm) 2.5 cm 08/26/22 1226   Wound Depth (cm) 0.2 cm 08/26/22 1226   Wound Volume (cm^3) 2.25 cm^3 08/26/22 1226   Wound Surface Area (cm^2) 11.25 cm^2 08/26/22 1226   Care Cleansed with:;Wound cleanser 08/26/22 1226   Dressing Applied 08/26/22 1226   Dressing Change Due 08/27/22 08/26/22 1226            Altered Skin Integrity 05/13/22 1237 Back #2 Other (comment) Full thickness tissue loss with exposed bone, tendon, or muscle. Often includes undermining and tunneling. May extend into muscle and/or supporting structures. (Active)   05/13/22 1237   Altered Skin Integrity Present on Admission: yes   Side:    Orientation:    Location: Back    Wound Number: #2   Is this injury device related?: No   Primary Wound Type: Other   Description of Altered Skin Integrity: Full thickness tissue loss with exposed bone, tendon, or muscle. Often includes undermining and tunneling. May extend into muscle and/or supporting structures.   Removal Indication and Assessment:    Wound Outcome:    (Retired) Wound Length (cm):    (Retired) Wound Width (cm):    (Retired) Depth (cm):    Wound Description (Comments):    Removal Indications:    Description of Altered Skin Integrity Full thickness tissue loss with exposed bone, tendon, or muscle. Often includes undermining and tunneling. May extend into muscle and/or supporting structures. 08/26/22 1226   Dressing Appearance Intact;Moist drainage 08/26/22 1226   Drainage Amount Moderate 08/26/22 1226   Drainage Characteristics/Odor Serosanguineous 08/26/22 1226   Appearance Intact;Pink;Hypergranulation;Moist;Tendon 08/26/22 1226   Tissue loss description Full thickness 08/26/22 1226   Periwound Area Intact;[place];Moist 08/26/22 1226   Wound Edges Open 08/26/22 1226   Wound Length (cm) 15 cm 08/26/22 1226   Wound Width (cm) 27 cm 08/26/22 1226   Wound Depth (cm) 0.2 cm 08/26/22 1226   Wound Volume (cm^3) 81 cm^3 08/26/22 1226   Wound Surface Area (cm^2) 405 cm^2 08/26/22 1226   Care Cleansed with:;Wound cleanser 08/26/22 1226   Dressing Applied 08/26/22 1226   Dressing Change Due 08/27/22 08/26/22 1226            Altered Skin Integrity 05/13/22 1241 Right upper;posterior Arm #3 Other (comment) Full thickness tissue loss. Subcutaneous fat may be visible but bone, tendon or muscle are not exposed (Active)   05/13/22 1241   Altered Skin Integrity Present on Admission: yes   Side: Right   Orientation: upper;posterior   Location: Arm   Wound Number: #3   Is this injury device related?: No   Primary Wound Type: Other   Description of Altered Skin Integrity: Full thickness tissue loss. Subcutaneous fat may be visible but bone, tendon or  muscle are not exposed   Removal Indication and Assessment:    Wound Outcome:    (Retired) Wound Length (cm):    (Retired) Wound Width (cm):    (Retired) Depth (cm):    Wound Description (Comments):    Removal Indications:    Description of Altered Skin Integrity Full thickness tissue loss. Subcutaneous fat may be visible but bone, tendon or muscle are not exposed 08/26/22 1226   Dressing Appearance Intact;Moist drainage 08/26/22 1226   Drainage Amount Moderate 08/26/22 1226   Appearance Pink;Intact;Hypergranulation;Moist 08/26/22 1226   Tissue loss description Full thickness 08/26/22 1226   Periwound Area Moist;Intact;Pink 08/26/22 1226   Wound Edges Open 08/26/22 1226   Wound Length (cm) 2 cm 08/26/22 1226   Wound Width (cm) 3 cm 08/26/22 1226   Wound Depth (cm) 0.2 cm 08/26/22 1226   Wound Volume (cm^3) 1.2 cm^3 08/26/22 1226   Wound Surface Area (cm^2) 6 cm^2 08/26/22 1226   Care Cleansed with:;Wound cleanser 08/26/22 1226   Dressing Applied 08/26/22 1226   Dressing Change Due 08/27/22 08/26/22 1226         Assessment:         ICD-10-CM ICD-9-CM   1. Pressure injury of right leg, stage 4  L89.894 707.09     707.24   2. Pressure injury of sacral region, stage 4  L89.154 707.03     707.24   3. Pressure injury of left leg, stage 4  L89.894 707.09     707.24   4. Pressure injury of right upper back, stage 4  L89.114 707.02     707.24         Procedures:   Excisional debridement performed:  [] Yes [x] No   Selective debridement performed:  [] Yes [x] No   Mechanical debridement performed:  [] Yes [x] No   Silver nitrate applied :    [x] Yes [] No   Labs ordered this visit:   [] Yes [x] No   Imaging ordered this visit:   [] Yes [x] No   Tissue pathology and/or culture taken:  [] Yes [x] No     Procedures:  HOME HEALTH AGENCY:  none  TIMES PER WEEK/DAYS:  ORDERS:  Right lower leg wound: Cleanse with wound cleanser, apply xeroform, 6x6 gentle foam border to be changed daily  Back wound: Cleanse with wound cleanser,  apply xeroform, ABD pad, tape to be changed daily  Right posterior upper arm wound: Cleanse with wound cleanser, apply xeroform, 6x6 gentle foam border to be changed daily  Right posterior thigh wound: Cleanse with wound cleanser, apply xeroform, ABD pad, tape to be changed daily  Left posterior thigh wound: Cleanse with wound cleanser, apply xeroform, ABD pad, tape to be changed daily  Sacral wound: Cleanse with wound cleanser, apply xeroform, ABD pad, tape to be changed daily    Patient Orders:  Hydrocodone 10 mg, number 63, prescribed               Follow up in about 3 weeks (around 9/16/2022) for ALOT of wounds :/.

## 2022-09-22 RX ORDER — BACLOFEN 10 MG/1
10 TABLET ORAL 3 TIMES DAILY
Qty: 90 TABLET | Refills: 2 | Status: SHIPPED | OUTPATIENT
Start: 2022-09-22 | End: 2022-11-09 | Stop reason: SDUPTHER

## 2022-09-22 RX ORDER — FERROUS GLUCONATE 324(38)MG
324 TABLET ORAL
Qty: 30 TABLET | Refills: 2 | Status: SHIPPED | OUTPATIENT
Start: 2022-09-22 | End: 2022-11-09 | Stop reason: SDUPTHER

## 2022-09-22 RX ORDER — ZOLPIDEM TARTRATE 10 MG/1
10 TABLET ORAL NIGHTLY PRN
Qty: 30 TABLET | Refills: 5 | Status: SHIPPED | OUTPATIENT
Start: 2022-09-22 | End: 2022-11-09 | Stop reason: SDUPTHER

## 2022-09-22 RX ORDER — FERROUS GLUCONATE 324(38)MG
324 TABLET ORAL
COMMUNITY
End: 2022-09-22 | Stop reason: SDUPTHER

## 2022-09-22 RX ORDER — MINOCYCLINE HYDROCHLORIDE 100 MG/1
100 CAPSULE ORAL DAILY
Qty: 30 CAPSULE | Refills: 2 | Status: SHIPPED | OUTPATIENT
Start: 2022-09-22 | End: 2022-11-09 | Stop reason: SDUPTHER

## 2022-09-22 RX ORDER — PROMETHAZINE HYDROCHLORIDE 6.25 MG/5ML
6.25 SYRUP ORAL EVERY 6 HOURS PRN
Qty: 473 ML | Refills: 3 | Status: SHIPPED | OUTPATIENT
Start: 2022-09-22 | End: 2022-11-09 | Stop reason: SDUPTHER

## 2022-09-22 RX ORDER — TRAZODONE HYDROCHLORIDE 50 MG/1
50 TABLET ORAL NIGHTLY PRN
Qty: 30 TABLET | Refills: 2 | Status: SHIPPED | OUTPATIENT
Start: 2022-09-22 | End: 2022-11-09 | Stop reason: SDUPTHER

## 2022-09-22 RX ORDER — BACLOFEN 10 MG/1
10 TABLET ORAL 3 TIMES DAILY
COMMUNITY
End: 2022-09-22 | Stop reason: SDUPTHER

## 2022-09-22 NOTE — TELEPHONE ENCOUNTER
----- Message from Jack Wilson sent at 9/22/2022  9:01 AM CDT -----  .Type:  Needs Medical Advice    Who Called: Pt sister   Symptoms (please be specific):    How long has patient had these symptoms:    Pharmacy name and phone #:    Would the patient rather a call back or a response via [facility]?   Best Call Back Number: [phone]  Additional Information: Patient's sister needed to speak it about his medication.

## 2022-09-30 ENCOUNTER — HOSPITAL ENCOUNTER (OUTPATIENT)
Dept: WOUND CARE | Facility: HOSPITAL | Age: 47
Discharge: HOME | End: 2022-09-30
Attending: FAMILY MEDICINE
Payer: MEDICARE

## 2022-09-30 VITALS
HEART RATE: 75 BPM | SYSTOLIC BLOOD PRESSURE: 117 MMHG | DIASTOLIC BLOOD PRESSURE: 73 MMHG | BODY MASS INDEX: 23.19 KG/M2 | WEIGHT: 175 LBS | HEIGHT: 73 IN | RESPIRATION RATE: 18 BRPM

## 2022-09-30 DIAGNOSIS — L89.894 PRESSURE INJURY OF LEFT LEG, STAGE 4: ICD-10-CM

## 2022-09-30 DIAGNOSIS — L89.114 PRESSURE INJURY OF RIGHT UPPER BACK, STAGE 4: ICD-10-CM

## 2022-09-30 DIAGNOSIS — L89.894 PRESSURE INJURY OF RIGHT LEG, STAGE 4: Primary | ICD-10-CM

## 2022-09-30 DIAGNOSIS — L89.154 PRESSURE INJURY OF SACRAL REGION, STAGE 4: ICD-10-CM

## 2022-09-30 PROCEDURE — 17250 CHEM CAUT OF GRANLTJ TISSUE: CPT

## 2022-09-30 PROCEDURE — 27000999 HC MEDICAL RECORD PHOTO DOCUMENTATION

## 2022-09-30 PROCEDURE — 99213 OFFICE O/P EST LOW 20 MIN: CPT

## 2022-09-30 RX ORDER — HYDROCODONE BITARTRATE AND ACETAMINOPHEN 10; 325 MG/1; MG/1
1 TABLET ORAL EVERY 8 HOURS PRN
Qty: 63 TABLET | Refills: 0 | Status: SHIPPED | OUTPATIENT
Start: 2022-09-30 | End: 2022-09-30

## 2022-09-30 RX ORDER — HYDROCODONE BITARTRATE AND ACETAMINOPHEN 7.5; 325 MG/1; MG/1
TABLET ORAL
COMMUNITY
Start: 2022-04-29 | End: 2022-11-04 | Stop reason: SDUPTHER

## 2022-09-30 NOTE — PROGRESS NOTES
"   Subjective:       Patient ID: Modesto Payton is a 47 y.o. male.    Chief Complaint: Pressure Ulcer (Right lower leg - Stage 4/Back - Stage 4/Right posterior arm - Stage 4/Right posterior thigh - Stage 4/Left posterior thigh - Stage 2/Sacrum - Stage 4)     47-year-old  male, with quadriplegia, is here for his 3 week visit, for chronic pressure injuries scattered throughout most of his posterior body.  He has no new complaints.    His sister is taking good care of him.    Review of Systems   Constitutional: Negative.    Respiratory: Negative.     Skin:         As documented in the HPI.   All other systems reviewed and are negative.      Objective:      Vitals:    09/30/22 1243   BP: 117/73   BP Location: Right arm   Patient Position: Lying   Pulse: 75   Resp: 18   Weight: 79.4 kg (175 lb)   Height: 6' 1" (1.854 m)        Physical Exam  Vitals and nursing note reviewed. Exam conducted with a chaperone present.   Constitutional:       Appearance: Normal appearance.   Cardiovascular:      Rate and Rhythm: Normal rate.   Pulmonary:      Effort: Pulmonary effort is normal.   Skin:     General: Skin is warm and dry.      Comments: The wounds all appear clean, without secondary infection.    Wound 1., right lower leg, cleaned and treated with silver nitrate, the hypergranulation.    Wound 2. , back, hypergranulation treated with silver nitrate.    Wound 3. , right posterior arm, hypergranulation treated with silver nitrate.    Wound 4., right posterior thigh, hypergranulation treated with silver nitrate.    Wound 5., left thigh, hypergranulation treated with silver nitrate.    Wound 6. , sacrum, hypergranulation treated with silver nitrate.   Neurological:      Mental Status: He is alert.       Sacrum     Right posterior thigh     Right upper posterior arm     back     Right lower leg       Altered Skin Integrity 05/13/22 1232 Right lower Leg #1 Other (comment) Full thickness tissue loss. Subcutaneous " fat may be visible but bone, tendon or muscle are not exposed (Active)   05/13/22 1232   Altered Skin Integrity Present on Admission: yes   Side: Right   Orientation: lower   Location: Leg   Wound Number: #1   Is this injury device related?: No   Primary Wound Type: Other   Description of Altered Skin Integrity: Full thickness tissue loss. Subcutaneous fat may be visible but bone, tendon or muscle are not exposed   Ankle-Brachial Index:    Pulses:    Removal Indication and Assessment:    Wound Outcome:    (Retired) Wound Length (cm):    (Retired) Wound Width (cm):    (Retired) Depth (cm):    Wound Description (Comments):    Removal Indications:    Dressing Appearance Moist drainage 09/30/22 1245   Drainage Amount Moderate 09/30/22 1245   Drainage Characteristics/Odor Serosanguineous 09/30/22 1245   Appearance Hypergranulation;Red;Moist 09/30/22 1245   Tissue loss description Full thickness 09/30/22 1245   Periwound Area Intact;Moist 09/30/22 1245   Wound Edges Open 09/30/22 1245   Wound Length (cm) 2.1 cm 09/30/22 1245   Wound Width (cm) 1.7 cm 09/30/22 1245   Wound Depth (cm) 0.2 cm 09/30/22 1245   Wound Volume (cm^3) 0.714 cm^3 09/30/22 1245   Wound Surface Area (cm^2) 3.57 cm^2 09/30/22 1245   Care Cleansed with:;Wound cleanser 09/30/22 1245   Dressing Applied;Other (comment) 09/30/22 1245   Dressing Change Due 10/01/22 09/30/22 1245            Altered Skin Integrity 05/13/22 1237 Back #2 Other (comment) Full thickness tissue loss with exposed bone, tendon, or muscle. Often includes undermining and tunneling. May extend into muscle and/or supporting structures. (Active)   05/13/22 1237   Altered Skin Integrity Present on Admission: yes   Side:    Orientation:    Location: Back   Wound Number: #2   Is this injury device related?: No   Primary Wound Type: Other   Description of Altered Skin Integrity: Full thickness tissue loss with exposed bone, tendon, or muscle. Often includes undermining and tunneling. May  extend into muscle and/or supporting structures.   Ankle-Brachial Index:    Pulses:    Removal Indication and Assessment:    Wound Outcome:    (Retired) Wound Length (cm):    (Retired) Wound Width (cm):    (Retired) Depth (cm):    Wound Description (Comments):    Removal Indications:    Dressing Appearance Moist drainage 09/30/22 1245   Drainage Amount Moderate 09/30/22 1245   Drainage Characteristics/Odor Serosanguineous 09/30/22 1245   Appearance Red;Hypergranulation;Slough;Moist 09/30/22 1245   Tissue loss description Full thickness 09/30/22 1245   Periwound Area Intact;Moist 09/30/22 1245   Wound Edges Undefined 09/30/22 1245   Wound Length (cm) 16 cm 09/30/22 1245   Wound Width (cm) 16 cm 09/30/22 1245   Wound Depth (cm) 0.2 cm 09/30/22 1245   Wound Volume (cm^3) 51.2 cm^3 09/30/22 1245   Wound Surface Area (cm^2) 256 cm^2 09/30/22 1245   Care Cleansed with:;Wound cleanser 09/30/22 1245   Dressing Applied;Other (comment) 09/30/22 1245   Dressing Change Due 10/01/22 09/30/22 1245            Altered Skin Integrity 05/13/22 1241 Right upper;posterior Arm #3 Other (comment) Full thickness tissue loss. Subcutaneous fat may be visible but bone, tendon or muscle are not exposed (Active)   05/13/22 1241   Altered Skin Integrity Present on Admission: yes   Side: Right   Orientation: upper;posterior   Location: Arm   Wound Number: #3   Is this injury device related?: No   Primary Wound Type: Other   Description of Altered Skin Integrity: Full thickness tissue loss. Subcutaneous fat may be visible but bone, tendon or muscle are not exposed   Ankle-Brachial Index:    Pulses:    Removal Indication and Assessment:    Wound Outcome:    (Retired) Wound Length (cm):    (Retired) Wound Width (cm):    (Retired) Depth (cm):    Wound Description (Comments):    Removal Indications:    Dressing Appearance Moist drainage 09/30/22 1245   Drainage Amount Moderate 09/30/22 1245   Drainage Characteristics/Odor Serosanguineous 09/30/22  1245   Appearance Slough;Moist;Hypergranulation;Red 09/30/22 1245   Tissue loss description Full thickness 09/30/22 1245   Periwound Area Intact;Moist 09/30/22 1245   Wound Edges Undefined 09/30/22 1245   Wound Length (cm) 1.6 cm 09/30/22 1245   Wound Width (cm) 2 cm 09/30/22 1245   Wound Depth (cm) 0.2 cm 09/30/22 1245   Wound Volume (cm^3) 0.64 cm^3 09/30/22 1245   Wound Surface Area (cm^2) 3.2 cm^2 09/30/22 1245   Care Cleansed with:;Wound cleanser 09/30/22 1245   Dressing Applied;Other (comment) 09/30/22 1245   Dressing Change Due 10/01/22 09/30/22 1245            Altered Skin Integrity 05/13/22 1247 Right posterior Thigh #4 Other (comment) Full thickness tissue loss. Subcutaneous fat may be visible but bone, tendon or muscle are not exposed (Active)   05/13/22 1247   Altered Skin Integrity Present on Admission: yes   Side: Right   Orientation: posterior   Location: Thigh   Wound Number: #4   Is this injury device related?: No   Primary Wound Type: Other   Description of Altered Skin Integrity: Full thickness tissue loss. Subcutaneous fat may be visible but bone, tendon or muscle are not exposed   Ankle-Brachial Index:    Pulses:    Removal Indication and Assessment:    Wound Outcome:    (Retired) Wound Length (cm):    (Retired) Wound Width (cm):    (Retired) Depth (cm):    Wound Description (Comments):    Removal Indications:    Dressing Appearance Moist drainage 09/30/22 1245   Drainage Amount Moderate 09/30/22 1245   Drainage Characteristics/Odor Serosanguineous 09/30/22 1245   Appearance Slough;Moist;Hypergranulation;Red 09/30/22 1245   Tissue loss description Full thickness 09/30/22 1245   Periwound Area Intact;Moist 09/30/22 1245   Wound Edges Undefined 09/30/22 1245   Wound Length (cm) 15 cm 09/30/22 1245   Wound Width (cm) 23.5 cm 09/30/22 1245   Wound Depth (cm) 0.2 cm 09/30/22 1245   Wound Volume (cm^3) 70.5 cm^3 09/30/22 1245   Wound Surface Area (cm^2) 352.5 cm^2 09/30/22 1245   Care Cleansed  with:;Wound cleanser 09/30/22 1245   Dressing Applied;Other (comment) 09/30/22 1245   Dressing Change Due 10/01/22 09/30/22 1245            Altered Skin Integrity 05/13/22 0107 Sacral spine #6 Other (comment) Full thickness tissue loss with exposed bone, tendon, or muscle. Often includes undermining and tunneling. May extend into muscle and/or supporting structures. (Active)   05/13/22 0107   Altered Skin Integrity Present on Admission: yes   Side:    Orientation:    Location: Sacral spine   Wound Number: #6   Is this injury device related?: No   Primary Wound Type: Other   Description of Altered Skin Integrity: Full thickness tissue loss with exposed bone, tendon, or muscle. Often includes undermining and tunneling. May extend into muscle and/or supporting structures.   Ankle-Brachial Index:    Pulses:    Removal Indication and Assessment:    Wound Outcome:    (Retired) Wound Length (cm):    (Retired) Wound Width (cm):    (Retired) Depth (cm):    Wound Description (Comments):    Removal Indications:    Dressing Appearance Moist drainage 09/30/22 1245   Drainage Amount Moderate 09/30/22 1245   Drainage Characteristics/Odor Serosanguineous 09/30/22 1245   Appearance Slough;Moist;Hypergranulation;Red 09/30/22 1245   Tissue loss description Full thickness 09/30/22 1245   Periwound Area Intact;Moist 09/30/22 1245   Wound Edges Undefined 09/30/22 1245   Wound Length (cm) 14 cm 09/30/22 1245   Wound Width (cm) 7 cm 09/30/22 1245   Wound Depth (cm) 0.2 cm 09/30/22 1245   Wound Volume (cm^3) 19.6 cm^3 09/30/22 1245   Wound Surface Area (cm^2) 98 cm^2 09/30/22 1245   Care Cleansed with:;Wound cleanser 09/30/22 1245   Dressing Applied;Other (comment) 09/30/22 1245   Dressing Change Due 10/01/22 09/30/22 1245         Assessment:         ICD-10-CM ICD-9-CM   1. Pressure injury of right leg, stage 4  L89.894 707.09     707.24   2. Pressure injury of sacral region, stage 4  L89.154 707.03     707.24   3. Pressure injury of left  leg, stage 4  L89.894 707.09     707.24   4. Pressure injury of right upper back, stage 4  L89.114 707.02     707.24         Procedures:   Excisional debridement performed:  [] Yes [x] No   Selective debridement performed:  [] Yes [x] No   Mechanical debridement performed:  [] Yes [x] No   Silver nitrate applied :    [x] Yes [] No   Labs ordered this visit:   [] Yes [x] No   Imaging ordered this visit:   [] Yes [x] No   Tissue pathology and/or culture taken:  [] Yes [x] No     Procedures:  HOME HEALTH AGENCY:  none  TIMES PER WEEK/DAYS:  ORDERS:  Right lower leg wound: Cleanse with wound cleanser, apply xeroform, 6x6 gentle foam border to be changed daily  Back wound: Cleanse with wound cleanser, apply xeroform, ABD pad, tape to be changed daily  Right posterior upper arm wound: Cleanse with wound cleanser, apply xeroform, 6x6 gentle foam border to be changed daily  Right posterior thigh wound: Cleanse with wound cleanser, apply xeroform, ABD pad, tape to be changed daily  Sacral wound: Cleanse with wound cleanser, apply xeroform, ABD pad, tape to be changed daily    Patient Orders:   Hydrocodone 10 mg, number 63                 Follow up in 1 month (on 10/30/2022) for pressure injuries .

## 2022-10-28 RX ORDER — HYDROCODONE BITARTRATE AND ACETAMINOPHEN 10; 325 MG/1; MG/1
1 TABLET ORAL EVERY 8 HOURS PRN
Qty: 21 TABLET | Refills: 0 | Status: SHIPPED | OUTPATIENT
Start: 2022-10-28 | End: 2022-11-04 | Stop reason: SDUPTHER

## 2022-11-04 ENCOUNTER — HOSPITAL ENCOUNTER (OUTPATIENT)
Dept: WOUND CARE | Facility: HOSPITAL | Age: 47
Discharge: HOME | End: 2022-11-04
Attending: FAMILY MEDICINE
Payer: MEDICARE

## 2022-11-04 VITALS
HEART RATE: 70 BPM | WEIGHT: 175 LBS | DIASTOLIC BLOOD PRESSURE: 78 MMHG | HEIGHT: 73 IN | SYSTOLIC BLOOD PRESSURE: 120 MMHG | RESPIRATION RATE: 18 BRPM | BODY MASS INDEX: 23.19 KG/M2

## 2022-11-04 DIAGNOSIS — L89.154 PRESSURE INJURY OF SACRAL REGION, STAGE 4: ICD-10-CM

## 2022-11-04 DIAGNOSIS — L89.114 PRESSURE INJURY OF RIGHT UPPER BACK, STAGE 4: ICD-10-CM

## 2022-11-04 DIAGNOSIS — L89.894 PRESSURE INJURY OF LEFT LEG, STAGE 4: ICD-10-CM

## 2022-11-04 DIAGNOSIS — L89.894 PRESSURE INJURY OF RIGHT LEG, STAGE 4: Primary | ICD-10-CM

## 2022-11-04 PROCEDURE — 27000999 HC MEDICAL RECORD PHOTO DOCUMENTATION

## 2022-11-04 PROCEDURE — 17250 CHEM CAUT OF GRANLTJ TISSUE: CPT

## 2022-11-04 PROCEDURE — 97597 DBRDMT OPN WND 1ST 20 CM/<: CPT

## 2022-11-04 PROCEDURE — 99214 OFFICE O/P EST MOD 30 MIN: CPT | Mod: 25

## 2022-11-04 RX ORDER — HYDROCODONE BITARTRATE AND ACETAMINOPHEN 10; 325 MG/1; MG/1
1 TABLET ORAL EVERY 8 HOURS PRN
Qty: 63 TABLET | Refills: 0 | Status: SHIPPED | OUTPATIENT
Start: 2022-11-04 | End: 2022-11-25

## 2022-11-04 NOTE — PROCEDURES
"Debridement    Date/Time: 11/4/2022 11:11 AM  Performed by: Modesto Gonzalez MD  Authorized by: Modesto Gonzalez MD     Time out: Immediately prior to procedure a "time out" was called to verify the correct patient, procedure, equipment, support staff and site/side marked as required.    Consent Done?:  Yes (Verbal)  Local anesthesia used?: No      Wound Details:    Location:  Sacrum    Type of Debridement:  Non-excisional       Length (cm):  21       Area (sq cm):  294       Width (cm):  14       Percent Debrided (%):  10       Depth (cm):  0.2       Total Area Debrided (sq cm):  29.4    Depth of debridement:  Epidermis/Dermis    Tissue debrided:  Epidermis    Devitalized tissue debrided:  Necrotic/Eschar    Instruments:  Forceps and Scissors    Bleeding:  Minimal  Hemostasis Achieved: Yes    Method Used:  Silver Nitrate  Patient tolerance:  Patient tolerated the procedure well with no immediate complications  "

## 2022-11-04 NOTE — PROGRESS NOTES
"   Subjective:       Patient ID: Modesto Payton is a 47 y.o. male.    Chief Complaint: Pressure Ulcer (Right lower leg - Stage 4/Back - Stage 4/Right posterior arm - Stage 4/Right posterior thigh - Stage 4/Sacrum - Stage 4)    47-year-old  male, a quadriplegia, is here for follow up of his posterior pressure injuries, all stage IV.  They have deteriorated somewhat.  He has been trying to lay on a regular mattress at home.  He has been getting off of his low air flow mattress.  We have had a discussion with him about this.    Review of Systems   Constitutional: Negative.    Respiratory: Negative.     Cardiovascular: Negative.    Skin:         As documented in the HPI.   All other systems reviewed and are negative.      Objective:      Vitals:    11/04/22 1127   BP: 120/78   BP Location: Left arm   Patient Position: Lying   Pulse: 70   Resp: 18   Weight: 79.4 kg (175 lb)   Height: 6' 1" (1.854 m)        Physical Exam  Vitals and nursing note reviewed.   Constitutional:       Appearance: Normal appearance.   Cardiovascular:      Rate and Rhythm: Normal rate.   Pulmonary:      Effort: Pulmonary effort is normal.   Skin:     General: Skin is warm and dry.      Comments: The pressure injuries on his posterior side have deteriorated slightly.  There was 1 pressure ulcer that needed debridement, some necrotic skin, on his sacral ulcer.  I used forceps and scissors.  He tolerated this well, with minimal bleeding.  The rest of the pressure injuries were treated with silver nitrate, for hypergranulation only.   Neurological:      Mental Status: He is alert.     Right anterior lower leg    Sacrum    Right posterior thigh    Right upper posterior arm    Back    Right lower leg       Altered Skin Integrity 05/13/22 1232 Right lower Leg #1 Other (comment) Full thickness tissue loss. Subcutaneous fat may be visible but bone, tendon or muscle are not exposed (Active)   05/13/22 1232   Altered Skin Integrity Present " on Admission: yes   Side: Right   Orientation: lower   Location: Leg   Wound Number: #1   Is this injury device related?: No   Primary Wound Type: Other   Description of Altered Skin Integrity: Full thickness tissue loss. Subcutaneous fat may be visible but bone, tendon or muscle are not exposed   Ankle-Brachial Index:    Pulses:    Removal Indication and Assessment:    Wound Outcome:    (Retired) Wound Length (cm):    (Retired) Wound Width (cm):    (Retired) Depth (cm):    Wound Description (Comments):    Removal Indications:    Dressing Appearance Moist drainage 11/04/22 1203   Drainage Amount Moderate 11/04/22 1203   Drainage Characteristics/Odor Serosanguineous 11/04/22 1203   Appearance Red;Moist 11/04/22 1203   Tissue loss description Full thickness 11/04/22 1203   Periwound Area Intact 11/04/22 1203   Wound Edges Open 11/04/22 1203   Wound Length (cm) 3 cm 11/04/22 1203   Wound Width (cm) 2 cm 11/04/22 1203   Wound Depth (cm) 0.2 cm 11/04/22 1203   Wound Volume (cm^3) 1.2 cm^3 11/04/22 1203   Wound Surface Area (cm^2) 6 cm^2 11/04/22 1203   Care Cleansed with:;Wound cleanser 11/04/22 1203   Dressing Applied 11/04/22 1203   Dressing Change Due 11/05/22 11/04/22 1203            Altered Skin Integrity 05/13/22 1237 Back #2 Other (comment) Full thickness tissue loss with exposed bone, tendon, or muscle. Often includes undermining and tunneling. May extend into muscle and/or supporting structures. (Active)   05/13/22 1237   Altered Skin Integrity Present on Admission: yes   Side:    Orientation:    Location: Back   Wound Number: #2   Is this injury device related?: No   Primary Wound Type: Other   Description of Altered Skin Integrity: Full thickness tissue loss with exposed bone, tendon, or muscle. Often includes undermining and tunneling. May extend into muscle and/or supporting structures.   Ankle-Brachial Index:    Pulses:    Removal Indication and Assessment:    Wound Outcome:    (Retired) Wound Length (cm):     (Retired) Wound Width (cm):    (Retired) Depth (cm):    Wound Description (Comments):    Removal Indications:    Dressing Appearance Moist drainage 11/04/22 1203   Drainage Amount Moderate 11/04/22 1203   Drainage Characteristics/Odor Serosanguineous;Green 11/04/22 1203   Appearance Slough;Red;Moist 11/04/22 1203   Tissue loss description Full thickness 11/04/22 1203   Periwound Area Intact 11/04/22 1203   Wound Edges Open 11/04/22 1203   Wound Length (cm) 21 cm 11/04/22 1203   Wound Width (cm) 13 cm 11/04/22 1203   Wound Depth (cm) 0.2 cm 11/04/22 1203   Wound Volume (cm^3) 54.6 cm^3 11/04/22 1203   Wound Surface Area (cm^2) 273 cm^2 11/04/22 1203   Care Cleansed with:;Wound cleanser 11/04/22 1203   Dressing Applied 11/04/22 1203   Dressing Change Due 11/05/22 11/04/22 1203            Altered Skin Integrity 05/13/22 1241 Right upper;posterior Arm #3 Other (comment) Full thickness tissue loss. Subcutaneous fat may be visible but bone, tendon or muscle are not exposed (Active)   05/13/22 1241   Altered Skin Integrity Present on Admission: yes   Side: Right   Orientation: upper;posterior   Location: Arm   Wound Number: #3   Is this injury device related?: No   Primary Wound Type: Other   Description of Altered Skin Integrity: Full thickness tissue loss. Subcutaneous fat may be visible but bone, tendon or muscle are not exposed   Ankle-Brachial Index:    Pulses:    Removal Indication and Assessment:    Wound Outcome:    (Retired) Wound Length (cm):    (Retired) Wound Width (cm):    (Retired) Depth (cm):    Wound Description (Comments):    Removal Indications:    Dressing Appearance Moist drainage 11/04/22 1203   Drainage Amount Moderate 11/04/22 1203   Drainage Characteristics/Odor Green;Serosanguineous 11/04/22 1203   Appearance Red;Moist;Slough 11/04/22 1203   Tissue loss description Full thickness 11/04/22 1203   Periwound Area Intact 11/04/22 1203   Wound Edges Open 11/04/22 1203   Wound Length (cm) 5 cm 11/04/22  1203   Wound Width (cm) 4.5 cm 11/04/22 1203   Wound Depth (cm) 0.2 cm 11/04/22 1203   Wound Volume (cm^3) 4.5 cm^3 11/04/22 1203   Wound Surface Area (cm^2) 22.5 cm^2 11/04/22 1203   Care Cleansed with:;Wound cleanser 11/04/22 1203   Dressing Applied 11/04/22 1203   Dressing Change Due 11/05/22 11/04/22 1203            Altered Skin Integrity 05/13/22 1247 Right posterior Thigh #4 Other (comment) Full thickness tissue loss. Subcutaneous fat may be visible but bone, tendon or muscle are not exposed (Active)   05/13/22 1247   Altered Skin Integrity Present on Admission: yes   Side: Right   Orientation: posterior   Location: Thigh   Wound Number: #4   Is this injury device related?: No   Primary Wound Type: Other   Description of Altered Skin Integrity: Full thickness tissue loss. Subcutaneous fat may be visible but bone, tendon or muscle are not exposed   Ankle-Brachial Index:    Pulses:    Removal Indication and Assessment:    Wound Outcome:    (Retired) Wound Length (cm):    (Retired) Wound Width (cm):    (Retired) Depth (cm):    Wound Description (Comments):    Removal Indications:    Dressing Appearance Moist drainage 11/04/22 1203   Drainage Amount Moderate 11/04/22 1203   Drainage Characteristics/Odor Serosanguineous 11/04/22 1203   Appearance Red;Moist 11/04/22 1203   Tissue loss description Full thickness 11/04/22 1203   Periwound Area Intact 11/04/22 1203   Wound Edges Open 11/04/22 1203   Wound Length (cm) 23 cm 11/04/22 1203   Wound Width (cm) 12 cm 11/04/22 1203   Wound Depth (cm) 0.2 cm 11/04/22 1203   Wound Volume (cm^3) 55.2 cm^3 11/04/22 1203   Wound Surface Area (cm^2) 276 cm^2 11/04/22 1203   Care Cleansed with:;Wound cleanser 11/04/22 1203   Dressing Applied 11/04/22 1203   Dressing Change Due 11/05/22 11/04/22 1203            Altered Skin Integrity 05/13/22 0107 Sacral spine #6 Other (comment) Full thickness tissue loss with exposed bone, tendon, or muscle. Often includes undermining and tunneling.  May extend into muscle and/or supporting structures. (Active)   05/13/22 0107   Altered Skin Integrity Present on Admission: yes   Side:    Orientation:    Location: Sacral spine   Wound Number: #6   Is this injury device related?: No   Primary Wound Type: Other   Description of Altered Skin Integrity: Full thickness tissue loss with exposed bone, tendon, or muscle. Often includes undermining and tunneling. May extend into muscle and/or supporting structures.   Ankle-Brachial Index:    Pulses:    Removal Indication and Assessment:    Wound Outcome:    (Retired) Wound Length (cm):    (Retired) Wound Width (cm):    (Retired) Depth (cm):    Wound Description (Comments):    Removal Indications:    Dressing Appearance Moist drainage 11/04/22 1203   Drainage Amount Moderate 11/04/22 1203   Drainage Characteristics/Odor Serosanguineous 11/04/22 1203   Appearance Red;Moist;Slough 11/04/22 1203   Tissue loss description Full thickness 11/04/22 1203   Periwound Area Intact 11/04/22 1203   Wound Edges Open 11/04/22 1203   Wound Length (cm) 21 cm 11/04/22 1203   Wound Width (cm) 14 cm 11/04/22 1203   Wound Depth (cm) 0.2 cm 11/04/22 1203   Wound Volume (cm^3) 58.8 cm^3 11/04/22 1203   Wound Surface Area (cm^2) 294 cm^2 11/04/22 1203   Care Cleansed with:;Wound cleanser 11/04/22 1203   Dressing Applied 11/04/22 1203   Dressing Change Due 11/05/22 11/04/22 1203            Altered Skin Integrity 11/04/22 1203 Left anterior;lower Leg #5 Other (comment) (Active)   11/04/22 1203   Altered Skin Integrity Present on Admission: yes   Side: Left   Orientation: anterior;lower   Location: Leg   Wound Number: #5   Is this injury device related?: No   Primary Wound Type: Other   Description of Altered Skin Integrity:    Ankle-Brachial Index:    Pulses:    Removal Indication and Assessment:    Wound Outcome:    (Retired) Wound Length (cm):    (Retired) Wound Width (cm):    (Retired) Depth (cm):    Wound Description (Comments):    Removal  "Indications:    Dressing Appearance Moist drainage 11/04/22 1203   Drainage Amount Moderate 11/04/22 1203   Drainage Characteristics/Odor Serosanguineous 11/04/22 1203   Appearance Pink;Moist 11/04/22 1203   Tissue loss description Full thickness 11/04/22 1203   Periwound Area Intact 11/04/22 1203   Wound Edges Open 11/04/22 1203   Wound Length (cm) 4 cm 11/04/22 1203   Wound Width (cm) 3 cm 11/04/22 1203   Wound Depth (cm) 0.2 cm 11/04/22 1203   Wound Volume (cm^3) 2.4 cm^3 11/04/22 1203   Wound Surface Area (cm^2) 12 cm^2 11/04/22 1203   Care Cleansed with:;Wound cleanser 11/04/22 1203   Dressing Applied 11/04/22 1203   Dressing Change Due 11/05/22 11/04/22 1203   Debridement     Date/Time: 11/4/2022 11:11 AM  Performed by: Modesto Gonzalez MD  Authorized by: Modesto Gonzalez MD      Time out: Immediately prior to procedure a "time out" was called to verify the correct patient, procedure, equipment, support staff and site/side marked as required.     Consent Done?:  Yes (Verbal)  Local anesthesia used?: No       Wound Details:    Location:  Sacrum    Type of Debridement:  Non-excisional       Length (cm):  21       Area (sq cm):  294       Width (cm):  14       Percent Debrided (%):  10       Depth (cm):  0.2       Total Area Debrided (sq cm):  29.4    Depth of debridement:  Epidermis/Dermis    Tissue debrided:  Epidermis    Devitalized tissue debrided:  Necrotic/Eschar    Instruments:  Forceps and Scissors     Bleeding:  Minimal  Hemostasis Achieved: Yes    Method Used:  Silver Nitrate  Patient tolerance:  Patient tolerated the procedure well with no immediate complications      Assessment:         ICD-10-CM ICD-9-CM   1. Pressure injury of right leg, stage 4  L89.894 707.09     707.24   2. Pressure injury of sacral region, stage 4  L89.154 707.03     707.24   3. Pressure injury of left leg, stage 4  L89.894 707.09     707.24   4. Pressure injury of right upper back, stage 4  L89.114 707.02     707.24       "   Procedures:   Excisional debridement performed:  [] Yes [x] No   Selective debridement performed:  [x] Yes [] No   Mechanical debridement performed:  [] Yes [x] No   Silver nitrate applied :    [x] Yes [] No   Labs ordered this visit:   [] Yes [x] No   Imaging ordered this visit:   [] Yes [x] No   Tissue pathology and/or culture taken:  [] Yes [x] No     Procedures:  HOME HEALTH AGENCY: none  TIMES PER WEEK/DAYS:  ORDERS:  Right lower leg wound: Cleanse with wound cleanser, apply xeroform, 6x6 gentle foam border to be changed daily  Back wound: Cleanse with wound cleanser, apply xeroform, ABD pad, tape to be changed daily  Right posterior upper arm wound: Cleanse with wound cleanser, apply xeroform, 6x6 gentle foam border to be changed daily  Right posterior thigh wound: Cleanse with wound cleanser, apply xeroform, ABD pad, tape to be changed daily  Sacral wound: Cleanse with wound cleanser, apply xeroform, ABD pad, tape to be changed daily  Left anterior lower leg wound: Cleanse with wound cleanser, apply xeroform, gentle foam border to be changed daily    Patient Orders:    Hydrocodone 10 mg, number 63             Follow up in about 1 month (around 12/4/2022) for Multiple PU 's.

## 2022-11-09 ENCOUNTER — OFFICE VISIT (OUTPATIENT)
Dept: FAMILY MEDICINE | Facility: CLINIC | Age: 47
End: 2022-11-09
Payer: MEDICARE

## 2022-11-09 VITALS
RESPIRATION RATE: 18 BRPM | HEIGHT: 73 IN | SYSTOLIC BLOOD PRESSURE: 138 MMHG | BODY MASS INDEX: 21.87 KG/M2 | HEART RATE: 87 BPM | WEIGHT: 165 LBS | DIASTOLIC BLOOD PRESSURE: 60 MMHG | TEMPERATURE: 98 F | OXYGEN SATURATION: 100 %

## 2022-11-09 DIAGNOSIS — M62.838 MUSCLE SPASM: ICD-10-CM

## 2022-11-09 DIAGNOSIS — Z11.59 ENCOUNTER FOR HEPATITIS C SCREENING TEST FOR LOW RISK PATIENT: ICD-10-CM

## 2022-11-09 DIAGNOSIS — L89.90 PRESSURE ULCERS OF SKIN OF MULTIPLE TOPOGRAPHIC SITES: Primary | ICD-10-CM

## 2022-11-09 DIAGNOSIS — Z12.11 SCREENING FOR COLON CANCER: ICD-10-CM

## 2022-11-09 DIAGNOSIS — R05.3 CHRONIC COUGH: ICD-10-CM

## 2022-11-09 DIAGNOSIS — G47.00 INSOMNIA, UNSPECIFIED TYPE: ICD-10-CM

## 2022-11-09 DIAGNOSIS — Z11.4 SCREENING FOR HIV WITHOUT PRESENCE OF RISK FACTORS: ICD-10-CM

## 2022-11-09 DIAGNOSIS — I73.9 PERIPHERAL VASCULAR DISEASE, UNSPECIFIED: ICD-10-CM

## 2022-11-09 DIAGNOSIS — E55.9 VITAMIN D DEFICIENCY: ICD-10-CM

## 2022-11-09 DIAGNOSIS — D64.9 ANEMIA, UNSPECIFIED TYPE: ICD-10-CM

## 2022-11-09 PROCEDURE — 99214 OFFICE O/P EST MOD 30 MIN: CPT | Mod: ,,, | Performed by: FAMILY MEDICINE

## 2022-11-09 PROCEDURE — 99214 PR OFFICE/OUTPT VISIT, EST, LEVL IV, 30-39 MIN: ICD-10-PCS | Mod: ,,, | Performed by: FAMILY MEDICINE

## 2022-11-09 RX ORDER — FERROUS GLUCONATE 324(38)MG
324 TABLET ORAL
Qty: 90 TABLET | Refills: 3 | Status: SHIPPED | OUTPATIENT
Start: 2022-11-09 | End: 2023-01-01 | Stop reason: SDUPTHER

## 2022-11-09 RX ORDER — PROMETHAZINE HYDROCHLORIDE 6.25 MG/5ML
6.25 SYRUP ORAL EVERY 6 HOURS PRN
Qty: 473 ML | Refills: 3 | Status: SHIPPED | OUTPATIENT
Start: 2022-11-09 | End: 2023-01-01 | Stop reason: SDUPTHER

## 2022-11-09 RX ORDER — ZOLPIDEM TARTRATE 10 MG/1
10 TABLET ORAL NIGHTLY PRN
Qty: 90 TABLET | Refills: 1 | Status: SHIPPED | OUTPATIENT
Start: 2022-11-09 | End: 2023-01-01 | Stop reason: SDUPTHER

## 2022-11-09 RX ORDER — TRAZODONE HYDROCHLORIDE 50 MG/1
50 TABLET ORAL NIGHTLY PRN
Qty: 90 TABLET | Refills: 1 | Status: SHIPPED | OUTPATIENT
Start: 2022-11-09 | End: 2022-11-16 | Stop reason: SDUPTHER

## 2022-11-09 RX ORDER — BACLOFEN 10 MG/1
10 TABLET ORAL 3 TIMES DAILY
Qty: 270 TABLET | Refills: 1 | Status: SHIPPED | OUTPATIENT
Start: 2022-11-09 | End: 2023-01-01

## 2022-11-09 RX ORDER — MINOCYCLINE HYDROCHLORIDE 100 MG/1
100 CAPSULE ORAL DAILY
Qty: 90 CAPSULE | Refills: 1 | Status: SHIPPED | OUTPATIENT
Start: 2022-11-09 | End: 2022-11-10 | Stop reason: SDUPTHER

## 2022-11-09 NOTE — PROGRESS NOTES
Patient ID: [id number]     Chief Complaint: Insomnia        HPI:     Modesto Payton is a 47 y.o. male here today for a follow up.  - He is also compliant with followup with wound care (Dr. Modesto Gonzalez in [place]) to treat decubitus/pressure ulcers, reports wounds are healing well, he attends wound care every other week, he needs a refill for Rx Minocycline today.  - Insomnia is controlled with Rx, no side effects, he needs Rx refill of Trazodone and Ambien today.   - He also reports that muscle spasms are controlled with Rx, no side effects, he needs Rx refill of Baclofen today.   - He has anemia, controlled with oral iron, no side effects, due for repeat labs today, he needs Rx refill of Fergon.    - He has Vit D def, asymptomatic, due for repeat labs.   - He has a slight chronic cough, typically resolves with Promethazine, needs refill today, he cannot take the promethazine DM due to GI upset, he denies SOB, wheezing, or hemoptysis.  - He would like HIV and Hep C screening.  - He is due for PSA screening today, but his insurance will not pay for the PSA.    - He needs Cologuard testing.  - PVD is stable and asymptomatic.   - Patient is without any other complaints today.       ----------------------------  Anemia  Chronic constipation  Kidney disease  Osteomyelitis  Quadriplegia  Renal disease     Past Surgical History:   Procedure Laterality Date    FLAP PROCEDURE      for PU     multiple surgicla debridements      NECK SURGERY      spinal fusion        Review of patient's allergies indicates:   Allergen Reactions    Iodine     Penicillins     Sulfur        Outpatient Medications Marked as Taking for the 11/9/22 encounter (Office Visit) with Quita Boogie MD   Medication Sig Dispense Refill    HYDROcodone-acetaminophen (NORCO)  mg per tablet Take 1 tablet by mouth every 8 (eight) hours as needed for Pain. 63 tablet 0    [DISCONTINUED] baclofen (LIORESAL) 10 MG tablet Take 1 tablet (10 mg total)  by mouth 3 (three) times daily. 90 tablet 2    [DISCONTINUED] ferrous gluconate (FERGON) 324 MG tablet Take 1 tablet (324 mg total) by mouth daily with breakfast. 30 tablet 2    [DISCONTINUED] minocycline (MINOCIN,DYNACIN) 100 MG capsule Take 1 capsule (100 mg total) by mouth once daily. 30 capsule 2    [DISCONTINUED] promethazine (PHENERGAN) 6.25 mg/5 mL syrup Take 5 mLs (6.25 mg total) by mouth every 6 (six) hours as needed for Nausea. 473 mL 3    [DISCONTINUED] traZODone (DESYREL) 50 MG tablet Take 1 tablet (50 mg total) by mouth nightly as needed for Insomnia. 30 tablet 2    [DISCONTINUED] zolpidem (AMBIEN) 10 mg Tab Take 1 tablet (10 mg total) by mouth nightly as needed (insomnia). 30 tablet 5       Social History     Socioeconomic History    Marital status: Unknown   Tobacco Use    Smoking status: Never    Smokeless tobacco: Never   Substance and Sexual Activity    Alcohol use: Never    Drug use: Not Currently        Family History   Family history unknown: Yes        Subjective:       Review of Systems:    See HPI for details    Constitutional: No fever, No chills, No fatigue.   Eye: No blurring, No visual disturbances.   Ear/Nose/Mouth/Throat: No decreased hearing, No ear pain, No nasal congestion, No sore throat.   Respiratory: No shortness of breath, Cough, No wheezing.   Cardiovascular: No chest pain, No palpitations, No peripheral edema.   Gastrointestinal: No nausea, No vomiting, No diarrhea, No constipation, No abdominal pain.   Genitourinary: No dysuria, No hematuria.   Hematology/Lymphatics: No bruising tendency, No bleeding tendency, No swollen lymph glands.   Endocrine: No excessive thirst, No polyuria, No excessive hunger.   Musculoskeletal: No joint pain, No muscle pain.   Integumentary: No rash, No pruritus.   Neurologic: No confusion, No headache.   Psychiatric: Sleeping problems, No anxiety, No depression, Not suicidal, No hallucinations.     All Other ROS: Negative except as stated in HPI.  "      Objective:     /60   Pulse 87   Temp 98.1 °F (36.7 °C) (Oral)   Resp 18   Ht 6' 1" (1.854 m)   Wt 74.8 kg (165 lb)   SpO2 100%   BMI 21.77 kg/m²     Physical Exam    General: Alert and oriented, No acute distress.   Eye: Pupils are equal, round and reactive to light, Normal conjunctiva.   HENT: Normocephalic, Tympanic membranes are clear, Normal hearing, Oral mucosa is moist, No pharyngeal erythema.   Throat: Pharynx ( Not edematous, No exudate ).   Neck: Supple, Non-tender, No carotid bruit, No lymphadenopathy, No thyromegaly.   Respiratory: Lungs are clear to auscultation, Respirations are non-labored, Breath sounds are equal, Symmetrical chest wall expansion, No chest wall tenderness.   Cardiovascular: Normal rate, Regular rhythm, No murmur, Good pulses equal in all extremities, No edema.   Gastrointestinal: Soft, Non-tender, Non-distended, Normal bowel sounds, No organomegaly.   Musculoskeletal: Mobility/ gait: Unable to walk, quadriplegic.   Neurologic: No focal deficits, Cranial Nerves II-XII are grossly intact.   Psychiatric: Cooperative, Appropriate mood & affect, Normal judgment, Non-suicidal.   Mood and affect: Calm.   Behavior: Relaxed.         Assessment:       ICD-10-CM ICD-9-CM   1. Pressure ulcers of skin of multiple topographic sites  L89.90 707.00     707.20   2. Peripheral vascular disease, unspecified  I73.9 443.9   3. Insomnia, unspecified type  G47.00 780.52   4. Muscle spasm  M62.838 728.85   5. Anemia, unspecified type  D64.9 285.9   6. Chronic cough  R05.3 786.2   7. Screening for HIV without presence of risk factors  Z11.4 V73.89   8. Encounter for hepatitis C screening test for low risk patient  Z11.59 V73.89   9. Screening for colon cancer  Z12.11 V76.51   10. Vitamin D deficiency  E55.9 268.9        Plan:     Problem List Items Addressed This Visit          Cardiac/Vascular    Peripheral vascular disease, unspecified     Other Visit Diagnoses       Pressure ulcers of " skin of multiple topographic sites    -  Primary    Relevant Medications    minocycline (MINOCIN,DYNACIN) 100 MG capsule    Insomnia, unspecified type        Relevant Medications    traZODone (DESYREL) 50 MG tablet    zolpidem (AMBIEN) 10 mg Tab    Muscle spasm        Relevant Medications    baclofen (LIORESAL) 10 MG tablet    Anemia, unspecified type        Relevant Medications    ferrous gluconate (FERGON) 324 MG tablet    Other Relevant Orders    CBC Auto Differential    Iron and TIBC    Chronic cough        Relevant Medications    promethazine (PHENERGAN) 6.25 mg/5 mL syrup    Screening for HIV without presence of risk factors        Relevant Orders    HIV 1/2 Ag/Ab (4th Gen)    Encounter for hepatitis C screening test for low risk patient        Relevant Orders    Hepatitis C Antibody    Screening for colon cancer        Relevant Orders    Cologuard Screening (Multitarget Stool DNA)    Vitamin D deficiency        Relevant Orders    Vitamin D         1. Pressure ulcers of skin of multiple topographic sites  - minocycline (MINOCIN,DYNACIN) 100 MG capsule; Take 1 capsule (100 mg total) by mouth once daily.  Dispense: 90 capsule; Refill: 1  - Continue followup with wound care as scheduled. Rx Minocycline sent.     2. Peripheral vascular disease, unspecified  - Continue to monitor.     3. Insomnia, unspecified type  - traZODone (DESYREL) 50 MG tablet; Take 1 tablet (50 mg total) by mouth nightly as needed for Insomnia.  Dispense: 90 tablet; Refill: 1  - zolpidem (AMBIEN) 10 mg Tab; Take 1 tablet (10 mg total) by mouth nightly as needed (insomnia).  Dispense: 90 tablet; Refill: 1  - Continue Trazodone and Ambien p.r.n., Rx Trazodone and Ambien sent. Continue relaxation techniques and proper sleep hygiene. Will titrate medication as needed/tolerated. Notify M.D. or ER if symptoms persist or worsen, SI/HI, temp greater than 100.4, or any acute illness.     4. Muscle spasm  - baclofen (LIORESAL) 10 MG tablet; Take 1  tablet (10 mg total) by mouth 3 (three) times daily.  Dispense: 270 tablet; Refill: 1  - Rx Baclofen refilled today.     5. Anemia, unspecified type  - ferrous gluconate (FERGON) 324 MG tablet; Take 1 tablet (324 mg total) by mouth daily with breakfast.  Dispense: 90 tablet; Refill: 3  - CBC Auto Differential; Future  - Iron and TIBC; Future  - Rx Fergon refilled. Will treat pending results.     6. Chronic cough  - promethazine (PHENERGAN) 6.25 mg/5 mL syrup; Take 5 mLs (6.25 mg total) by mouth every 6 (six) hours as needed for Nausea.  Dispense: 473 mL; Refill: 3  - Rx Promethazine refilled. Increase fluid intake. Notify M.D. or Urgent Care/ER if symptoms persist or worsen, chest pain, SOB, wheezing, hemoptysis, temp greater than 100.4, or any acute illness.     7. Screening for HIV without presence of risk factors  - HIV 1/2 Ag/Ab (4th Gen); Future    8. Encounter for hepatitis C screening test for low risk patient  - Hepatitis C Antibody; Future    9. Screening for colon cancer  - Cologuard Screening (Multitarget Stool DNA); Future  - Cologuard Screening (Multitarget Stool DNA)    10. Vitamin D deficiency  - Vitamin D; Future        Modesto was seen today for insomnia.    Diagnoses and all orders for this visit:    Pressure ulcers of skin of multiple topographic sites  -     minocycline (MINOCIN,DYNACIN) 100 MG capsule; Take 1 capsule (100 mg total) by mouth once daily.    Peripheral vascular disease, unspecified    Insomnia, unspecified type  -     traZODone (DESYREL) 50 MG tablet; Take 1 tablet (50 mg total) by mouth nightly as needed for Insomnia.  -     zolpidem (AMBIEN) 10 mg Tab; Take 1 tablet (10 mg total) by mouth nightly as needed (insomnia).    Muscle spasm  -     baclofen (LIORESAL) 10 MG tablet; Take 1 tablet (10 mg total) by mouth 3 (three) times daily.    Anemia, unspecified type  -     ferrous gluconate (FERGON) 324 MG tablet; Take 1 tablet (324 mg total) by mouth daily with breakfast.  -     CBC  Auto Differential; Future  -     Iron and TIBC; Future    Chronic cough  -     promethazine (PHENERGAN) 6.25 mg/5 mL syrup; Take 5 mLs (6.25 mg total) by mouth every 6 (six) hours as needed for Nausea.    Screening for HIV without presence of risk factors  -     HIV 1/2 Ag/Ab (4th Gen); Future    Encounter for hepatitis C screening test for low risk patient  -     Hepatitis C Antibody; Future    Screening for colon cancer  -     Cologuard Screening (Multitarget Stool DNA); Future  -     Cologuard Screening (Multitarget Stool DNA)    Vitamin D deficiency  -     Vitamin D; Future        Medication List with Changes/Refills   Current Medications    HYDROCODONE-ACETAMINOPHEN (NORCO)  MG PER TABLET    Take 1 tablet by mouth every 8 (eight) hours as needed for Pain.       Start Date: 11/4/2022 End Date: 11/25/2022   Changed and/or Refilled Medications    Modified Medication Previous Medication    BACLOFEN (LIORESAL) 10 MG TABLET baclofen (LIORESAL) 10 MG tablet       Take 1 tablet (10 mg total) by mouth 3 (three) times daily.    Take 1 tablet (10 mg total) by mouth 3 (three) times daily.       Start Date: 11/9/2022 End Date: 5/8/2023    Start Date: 9/22/2022 End Date: 11/9/2022    FERROUS GLUCONATE (FERGON) 324 MG TABLET ferrous gluconate (FERGON) 324 MG tablet       Take 1 tablet (324 mg total) by mouth daily with breakfast.    Take 1 tablet (324 mg total) by mouth daily with breakfast.       Start Date: 11/9/2022 End Date: 11/9/2023    Start Date: 9/22/2022 End Date: 11/9/2022    MINOCYCLINE (MINOCIN,DYNACIN) 100 MG CAPSULE minocycline (MINOCIN,DYNACIN) 100 MG capsule       Take 1 capsule (100 mg total) by mouth once daily.    Take 1 capsule (100 mg total) by mouth once daily.       Start Date: 11/9/2022 End Date: 5/8/2023    Start Date: 9/22/2022 End Date: 11/9/2022    PROMETHAZINE (PHENERGAN) 6.25 MG/5 ML SYRUP promethazine (PHENERGAN) 6.25 mg/5 mL syrup       Take 5 mLs (6.25 mg total) by mouth every 6 (six)  hours as needed for Nausea.    Take 5 mLs (6.25 mg total) by mouth every 6 (six) hours as needed for Nausea.       Start Date: 11/9/2022 End Date: --    Start Date: 9/22/2022 End Date: 11/9/2022    TRAZODONE (DESYREL) 50 MG TABLET traZODone (DESYREL) 50 MG tablet       Take 1 tablet (50 mg total) by mouth nightly as needed for Insomnia.    Take 1 tablet (50 mg total) by mouth nightly as needed for Insomnia.       Start Date: 11/9/2022 End Date: 5/8/2023    Start Date: 9/22/2022 End Date: 11/9/2022    ZOLPIDEM (AMBIEN) 10 MG TAB zolpidem (AMBIEN) 10 mg Tab       Take 1 tablet (10 mg total) by mouth nightly as needed (insomnia).    Take 1 tablet (10 mg total) by mouth nightly as needed (insomnia).       Start Date: 11/9/2022 End Date: 5/8/2023    Start Date: 9/22/2022 End Date: 11/9/2022          Follow up in about 6 months (around 5/9/2023) for Wellness.

## 2022-11-10 DIAGNOSIS — L89.90 PRESSURE ULCERS OF SKIN OF MULTIPLE TOPOGRAPHIC SITES: ICD-10-CM

## 2022-11-10 RX ORDER — MINOCYCLINE HYDROCHLORIDE 100 MG/1
100 CAPSULE ORAL 2 TIMES DAILY
Qty: 180 CAPSULE | Refills: 3 | Status: ON HOLD | OUTPATIENT
Start: 2022-11-10 | End: 2023-01-27 | Stop reason: HOSPADM

## 2022-11-16 DIAGNOSIS — G47.00 INSOMNIA, UNSPECIFIED TYPE: ICD-10-CM

## 2022-11-16 RX ORDER — TRAZODONE HYDROCHLORIDE 50 MG/1
75 TABLET ORAL NIGHTLY PRN
Qty: 90 TABLET | Refills: 1 | Status: SHIPPED | OUTPATIENT
Start: 2022-11-16 | End: 2023-02-23 | Stop reason: SDUPTHER

## 2022-12-02 ENCOUNTER — HOSPITAL ENCOUNTER (OUTPATIENT)
Dept: WOUND CARE | Facility: HOSPITAL | Age: 47
Discharge: HOME | End: 2022-12-02
Attending: FAMILY MEDICINE
Payer: MEDICARE

## 2022-12-02 VITALS
HEART RATE: 80 BPM | WEIGHT: 165 LBS | RESPIRATION RATE: 18 BRPM | BODY MASS INDEX: 21.87 KG/M2 | DIASTOLIC BLOOD PRESSURE: 72 MMHG | HEIGHT: 73 IN | SYSTOLIC BLOOD PRESSURE: 124 MMHG

## 2022-12-02 DIAGNOSIS — L89.114 PRESSURE INJURY OF RIGHT UPPER BACK, STAGE 4: ICD-10-CM

## 2022-12-02 DIAGNOSIS — L89.894 PRESSURE INJURY OF RIGHT LEG, STAGE 4: Primary | ICD-10-CM

## 2022-12-02 DIAGNOSIS — L89.894 PRESSURE INJURY OF LEFT LEG, STAGE 4: ICD-10-CM

## 2022-12-02 DIAGNOSIS — L89.154 PRESSURE INJURY OF SACRAL REGION, STAGE 4: ICD-10-CM

## 2022-12-02 PROCEDURE — 27000999 HC MEDICAL RECORD PHOTO DOCUMENTATION

## 2022-12-02 PROCEDURE — 99213 OFFICE O/P EST LOW 20 MIN: CPT

## 2022-12-02 PROCEDURE — 17250 CHEM CAUT OF GRANLTJ TISSUE: CPT

## 2022-12-02 RX ORDER — HYDROCODONE BITARTRATE AND ACETAMINOPHEN 10; 325 MG/1; MG/1
1 TABLET ORAL EVERY 8 HOURS PRN
Qty: 63 TABLET | Refills: 0 | Status: SHIPPED | OUTPATIENT
Start: 2022-12-02 | End: 2022-12-23

## 2022-12-02 NOTE — PROGRESS NOTES
"   Subjective:       Patient ID: Modesto Payton is a 47 y.o. male.    Chief Complaint: No chief complaint on file.    47-year-old  male, a quadriplegia, who is here for follow up of his multiple pressure ulcers on the right upper extremity, back, and lower extremities.  Overall, his wounds have improved since his last visit.  There is no evidence of secondary infection at this time.  He routinely comes here for dressing changes every 3 weeks.    Review of Systems   Constitutional: Negative.    Respiratory: Negative.     Cardiovascular: Negative.    Skin:         As documented in the HPI.   All other systems reviewed and are negative.      Objective:      Vitals:    12/02/22 1310   BP: 124/72   BP Location: Left arm   Patient Position: Lying   Pulse: 80   Resp: 18   Weight: 74.8 kg (165 lb)   Height: 6' 1" (1.854 m)        Physical Exam  Vitals and nursing note reviewed. Exam conducted with a chaperone present.   Cardiovascular:      Rate and Rhythm: Normal rate.   Pulmonary:      Effort: Pulmonary effort is normal.   Skin:     General: Skin is warm and dry.      Comments: The wounds on his back have improved since his last visit.  They are measuring somewhat smaller.  I did chemical cauterization on all of his wounds, with silver nitrate.  There was no surgical debridements done.  Left posterior thigh 4 x 3 by 1.5 x 0.2 cm  Sacrum 17 x 15 x 0.2  Right posterior thigh 27 x 13 x 0.2 cm  Right posterior upper arm 2.5 x 2 x 0.2 cm  Right elbow 2 x 2 x 0.2 cm  Right lower leg 3 x 2 x 0.2 cm  Left lower leg 3.7 x 1.5 x 0.2 cm   Neurological:      Mental Status: He is alert.      Comments: Quadriplegia     Left posterior thigh    Sacrum    Right posterior thigh    Back    Right posterior Upper Arm    Right elbow    Right lower leg    Left lower leg         Assessment:         ICD-10-CM ICD-9-CM   1. Pressure injury of right leg, stage 4  L89.894 707.09     707.24   2. Pressure injury of sacral region, " stage 4  L89.154 707.03     707.24   3. Pressure injury of left leg, stage 4  L89.894 707.09     707.24   4. Pressure injury of right upper back, stage 4  L89.114 707.02     707.24         Procedures:   Excisional debridement performed:  [] Yes [x] No   Selective debridement performed:  [] Yes [x] No   Mechanical debridement performed:  [] Yes [x] No   Silver nitrate applied :    [] Yes [x] No   Labs ordered this visit:   [] Yes [x] No   Imaging ordered this visit:   [] Yes [x] No   Tissue pathology and/or culture taken:  [] Yes [x] No     Procedures:  HOME HEALTH AGENCY: none  TIMES PER WEEK/DAYS:  ORDERS:  Right lower leg wound: Cleanse with wound cleanser, apply xeroform, 6x6 gentle foam border to be changed daily  Back wound: Cleanse with wound cleanser, apply xeroform, ABD pad, tape to be changed daily  Right posterior upper arm wound: Cleanse with wound cleanser, apply xeroform, 6x6 gentle foam border to be changed daily  Right posterior thigh wound: Cleanse with wound cleanser, apply xeroform, ABD pad, tape to be changed daily  Sacral wound: Cleanse with wound cleanser, apply xeroform, ABD pad, tape to be changed daily  Left anterior lower leg wound: Cleanse with wound cleanser, apply xeroform, gentle foam border to be changed daily  Left posterior thigh wound: Cleanse with wound cleanser and apply xeroform to the wound bed, cover with a gentle foam border dressing, change daily.  Right elbow wound: Cleanse with wound cleanser and apply xeroform to the wound bed, cover with a gentle foam border dressing, change daily.     Patient Orders:            Hydrocodone 10 mg, #63.     Follow up in about 3 weeks (around 12/23/2022).

## 2022-12-30 RX ORDER — HYDROCODONE BITARTRATE AND ACETAMINOPHEN 10; 325 MG/1; MG/1
1 TABLET ORAL EVERY 8 HOURS PRN
Qty: 21 TABLET | Refills: 0 | Status: SHIPPED | OUTPATIENT
Start: 2022-12-30 | End: 2023-01-06

## 2023-01-01 ENCOUNTER — ANESTHESIA EVENT (OUTPATIENT)
Dept: SURGERY | Facility: HOSPITAL | Age: 48
DRG: 698 | End: 2023-01-01
Payer: MEDICARE

## 2023-01-01 ENCOUNTER — PATIENT MESSAGE (OUTPATIENT)
Dept: ADMINISTRATIVE | Facility: HOSPITAL | Age: 48
End: 2023-01-01
Payer: MEDICARE

## 2023-01-01 ENCOUNTER — OFFICE VISIT (OUTPATIENT)
Dept: FAMILY MEDICINE | Facility: CLINIC | Age: 48
End: 2023-01-01
Payer: MEDICARE

## 2023-01-01 ENCOUNTER — HOSPITAL ENCOUNTER (OUTPATIENT)
Dept: WOUND CARE | Facility: HOSPITAL | Age: 48
Discharge: HOME | End: 2023-05-26
Attending: FAMILY MEDICINE
Payer: MEDICARE

## 2023-01-01 ENCOUNTER — HOSPITAL ENCOUNTER (OUTPATIENT)
Dept: WOUND CARE | Facility: HOSPITAL | Age: 48
Discharge: HOME | End: 2023-07-07
Attending: FAMILY MEDICINE
Payer: MEDICARE

## 2023-01-01 ENCOUNTER — TELEPHONE (OUTPATIENT)
Dept: FAMILY MEDICINE | Facility: CLINIC | Age: 48
End: 2023-01-01
Payer: MEDICARE

## 2023-01-01 ENCOUNTER — HOSPITAL ENCOUNTER (OUTPATIENT)
Dept: WOUND CARE | Facility: HOSPITAL | Age: 48
Discharge: HOME | End: 2023-03-31
Attending: FAMILY MEDICINE
Payer: MEDICARE

## 2023-01-01 ENCOUNTER — LAB REQUISITION (OUTPATIENT)
Dept: LAB | Facility: HOSPITAL | Age: 48
End: 2023-01-01
Payer: MEDICARE

## 2023-01-01 ENCOUNTER — ANESTHESIA (OUTPATIENT)
Dept: SURGERY | Facility: HOSPITAL | Age: 48
DRG: 698 | End: 2023-01-01
Payer: MEDICARE

## 2023-01-01 ENCOUNTER — EXTERNAL HOME HEALTH (OUTPATIENT)
Dept: HOME HEALTH SERVICES | Facility: HOSPITAL | Age: 48
End: 2023-01-01
Payer: MEDICARE

## 2023-01-01 ENCOUNTER — PATIENT OUTREACH (OUTPATIENT)
Dept: ADMINISTRATIVE | Facility: CLINIC | Age: 48
End: 2023-01-01
Payer: MEDICARE

## 2023-01-01 ENCOUNTER — HOSPITAL ENCOUNTER (OUTPATIENT)
Dept: WOUND CARE | Facility: HOSPITAL | Age: 48
Discharge: HOME | End: 2023-11-03
Attending: FAMILY MEDICINE
Payer: MEDICARE

## 2023-01-01 ENCOUNTER — PATIENT OUTREACH (OUTPATIENT)
Dept: HOME HEALTH SERVICES | Facility: HOSPITAL | Age: 48
End: 2023-01-01
Payer: MEDICARE

## 2023-01-01 ENCOUNTER — DOCUMENTATION ONLY (OUTPATIENT)
Dept: FAMILY MEDICINE | Facility: CLINIC | Age: 48
End: 2023-01-01
Payer: MEDICARE

## 2023-01-01 ENCOUNTER — HOSPITAL ENCOUNTER (OUTPATIENT)
Dept: WOUND CARE | Facility: HOSPITAL | Age: 48
Discharge: HOME | End: 2023-12-08
Attending: FAMILY MEDICINE
Payer: MEDICARE

## 2023-01-01 ENCOUNTER — HOSPITAL ENCOUNTER (INPATIENT)
Facility: HOSPITAL | Age: 48
LOS: 3 days | Discharge: HOME HEALTH | DRG: 698 | End: 2023-03-20
Attending: EMERGENCY MEDICINE | Admitting: INTERNAL MEDICINE
Payer: MEDICARE

## 2023-01-01 ENCOUNTER — HOSPITAL ENCOUNTER (OUTPATIENT)
Dept: WOUND CARE | Facility: HOSPITAL | Age: 48
Discharge: HOME | End: 2023-09-22
Attending: FAMILY MEDICINE
Payer: MEDICARE

## 2023-01-01 ENCOUNTER — HOSPITAL ENCOUNTER (OUTPATIENT)
Dept: WOUND CARE | Facility: HOSPITAL | Age: 48
Discharge: HOME | End: 2023-08-25
Attending: FAMILY MEDICINE
Payer: MEDICARE

## 2023-01-01 ENCOUNTER — HOSPITAL ENCOUNTER (OUTPATIENT)
Dept: RADIOLOGY | Facility: HOSPITAL | Age: 48
Discharge: HOME | End: 2023-05-23
Attending: NURSE PRACTITIONER
Payer: MEDICARE

## 2023-01-01 ENCOUNTER — HOSPITAL ENCOUNTER (OUTPATIENT)
Dept: WOUND CARE | Facility: HOSPITAL | Age: 48
Discharge: HOME | End: 2023-06-16
Attending: FAMILY MEDICINE
Payer: MEDICARE

## 2023-01-01 ENCOUNTER — HOSPITAL ENCOUNTER (OUTPATIENT)
Dept: WOUND CARE | Facility: HOSPITAL | Age: 48
Discharge: HOME | End: 2023-04-28
Attending: FAMILY MEDICINE
Payer: MEDICARE

## 2023-01-01 ENCOUNTER — HOSPITAL ENCOUNTER (OUTPATIENT)
Dept: WOUND CARE | Facility: HOSPITAL | Age: 48
Discharge: HOME | End: 2023-07-28
Attending: FAMILY MEDICINE
Payer: MEDICARE

## 2023-01-01 VITALS
SYSTOLIC BLOOD PRESSURE: 110 MMHG | DIASTOLIC BLOOD PRESSURE: 71 MMHG | BODY MASS INDEX: 22.38 KG/M2 | HEART RATE: 93 BPM | WEIGHT: 165 LBS | OXYGEN SATURATION: 99 %

## 2023-01-01 VITALS
RESPIRATION RATE: 18 BRPM | HEART RATE: 71 BPM | WEIGHT: 165 LBS | BODY MASS INDEX: 21.17 KG/M2 | HEIGHT: 74 IN | DIASTOLIC BLOOD PRESSURE: 74 MMHG | SYSTOLIC BLOOD PRESSURE: 125 MMHG

## 2023-01-01 VITALS
TEMPERATURE: 98 F | BODY MASS INDEX: 21.17 KG/M2 | HEART RATE: 95 BPM | HEART RATE: 80 BPM | BODY MASS INDEX: 21.17 KG/M2 | HEIGHT: 74 IN | RESPIRATION RATE: 20 BRPM | SYSTOLIC BLOOD PRESSURE: 140 MMHG | DIASTOLIC BLOOD PRESSURE: 81 MMHG | OXYGEN SATURATION: 100 % | DIASTOLIC BLOOD PRESSURE: 84 MMHG | WEIGHT: 165 LBS | RESPIRATION RATE: 20 BRPM | HEIGHT: 74 IN | WEIGHT: 165 LBS | SYSTOLIC BLOOD PRESSURE: 127 MMHG

## 2023-01-01 VITALS
DIASTOLIC BLOOD PRESSURE: 78 MMHG | HEART RATE: 78 BPM | SYSTOLIC BLOOD PRESSURE: 127 MMHG | WEIGHT: 165 LBS | HEIGHT: 74 IN | RESPIRATION RATE: 18 BRPM | BODY MASS INDEX: 21.17 KG/M2

## 2023-01-01 VITALS
WEIGHT: 165 LBS | OXYGEN SATURATION: 98 % | HEIGHT: 74 IN | SYSTOLIC BLOOD PRESSURE: 102 MMHG | BODY MASS INDEX: 21.17 KG/M2 | TEMPERATURE: 98 F | HEART RATE: 95 BPM | DIASTOLIC BLOOD PRESSURE: 76 MMHG | RESPIRATION RATE: 17 BRPM

## 2023-01-01 VITALS
SYSTOLIC BLOOD PRESSURE: 99 MMHG | WEIGHT: 165 LBS | HEART RATE: 88 BPM | HEIGHT: 74 IN | DIASTOLIC BLOOD PRESSURE: 62 MMHG | RESPIRATION RATE: 18 BRPM | BODY MASS INDEX: 21.17 KG/M2

## 2023-01-01 VITALS
BODY MASS INDEX: 21.17 KG/M2 | HEART RATE: 78 BPM | DIASTOLIC BLOOD PRESSURE: 72 MMHG | SYSTOLIC BLOOD PRESSURE: 101 MMHG | RESPIRATION RATE: 17 BRPM | WEIGHT: 165 LBS | HEIGHT: 74 IN

## 2023-01-01 VITALS
HEART RATE: 80 BPM | DIASTOLIC BLOOD PRESSURE: 70 MMHG | RESPIRATION RATE: 18 BRPM | BODY MASS INDEX: 21.17 KG/M2 | WEIGHT: 165 LBS | HEIGHT: 74 IN | SYSTOLIC BLOOD PRESSURE: 114 MMHG

## 2023-01-01 VITALS
HEART RATE: 112 BPM | DIASTOLIC BLOOD PRESSURE: 68 MMHG | HEIGHT: 74 IN | RESPIRATION RATE: 18 BRPM | SYSTOLIC BLOOD PRESSURE: 98 MMHG | BODY MASS INDEX: 21.17 KG/M2 | WEIGHT: 165 LBS

## 2023-01-01 VITALS
HEART RATE: 98 BPM | RESPIRATION RATE: 20 BRPM | HEIGHT: 74 IN | SYSTOLIC BLOOD PRESSURE: 98 MMHG | DIASTOLIC BLOOD PRESSURE: 68 MMHG | BODY MASS INDEX: 21.18 KG/M2

## 2023-01-01 VITALS — DIASTOLIC BLOOD PRESSURE: 80 MMHG | SYSTOLIC BLOOD PRESSURE: 112 MMHG

## 2023-01-01 VITALS
WEIGHT: 165 LBS | SYSTOLIC BLOOD PRESSURE: 98 MMHG | BODY MASS INDEX: 21.17 KG/M2 | DIASTOLIC BLOOD PRESSURE: 68 MMHG | HEIGHT: 74 IN | RESPIRATION RATE: 22 BRPM | HEART RATE: 81 BPM

## 2023-01-01 DIAGNOSIS — L89.154 PRESSURE INJURY OF SACRAL REGION, STAGE 4: ICD-10-CM

## 2023-01-01 DIAGNOSIS — S51.001D OPEN WOUND OF RIGHT ELBOW, SUBSEQUENT ENCOUNTER: ICD-10-CM

## 2023-01-01 DIAGNOSIS — T83.9XXA DIFFICULT FOLEY CATHETER PLACEMENT: ICD-10-CM

## 2023-01-01 DIAGNOSIS — L89.894 PRESSURE INJURY OF RIGHT LEG, STAGE 4: Primary | ICD-10-CM

## 2023-01-01 DIAGNOSIS — L89.894 PRESSURE INJURY OF LEFT LEG, STAGE 4: ICD-10-CM

## 2023-01-01 DIAGNOSIS — D63.8 ANEMIA OF CHRONIC DISEASE: ICD-10-CM

## 2023-01-01 DIAGNOSIS — I73.9 PERIPHERAL VASCULAR DISEASE, UNSPECIFIED: ICD-10-CM

## 2023-01-01 DIAGNOSIS — L89.114 PRESSURE INJURY OF RIGHT UPPER BACK, STAGE 4: ICD-10-CM

## 2023-01-01 DIAGNOSIS — N13.39 OTHER HYDRONEPHROSIS: Primary | ICD-10-CM

## 2023-01-01 DIAGNOSIS — F41.1 GENERALIZED ANXIETY DISORDER: Primary | ICD-10-CM

## 2023-01-01 DIAGNOSIS — S51.001A OPEN WOUND OF RIGHT ELBOW, INITIAL ENCOUNTER: ICD-10-CM

## 2023-01-01 DIAGNOSIS — R05.3 CHRONIC COUGH: ICD-10-CM

## 2023-01-01 DIAGNOSIS — L89.613 PRESSURE INJURY OF RIGHT HEEL, STAGE 3: ICD-10-CM

## 2023-01-01 DIAGNOSIS — N39.0 BACTERIAL UTI: Primary | ICD-10-CM

## 2023-01-01 DIAGNOSIS — R79.9 ABNORMAL FINDING OF BLOOD CHEMISTRY, UNSPECIFIED: ICD-10-CM

## 2023-01-01 DIAGNOSIS — F41.9 ANXIETY: ICD-10-CM

## 2023-01-01 DIAGNOSIS — D64.9 ANEMIA, UNSPECIFIED TYPE: ICD-10-CM

## 2023-01-01 DIAGNOSIS — R39.9 UTI SYMPTOMS: Primary | ICD-10-CM

## 2023-01-01 DIAGNOSIS — Z93.3 COLOSTOMY STATUS: ICD-10-CM

## 2023-01-01 DIAGNOSIS — G82.50 QUADRIPLEGIA, UNSPECIFIED: Primary | ICD-10-CM

## 2023-01-01 DIAGNOSIS — Z00.00 MEDICARE ANNUAL WELLNESS VISIT, SUBSEQUENT: Primary | ICD-10-CM

## 2023-01-01 DIAGNOSIS — S51.002A OPEN WOUND OF LEFT ELBOW, INITIAL ENCOUNTER: ICD-10-CM

## 2023-01-01 DIAGNOSIS — G47.00 INSOMNIA, UNSPECIFIED TYPE: ICD-10-CM

## 2023-01-01 DIAGNOSIS — N13.9 URINARY OBSTRUCTION: Primary | ICD-10-CM

## 2023-01-01 DIAGNOSIS — N39.0 URINARY TRACT INFECTION, SITE NOT SPECIFIED: ICD-10-CM

## 2023-01-01 DIAGNOSIS — Z09 HOSPITAL DISCHARGE FOLLOW-UP: Primary | ICD-10-CM

## 2023-01-01 DIAGNOSIS — A49.9 BACTERIAL UTI: Primary | ICD-10-CM

## 2023-01-01 DIAGNOSIS — T83.511S URINARY TRACT INFECTION ASSOCIATED WITH CATHETERIZATION OF URINARY TRACT, UNSPECIFIED INDWELLING URINARY CATHETER TYPE, SEQUELA: Primary | ICD-10-CM

## 2023-01-01 DIAGNOSIS — M62.838 MUSCLE SPASM: ICD-10-CM

## 2023-01-01 DIAGNOSIS — N13.39 OTHER HYDRONEPHROSIS: ICD-10-CM

## 2023-01-01 DIAGNOSIS — R33.8 OTHER RETENTION OF URINE: ICD-10-CM

## 2023-01-01 DIAGNOSIS — G82.50 QUADRIPLEGIA, UNSPECIFIED: ICD-10-CM

## 2023-01-01 DIAGNOSIS — N39.0 URINARY TRACT INFECTION ASSOCIATED WITH CATHETERIZATION OF URINARY TRACT, UNSPECIFIED INDWELLING URINARY CATHETER TYPE, SEQUELA: Primary | ICD-10-CM

## 2023-01-01 DIAGNOSIS — L89.623 PRESSURE INJURY OF LEFT HEEL, STAGE 3: ICD-10-CM

## 2023-01-01 DIAGNOSIS — G82.50 QUADRIPLEGIA: ICD-10-CM

## 2023-01-01 DIAGNOSIS — K80.20 MULTIPLE GALLSTONES: ICD-10-CM

## 2023-01-01 DIAGNOSIS — R07.9 CHEST PAIN: ICD-10-CM

## 2023-01-01 LAB
ABO + RH BLD: NORMAL
ALBUMIN SERPL-MCNC: 3.3 G/DL (ref 3.5–5)
ALBUMIN/GLOB SERPL: 0.6 RATIO (ref 1.1–2)
ALP SERPL-CCNC: 149 UNIT/L (ref 40–150)
ALT SERPL-CCNC: 9 UNIT/L (ref 0–55)
AMORPH PHOS CRY URNS QL MICRO: ABNORMAL /HPF
AMORPH PHOS CRY URNS QL MICRO: ABNORMAL /HPF
ANION GAP SERPL CALC-SCNC: 6 MEQ/L
ANION GAP SERPL CALC-SCNC: 9 MEQ/L
APPEARANCE UR: ABNORMAL
AST SERPL-CCNC: 15 UNIT/L (ref 5–34)
BACTERIA #/AREA URNS AUTO: ABNORMAL /HPF
BACTERIA BLD CULT: NORMAL
BACTERIA UR CULT: ABNORMAL
BACTERIA UR CULT: NORMAL
BACTERIA UR CULT: NORMAL
BASOPHILS # BLD AUTO: 0.02 X10(3)/MCL (ref 0–0.2)
BASOPHILS # BLD AUTO: 0.05 X10(3)/MCL (ref 0–0.2)
BASOPHILS # BLD AUTO: 0.06 X10(3)/MCL (ref 0–0.2)
BASOPHILS NFR BLD AUTO: 0.2 %
BASOPHILS NFR BLD AUTO: 0.2 %
BASOPHILS NFR BLD AUTO: 0.3 %
BILIRUB UR QL STRIP.AUTO: NEGATIVE
BILIRUB UR QL STRIP.AUTO: NEGATIVE MG/DL
BILIRUBIN DIRECT+TOT PNL SERPL-MCNC: 0.6 MG/DL
BLD PROD TYP BPU: NORMAL
BLOOD UNIT EXPIRATION DATE: NORMAL
BLOOD UNIT TYPE CODE: 5100
BUN SERPL-MCNC: 12.6 MG/DL (ref 8.9–20.6)
BUN SERPL-MCNC: 12.8 MG/DL (ref 8.9–20.6)
BUN SERPL-MCNC: 15.2 MG/DL (ref 8.9–20.6)
CALCIUM SERPL-MCNC: 8.1 MG/DL (ref 8.4–10.2)
CALCIUM SERPL-MCNC: 8.3 MG/DL (ref 8.4–10.2)
CALCIUM SERPL-MCNC: 9 MG/DL (ref 8.4–10.2)
CHLORIDE SERPL-SCNC: 106 MMOL/L (ref 98–107)
CHLORIDE SERPL-SCNC: 107 MMOL/L (ref 98–107)
CHLORIDE SERPL-SCNC: 108 MMOL/L (ref 98–107)
CO2 SERPL-SCNC: 19 MMOL/L (ref 22–29)
CO2 SERPL-SCNC: 19 MMOL/L (ref 22–29)
CO2 SERPL-SCNC: 22 MMOL/L (ref 22–29)
COLOR UR AUTO: ABNORMAL
COLOR UR AUTO: ABNORMAL
COLOR UR AUTO: YELLOW
COLOR UR: YELLOW
CREAT SERPL-MCNC: 0.46 MG/DL (ref 0.73–1.18)
CREAT SERPL-MCNC: 0.63 MG/DL (ref 0.73–1.18)
CREAT SERPL-MCNC: 0.7 MG/DL (ref 0.73–1.18)
CREAT/UREA NIT SERPL: 24
CREAT/UREA NIT SERPL: 28
CROSSMATCH INTERPRETATION: NORMAL
DISPENSE STATUS: NORMAL
EOSINOPHIL # BLD AUTO: 0.01 X10(3)/MCL (ref 0–0.9)
EOSINOPHIL # BLD AUTO: 0.07 X10(3)/MCL (ref 0–0.9)
EOSINOPHIL # BLD AUTO: 0.09 X10(3)/MCL (ref 0–0.9)
EOSINOPHIL NFR BLD AUTO: 0 %
EOSINOPHIL NFR BLD AUTO: 0.4 %
EOSINOPHIL NFR BLD AUTO: 1 %
ERYTHROCYTE [DISTWIDTH] IN BLOOD BY AUTOMATED COUNT: 18.3 % (ref 11.5–17)
ERYTHROCYTE [DISTWIDTH] IN BLOOD BY AUTOMATED COUNT: 18.4 % (ref 11.5–17)
ERYTHROCYTE [DISTWIDTH] IN BLOOD BY AUTOMATED COUNT: 18.8 % (ref 11.5–17)
FERRITIN SERPL-MCNC: 172.42 NG/ML (ref 21.81–274.66)
GFR SERPLBLD CREATININE-BSD FMLA CKD-EPI: >60 MLS/MIN/1.73/M2
GLOBULIN SER-MCNC: 6 GM/DL (ref 2.4–3.5)
GLUCOSE SERPL-MCNC: 121 MG/DL (ref 74–100)
GLUCOSE SERPL-MCNC: 213 MG/DL (ref 74–100)
GLUCOSE SERPL-MCNC: 78 MG/DL (ref 74–100)
GLUCOSE UR QL STRIP.AUTO: NEGATIVE
GLUCOSE UR QL STRIP.AUTO: NEGATIVE MG/DL
GROUP & RH: NORMAL
HCT VFR BLD AUTO: 24.9 % (ref 42–52)
HCT VFR BLD AUTO: 26.5 % (ref 42–52)
HCT VFR BLD AUTO: 35.2 % (ref 42–52)
HGB BLD-MCNC: 10.1 G/DL (ref 14–18)
HGB BLD-MCNC: 7.4 G/DL (ref 14–18)
HGB BLD-MCNC: 7.8 G/DL (ref 14–18)
HYALINE CASTS URNS QL MICRO: ABNORMAL /LPF
IMM GRANULOCYTES # BLD AUTO: 0.04 X10(3)/MCL (ref 0–0.04)
IMM GRANULOCYTES # BLD AUTO: 0.08 X10(3)/MCL (ref 0–0.04)
IMM GRANULOCYTES # BLD AUTO: 0.14 X10(3)/MCL (ref 0–0.04)
IMM GRANULOCYTES NFR BLD AUTO: 0.4 %
IMM GRANULOCYTES NFR BLD AUTO: 0.5 %
IMM GRANULOCYTES NFR BLD AUTO: 0.6 %
INDIRECT COOMBS GEL: NORMAL
IRON SATN MFR SERPL: 3 % (ref 20–50)
IRON SERPL-MCNC: 5 UG/DL (ref 65–175)
KETONES UR QL STRIP.AUTO: ABNORMAL
KETONES UR QL STRIP.AUTO: NEGATIVE
KETONES UR QL STRIP.AUTO: NEGATIVE
KETONES UR QL STRIP.AUTO: NEGATIVE MG/DL
LEUKOCYTE ESTERASE UR QL STRIP.AUTO: ABNORMAL
LEUKOCYTE ESTERASE UR QL STRIP.AUTO: ABNORMAL UNIT/L
LYMPHOCYTES # BLD AUTO: 0.34 X10(3)/MCL (ref 0.6–4.6)
LYMPHOCYTES # BLD AUTO: 1.11 X10(3)/MCL (ref 0.6–4.6)
LYMPHOCYTES # BLD AUTO: 1.43 X10(3)/MCL (ref 0.6–4.6)
LYMPHOCYTES NFR BLD AUTO: 1.4 %
LYMPHOCYTES NFR BLD AUTO: 16.5 %
LYMPHOCYTES NFR BLD AUTO: 6.2 %
MCH RBC QN AUTO: 19 PG
MCH RBC QN AUTO: 19.2 PG
MCH RBC QN AUTO: 19.3 PG
MCHC RBC AUTO-ENTMCNC: 28.7 G/DL (ref 33–36)
MCHC RBC AUTO-ENTMCNC: 29.4 G/DL (ref 33–36)
MCHC RBC AUTO-ENTMCNC: 29.7 G/DL (ref 33–36)
MCV RBC AUTO: 64.8 FL (ref 80–94)
MCV RBC AUTO: 65.1 FL (ref 80–94)
MCV RBC AUTO: 66.3 FL (ref 80–94)
MONOCYTES # BLD AUTO: 0.07 X10(3)/MCL (ref 0.1–1.3)
MONOCYTES # BLD AUTO: 0.61 X10(3)/MCL (ref 0.1–1.3)
MONOCYTES # BLD AUTO: 0.67 X10(3)/MCL (ref 0.1–1.3)
MONOCYTES NFR BLD AUTO: 0.3 %
MONOCYTES NFR BLD AUTO: 3.8 %
MONOCYTES NFR BLD AUTO: 7 %
MUCOUS THREADS URNS QL MICRO: ABNORMAL /LPF
NEUTROPHILS # BLD AUTO: 15.84 X10(3)/MCL (ref 2.1–9.2)
NEUTROPHILS # BLD AUTO: 24.21 X10(3)/MCL (ref 2.1–9.2)
NEUTROPHILS # BLD AUTO: 6.47 X10(3)/MCL (ref 2.1–9.2)
NEUTROPHILS NFR BLD AUTO: 74.8 %
NEUTROPHILS NFR BLD AUTO: 88.9 %
NEUTROPHILS NFR BLD AUTO: 97.5 %
NITRITE UR QL STRIP.AUTO: NEGATIVE
NITRITE UR QL STRIP.AUTO: POSITIVE
NRBC BLD AUTO-RTO: 0 %
NRBC BLD AUTO-RTO: 0 %
NRBC BLD AUTO-RTO: 0.1 %
PH UR STRIP.AUTO: 7.5 [PH]
PH UR STRIP.AUTO: 7.5 [PH]
PH UR STRIP.AUTO: >=9 [PH]
PH UR STRIP.AUTO: >=9 [PH]
PLATELET # BLD AUTO: 388 X10(3)/MCL (ref 130–400)
PLATELET # BLD AUTO: 408 X10(3)/MCL (ref 130–400)
PLATELET # BLD AUTO: 495 X10(3)/MCL (ref 130–400)
PMV BLD AUTO: 10 FL (ref 7.4–10.4)
PMV BLD AUTO: 10.1 FL (ref 7.4–10.4)
PMV BLD AUTO: 10.3 FL (ref 7.4–10.4)
POTASSIUM SERPL-SCNC: 3.9 MMOL/L (ref 3.5–5.1)
POTASSIUM SERPL-SCNC: 4.2 MMOL/L (ref 3.5–5.1)
POTASSIUM SERPL-SCNC: 4.2 MMOL/L (ref 3.5–5.1)
PROT SERPL-MCNC: 9.3 GM/DL (ref 6.4–8.3)
PROT UR QL STRIP.AUTO: ABNORMAL
PROT UR QL STRIP.AUTO: ABNORMAL MG/DL
RBC # BLD AUTO: 3.84 X10(6)/MCL (ref 4.7–6.1)
RBC # BLD AUTO: 4.07 X10(6)/MCL (ref 4.7–6.1)
RBC # BLD AUTO: 5.31 X10(6)/MCL (ref 4.7–6.1)
RBC #/AREA URNS AUTO: >100 /HPF
RBC #/AREA URNS AUTO: ABNORMAL /HPF
RBC UR QL AUTO: ABNORMAL
RBC UR QL AUTO: ABNORMAL UNIT/L
SODIUM SERPL-SCNC: 134 MMOL/L (ref 136–145)
SODIUM SERPL-SCNC: 135 MMOL/L (ref 136–145)
SODIUM SERPL-SCNC: 141 MMOL/L (ref 136–145)
SP GR UR STRIP.AUTO: 1.01
SP GR UR STRIP.AUTO: 1.01 (ref 1–1.03)
SQUAMOUS #/AREA URNS AUTO: ABNORMAL /HPF
TIBC SERPL-MCNC: 149 UG/DL (ref 69–240)
TIBC SERPL-MCNC: 154 UG/DL (ref 250–450)
TRANSFERRIN SERPL-MCNC: 148 MG/DL (ref 174–364)
TRI-PHOS CRY URNS QL MICRO: ABNORMAL /HPF
TRI-PHOS CRY URNS QL MICRO: ABNORMAL /HPF
UNIT NUMBER: NORMAL
UROBILINOGEN UR STRIP-ACNC: 0.2
UROBILINOGEN UR STRIP-ACNC: 0.2 MG/DL
WBC # SPEC AUTO: 17.8 X10(3)/MCL (ref 4.5–11.5)
WBC # SPEC AUTO: 24.8 X10(3)/MCL (ref 4.5–11.5)
WBC # SPEC AUTO: 8.7 X10(3)/MCL (ref 4.5–11.5)
WBC #/AREA URNS AUTO: ABNORMAL /HPF

## 2023-01-01 PROCEDURE — 25000003 PHARM REV CODE 250: Performed by: INTERNAL MEDICINE

## 2023-01-01 PROCEDURE — 27000999 HC MEDICAL RECORD PHOTO DOCUMENTATION

## 2023-01-01 PROCEDURE — 63600175 PHARM REV CODE 636 W HCPCS: Performed by: INTERNAL MEDICINE

## 2023-01-01 PROCEDURE — 86923 COMPATIBILITY TEST ELECTRIC: CPT | Performed by: INTERNAL MEDICINE

## 2023-01-01 PROCEDURE — 37000008 HC ANESTHESIA 1ST 15 MINUTES: Performed by: UROLOGY

## 2023-01-01 PROCEDURE — 63600175 PHARM REV CODE 636 W HCPCS

## 2023-01-01 PROCEDURE — 81001 URINALYSIS AUTO W/SCOPE: CPT | Performed by: NURSE PRACTITIONER

## 2023-01-01 PROCEDURE — 99495 TCM SERVICES (MODERATE COMPLEXITY): ICD-10-PCS | Mod: ,,, | Performed by: FAMILY MEDICINE

## 2023-01-01 PROCEDURE — 82728 ASSAY OF FERRITIN: CPT | Performed by: INTERNAL MEDICINE

## 2023-01-01 PROCEDURE — 81001 URINALYSIS AUTO W/SCOPE: CPT | Performed by: FAMILY MEDICINE

## 2023-01-01 PROCEDURE — 80053 COMPREHEN METABOLIC PANEL: CPT | Performed by: NURSE PRACTITIONER

## 2023-01-01 PROCEDURE — 17250 CHEM CAUT OF GRANLTJ TISSUE: CPT

## 2023-01-01 PROCEDURE — 99213 OFFICE O/P EST LOW 20 MIN: CPT

## 2023-01-01 PROCEDURE — 87186 SC STD MICRODIL/AGAR DIL: CPT | Performed by: NURSE PRACTITIONER

## 2023-01-01 PROCEDURE — 36000706: Performed by: UROLOGY

## 2023-01-01 PROCEDURE — 99495 TRANSJ CARE MGMT MOD F2F 14D: CPT | Mod: ,,, | Performed by: FAMILY MEDICINE

## 2023-01-01 PROCEDURE — G0439 PPPS, SUBSEQ VISIT: HCPCS | Mod: ,,, | Performed by: FAMILY MEDICINE

## 2023-01-01 PROCEDURE — P9016 RBC LEUKOCYTES REDUCED: HCPCS | Performed by: INTERNAL MEDICINE

## 2023-01-01 PROCEDURE — 25000003 PHARM REV CODE 250

## 2023-01-01 PROCEDURE — 63600175 PHARM REV CODE 636 W HCPCS: Performed by: NURSE PRACTITIONER

## 2023-01-01 PROCEDURE — 96372 THER/PROPH/DIAG INJ SC/IM: CPT | Performed by: EMERGENCY MEDICINE

## 2023-01-01 PROCEDURE — 36000707: Performed by: UROLOGY

## 2023-01-01 PROCEDURE — 85025 COMPLETE CBC W/AUTO DIFF WBC: CPT | Performed by: INTERNAL MEDICINE

## 2023-01-01 PROCEDURE — 80048 BASIC METABOLIC PNL TOTAL CA: CPT | Performed by: INTERNAL MEDICINE

## 2023-01-01 PROCEDURE — 63600175 PHARM REV CODE 636 W HCPCS: Performed by: EMERGENCY MEDICINE

## 2023-01-01 PROCEDURE — 74176 CT ABD & PELVIS W/O CONTRAST: CPT | Mod: TC

## 2023-01-01 PROCEDURE — 87088 URINE BACTERIA CULTURE: CPT | Performed by: NURSE PRACTITIONER

## 2023-01-01 PROCEDURE — 85025 COMPLETE CBC W/AUTO DIFF WBC: CPT | Performed by: NURSE PRACTITIONER

## 2023-01-01 PROCEDURE — G0378 HOSPITAL OBSERVATION PER HR: HCPCS

## 2023-01-01 PROCEDURE — 83550 IRON BINDING TEST: CPT | Performed by: INTERNAL MEDICINE

## 2023-01-01 PROCEDURE — 99212 OFFICE O/P EST SF 10 MIN: CPT

## 2023-01-01 PROCEDURE — 94761 N-INVAS EAR/PLS OXIMETRY MLT: CPT

## 2023-01-01 PROCEDURE — 99214 OFFICE O/P EST MOD 30 MIN: CPT | Mod: 95,,, | Performed by: FAMILY MEDICINE

## 2023-01-01 PROCEDURE — 71000015 HC POSTOP RECOV 1ST HR: Performed by: UROLOGY

## 2023-01-01 PROCEDURE — 11000001 HC ACUTE MED/SURG PRIVATE ROOM

## 2023-01-01 PROCEDURE — 27000221 HC OXYGEN, UP TO 24 HOURS

## 2023-01-01 PROCEDURE — 99214 PR OFFICE/OUTPT VISIT, EST, LEVL IV, 30-39 MIN: ICD-10-PCS | Mod: 95,,, | Performed by: FAMILY MEDICINE

## 2023-01-01 PROCEDURE — 96365 THER/PROPH/DIAG IV INF INIT: CPT

## 2023-01-01 PROCEDURE — G0439 PR MEDICARE ANNUAL WELLNESS SUBSEQUENT VISIT: ICD-10-PCS | Mod: ,,, | Performed by: FAMILY MEDICINE

## 2023-01-01 PROCEDURE — 87186 SC STD MICRODIL/AGAR DIL: CPT | Mod: 91 | Performed by: FAMILY MEDICINE

## 2023-01-01 PROCEDURE — 21400001 HC TELEMETRY ROOM

## 2023-01-01 PROCEDURE — 87088 URINE BACTERIA CULTURE: CPT | Performed by: FAMILY MEDICINE

## 2023-01-01 PROCEDURE — 25000003 PHARM REV CODE 250: Performed by: UROLOGY

## 2023-01-01 PROCEDURE — 36410 VNPNXR 3YR/> PHY/QHP DX/THER: CPT

## 2023-01-01 PROCEDURE — C1751 CATH, INF, PER/CENT/MIDLINE: HCPCS

## 2023-01-01 PROCEDURE — 87086 URINE CULTURE/COLONY COUNT: CPT | Performed by: FAMILY MEDICINE

## 2023-01-01 PROCEDURE — 51702 INSERT TEMP BLADDER CATH: CPT

## 2023-01-01 PROCEDURE — 86900 BLOOD TYPING SEROLOGIC ABO: CPT | Performed by: INTERNAL MEDICINE

## 2023-01-01 PROCEDURE — 25000003 PHARM REV CODE 250: Performed by: EMERGENCY MEDICINE

## 2023-01-01 PROCEDURE — G0179 MD RECERTIFICATION HHA PT: HCPCS | Mod: ,,, | Performed by: FAMILY MEDICINE

## 2023-01-01 PROCEDURE — 71000033 HC RECOVERY, INTIAL HOUR: Performed by: UROLOGY

## 2023-01-01 PROCEDURE — 11719 TRIM NAIL(S) ANY NUMBER: CPT

## 2023-01-01 PROCEDURE — 76937 US GUIDE VASCULAR ACCESS: CPT

## 2023-01-01 PROCEDURE — 36430 TRANSFUSION BLD/BLD COMPNT: CPT

## 2023-01-01 PROCEDURE — 37000009 HC ANESTHESIA EA ADD 15 MINS: Performed by: UROLOGY

## 2023-01-01 PROCEDURE — 25000003 PHARM REV CODE 250: Performed by: NURSE PRACTITIONER

## 2023-01-01 PROCEDURE — C1769 GUIDE WIRE: HCPCS | Performed by: UROLOGY

## 2023-01-01 PROCEDURE — G0179 PR HOME HEALTH MD RECERTIFICATION: ICD-10-PCS | Mod: ,,, | Performed by: FAMILY MEDICINE

## 2023-01-01 PROCEDURE — 99285 EMERGENCY DEPT VISIT HI MDM: CPT

## 2023-01-01 PROCEDURE — 27201423 OPTIME MED/SURG SUP & DEVICES STERILE SUPPLY: Performed by: UROLOGY

## 2023-01-01 PROCEDURE — 51798 US URINE CAPACITY MEASURE: CPT

## 2023-01-01 PROCEDURE — 87040 BLOOD CULTURE FOR BACTERIA: CPT | Performed by: INTERNAL MEDICINE

## 2023-01-01 RX ORDER — HYDROCODONE BITARTRATE AND ACETAMINOPHEN 10; 325 MG/1; MG/1
1 TABLET ORAL EVERY 8 HOURS PRN
Qty: 90 TABLET | Refills: 0 | Status: SHIPPED | OUTPATIENT
Start: 2023-01-01 | End: 2024-01-01

## 2023-01-01 RX ORDER — PROMETHAZINE HYDROCHLORIDE 6.25 MG/5ML
6.25 SYRUP ORAL EVERY 6 HOURS PRN
Qty: 473 ML | Refills: 3 | Status: SHIPPED | OUTPATIENT
Start: 2023-01-01 | End: 2023-01-01

## 2023-01-01 RX ORDER — ZOLPIDEM TARTRATE 5 MG/1
10 TABLET ORAL NIGHTLY PRN
Status: DISCONTINUED | OUTPATIENT
Start: 2023-01-01 | End: 2023-01-01 | Stop reason: HOSPADM

## 2023-01-01 RX ORDER — PHENYLEPHRINE HCL IN 0.9% NACL 1 MG/10 ML
SYRINGE (ML) INTRAVENOUS
Status: DISCONTINUED | OUTPATIENT
Start: 2023-01-01 | End: 2023-01-01

## 2023-01-01 RX ORDER — BACLOFEN 10 MG/1
10 TABLET ORAL ONCE
Status: COMPLETED | OUTPATIENT
Start: 2023-01-01 | End: 2023-01-01

## 2023-01-01 RX ORDER — PANTOPRAZOLE SODIUM 40 MG/1
40 TABLET, DELAYED RELEASE ORAL
Status: CANCELLED | OUTPATIENT
Start: 2023-01-01

## 2023-01-01 RX ORDER — CIPROFLOXACIN 500 MG/1
500 TABLET ORAL EVERY 12 HOURS
Qty: 14 TABLET | Refills: 0 | Status: SHIPPED | OUTPATIENT
Start: 2023-01-01 | End: 2023-01-01

## 2023-01-01 RX ORDER — NITROFURANTOIN 25; 75 MG/1; MG/1
100 CAPSULE ORAL EVERY 12 HOURS
Qty: 14 CAPSULE | Refills: 0 | Status: SHIPPED | OUTPATIENT
Start: 2023-01-01 | End: 2023-01-01

## 2023-01-01 RX ORDER — PROPOFOL 10 MG/ML
VIAL (ML) INTRAVENOUS
Status: DISCONTINUED | OUTPATIENT
Start: 2023-01-01 | End: 2023-01-01

## 2023-01-01 RX ORDER — BACLOFEN 10 MG/1
10 TABLET ORAL 3 TIMES DAILY
Status: CANCELLED | OUTPATIENT
Start: 2023-01-01

## 2023-01-01 RX ORDER — HYDROCODONE BITARTRATE AND ACETAMINOPHEN 10; 325 MG/1; MG/1
1 TABLET ORAL EVERY 8 HOURS PRN
Qty: 63 TABLET | Refills: 0 | Status: SHIPPED | OUTPATIENT
Start: 2023-01-01 | End: 2023-01-01 | Stop reason: SDUPTHER

## 2023-01-01 RX ORDER — HYDROCODONE BITARTRATE AND ACETAMINOPHEN 10; 325 MG/1; MG/1
1 TABLET ORAL EVERY 8 HOURS PRN
Qty: 21 TABLET | Refills: 0 | Status: SHIPPED | OUTPATIENT
Start: 2023-01-01 | End: 2023-01-01

## 2023-01-01 RX ORDER — BUSPIRONE HYDROCHLORIDE 5 MG/1
5 TABLET ORAL 2 TIMES DAILY
Qty: 60 TABLET | Refills: 0 | Status: SHIPPED | OUTPATIENT
Start: 2023-01-01 | End: 2023-01-01

## 2023-01-01 RX ORDER — FUROSEMIDE 40 MG/1
40 TABLET ORAL 2 TIMES DAILY
Status: DISCONTINUED | OUTPATIENT
Start: 2023-01-01 | End: 2023-01-01 | Stop reason: HOSPADM

## 2023-01-01 RX ORDER — FUROSEMIDE 20 MG/1
20 TABLET ORAL DAILY
Qty: 14 TABLET | Refills: 0 | Status: SHIPPED | OUTPATIENT
Start: 2023-01-01 | End: 2023-01-01

## 2023-01-01 RX ORDER — BACLOFEN 10 MG/1
10 TABLET ORAL 3 TIMES DAILY
Qty: 270 TABLET | Refills: 0 | Status: SHIPPED | OUTPATIENT
Start: 2023-01-01

## 2023-01-01 RX ORDER — HYDROCODONE BITARTRATE AND ACETAMINOPHEN 10; 325 MG/1; MG/1
1 TABLET ORAL EVERY 12 HOURS PRN
Qty: 42 TABLET | Refills: 0 | Status: SHIPPED | OUTPATIENT
Start: 2023-01-01 | End: 2023-01-01

## 2023-01-01 RX ORDER — POLYETHYLENE GLYCOL 3350 17 G/17G
17 POWDER, FOR SOLUTION ORAL 2 TIMES DAILY PRN
COMMUNITY

## 2023-01-01 RX ORDER — ACETAMINOPHEN 325 MG/1
650 TABLET ORAL EVERY 6 HOURS PRN
Status: DISCONTINUED | OUTPATIENT
Start: 2023-01-01 | End: 2023-01-01 | Stop reason: HOSPADM

## 2023-01-01 RX ORDER — BACLOFEN 5 MG/1
5 TABLET ORAL 3 TIMES DAILY
Status: DISCONTINUED | OUTPATIENT
Start: 2023-01-01 | End: 2023-01-01

## 2023-01-01 RX ORDER — PROMETHAZINE HYDROCHLORIDE 6.25 MG/5ML
6.25 SYRUP ORAL EVERY 6 HOURS PRN
Qty: 473 ML | Refills: 3 | Status: SHIPPED | OUTPATIENT
Start: 2023-01-01 | End: 2023-01-01 | Stop reason: SDUPTHER

## 2023-01-01 RX ORDER — VANCOMYCIN HCL IN 5 % DEXTROSE 1G/250ML
1000 PLASTIC BAG, INJECTION (ML) INTRAVENOUS
Status: DISCONTINUED | OUTPATIENT
Start: 2023-01-01 | End: 2023-01-01

## 2023-01-01 RX ORDER — PANTOPRAZOLE SODIUM 40 MG/1
40 TABLET, DELAYED RELEASE ORAL DAILY
Qty: 30 TABLET | Refills: 0
Start: 2023-01-01 | End: 2023-01-01

## 2023-01-01 RX ORDER — HYDROCODONE BITARTRATE AND ACETAMINOPHEN 5; 325 MG/1; MG/1
1 TABLET ORAL EVERY 4 HOURS PRN
Status: DISCONTINUED | OUTPATIENT
Start: 2023-01-01 | End: 2023-01-01 | Stop reason: HOSPADM

## 2023-01-01 RX ORDER — ONDANSETRON HYDROCHLORIDE 2 MG/ML
INJECTION, SOLUTION INTRAMUSCULAR; INTRAVENOUS
Status: DISCONTINUED | OUTPATIENT
Start: 2023-01-01 | End: 2023-01-01

## 2023-01-01 RX ORDER — TRAZODONE HYDROCHLORIDE 50 MG/1
75 TABLET ORAL NIGHTLY PRN
Qty: 90 TABLET | Refills: 1 | Status: SHIPPED | OUTPATIENT
Start: 2023-01-01 | End: 2023-01-01

## 2023-01-01 RX ORDER — HYDRALAZINE HYDROCHLORIDE 20 MG/ML
10 INJECTION INTRAMUSCULAR; INTRAVENOUS EVERY 4 HOURS PRN
Status: DISCONTINUED | OUTPATIENT
Start: 2023-01-01 | End: 2023-01-01 | Stop reason: HOSPADM

## 2023-01-01 RX ORDER — TALC
6 POWDER (GRAM) TOPICAL NIGHTLY PRN
Status: DISCONTINUED | OUTPATIENT
Start: 2023-01-01 | End: 2023-01-01 | Stop reason: HOSPADM

## 2023-01-01 RX ORDER — LIDOCAINE HYDROCHLORIDE 20 MG/ML
INJECTION, SOLUTION EPIDURAL; INFILTRATION; INTRACAUDAL; PERINEURAL
Status: DISCONTINUED | OUTPATIENT
Start: 2023-01-01 | End: 2023-01-01

## 2023-01-01 RX ORDER — ZOLPIDEM TARTRATE 5 MG/1
5 TABLET ORAL NIGHTLY PRN
Status: DISCONTINUED | OUTPATIENT
Start: 2023-01-01 | End: 2023-01-01

## 2023-01-01 RX ORDER — KETOROLAC TROMETHAMINE 30 MG/ML
15 INJECTION, SOLUTION INTRAMUSCULAR; INTRAVENOUS EVERY 8 HOURS PRN
Status: ACTIVE | OUTPATIENT
Start: 2023-01-01 | End: 2023-01-01

## 2023-01-01 RX ORDER — LEVOFLOXACIN 5 MG/ML
500 INJECTION, SOLUTION INTRAVENOUS
Status: DISCONTINUED | OUTPATIENT
Start: 2023-01-01 | End: 2023-01-01

## 2023-01-01 RX ORDER — MIDAZOLAM HYDROCHLORIDE 1 MG/ML
2 INJECTION INTRAMUSCULAR; INTRAVENOUS ONCE AS NEEDED
Status: DISCONTINUED | OUTPATIENT
Start: 2023-01-01 | End: 2023-01-01 | Stop reason: HOSPADM

## 2023-01-01 RX ORDER — ONDANSETRON 2 MG/ML
4 INJECTION INTRAMUSCULAR; INTRAVENOUS EVERY 4 HOURS PRN
Status: DISCONTINUED | OUTPATIENT
Start: 2023-01-01 | End: 2023-01-01 | Stop reason: HOSPADM

## 2023-01-01 RX ORDER — MAG HYDROX/ALUMINUM HYD/SIMETH 200-200-20
30 SUSPENSION, ORAL (FINAL DOSE FORM) ORAL 4 TIMES DAILY PRN
Status: DISCONTINUED | OUTPATIENT
Start: 2023-01-01 | End: 2023-01-01 | Stop reason: HOSPADM

## 2023-01-01 RX ORDER — HYDROCODONE BITARTRATE AND ACETAMINOPHEN 10; 325 MG/1; MG/1
1 TABLET ORAL EVERY 8 HOURS PRN
Qty: 90 TABLET | Refills: 0 | Status: SHIPPED | OUTPATIENT
Start: 2023-01-01 | End: 2023-01-01 | Stop reason: SDUPTHER

## 2023-01-01 RX ORDER — HYDROMORPHONE HYDROCHLORIDE 2 MG/ML
1 INJECTION, SOLUTION INTRAMUSCULAR; INTRAVENOUS; SUBCUTANEOUS
Status: DISCONTINUED | OUTPATIENT
Start: 2023-01-01 | End: 2023-01-01

## 2023-01-01 RX ORDER — HYDROCODONE BITARTRATE AND ACETAMINOPHEN 10; 325 MG/1; MG/1
1 TABLET ORAL EVERY 8 HOURS PRN
Qty: 60 TABLET | Refills: 0 | Status: SHIPPED | OUTPATIENT
Start: 2023-01-01 | End: 2023-01-01

## 2023-01-01 RX ORDER — MINOCYCLINE HYDROCHLORIDE 100 MG/1
100 CAPSULE ORAL 2 TIMES DAILY
Qty: 180 CAPSULE | Refills: 0 | Status: SHIPPED | OUTPATIENT
Start: 2023-01-01

## 2023-01-01 RX ORDER — POLYETHYLENE GLYCOL 3350 17 G/17G
17 POWDER, FOR SOLUTION ORAL 2 TIMES DAILY PRN
Status: DISCONTINUED | OUTPATIENT
Start: 2023-01-01 | End: 2023-01-01 | Stop reason: HOSPADM

## 2023-01-01 RX ORDER — SIMETHICONE 80 MG
1 TABLET,CHEWABLE ORAL 4 TIMES DAILY PRN
Status: DISCONTINUED | OUTPATIENT
Start: 2023-01-01 | End: 2023-01-01 | Stop reason: HOSPADM

## 2023-01-01 RX ORDER — MORPHINE SULFATE 4 MG/ML
4 INJECTION, SOLUTION INTRAMUSCULAR; INTRAVENOUS EVERY 4 HOURS PRN
Status: ACTIVE | OUTPATIENT
Start: 2023-01-01 | End: 2023-01-01

## 2023-01-01 RX ORDER — BACLOFEN 10 MG/1
10 TABLET ORAL 3 TIMES DAILY
Status: DISCONTINUED | OUTPATIENT
Start: 2023-01-01 | End: 2023-01-01 | Stop reason: HOSPADM

## 2023-01-01 RX ORDER — PROCHLORPERAZINE EDISYLATE 5 MG/ML
5 INJECTION INTRAMUSCULAR; INTRAVENOUS EVERY 6 HOURS PRN
Status: DISCONTINUED | OUTPATIENT
Start: 2023-01-01 | End: 2023-01-01 | Stop reason: HOSPADM

## 2023-01-01 RX ORDER — HYDROCODONE BITARTRATE AND ACETAMINOPHEN 10; 325 MG/1; MG/1
1 TABLET ORAL EVERY 8 HOURS PRN
Qty: 21 TABLET | Refills: 0 | Status: SHIPPED | OUTPATIENT
Start: 2023-01-01 | End: 2023-01-01 | Stop reason: SDUPTHER

## 2023-01-01 RX ORDER — PROMETHAZINE HYDROCHLORIDE 6.25 MG/5ML
SYRUP ORAL
Qty: 473 ML | Refills: 0 | Status: SHIPPED | OUTPATIENT
Start: 2023-01-01 | End: 2024-01-01

## 2023-01-01 RX ORDER — POTASSIUM CHLORIDE 20 MEQ/1
20 TABLET, EXTENDED RELEASE ORAL DAILY
Status: DISCONTINUED | OUTPATIENT
Start: 2023-01-01 | End: 2023-01-01 | Stop reason: HOSPADM

## 2023-01-01 RX ORDER — MINOCYCLINE HYDROCHLORIDE 100 MG/1
100 CAPSULE ORAL 2 TIMES DAILY
COMMUNITY
Start: 2023-01-01 | End: 2023-01-01

## 2023-01-01 RX ORDER — FENTANYL CITRATE 50 UG/ML
INJECTION, SOLUTION INTRAMUSCULAR; INTRAVENOUS
Status: DISCONTINUED | OUTPATIENT
Start: 2023-01-01 | End: 2023-01-01

## 2023-01-01 RX ORDER — PANTOPRAZOLE SODIUM 40 MG/1
40 TABLET, DELAYED RELEASE ORAL DAILY
Status: DISCONTINUED | OUTPATIENT
Start: 2023-01-01 | End: 2023-01-01 | Stop reason: HOSPADM

## 2023-01-01 RX ORDER — KETOROLAC TROMETHAMINE 30 MG/ML
15 INJECTION, SOLUTION INTRAMUSCULAR; INTRAVENOUS EVERY 8 HOURS PRN
Status: DISPENSED | OUTPATIENT
Start: 2023-01-01 | End: 2023-01-01

## 2023-01-01 RX ORDER — HYDROCODONE BITARTRATE AND ACETAMINOPHEN 10; 325 MG/1; MG/1
1 TABLET ORAL EVERY 8 HOURS PRN
Qty: 90 TABLET | Refills: 0 | Status: SHIPPED | OUTPATIENT
Start: 2023-01-01 | End: 2023-01-01

## 2023-01-01 RX ORDER — ZOLPIDEM TARTRATE 10 MG/1
10 TABLET ORAL NIGHTLY PRN
Qty: 30 TABLET | Refills: 5 | Status: SHIPPED | OUTPATIENT
Start: 2023-01-01 | End: 2023-01-01

## 2023-01-01 RX ORDER — HYDROMORPHONE HYDROCHLORIDE 2 MG/ML
1.5 INJECTION, SOLUTION INTRAMUSCULAR; INTRAVENOUS; SUBCUTANEOUS
Status: COMPLETED | OUTPATIENT
Start: 2023-01-01 | End: 2023-01-01

## 2023-01-01 RX ORDER — HYDROMORPHONE HYDROCHLORIDE 2 MG/ML
2 INJECTION, SOLUTION INTRAMUSCULAR; INTRAVENOUS; SUBCUTANEOUS
Status: DISCONTINUED | OUTPATIENT
Start: 2023-01-01 | End: 2023-01-01

## 2023-01-01 RX ORDER — HYDROCODONE BITARTRATE AND ACETAMINOPHEN 10; 325 MG/1; MG/1
1 TABLET ORAL EVERY 8 HOURS PRN
Qty: 60 TABLET | Refills: 0 | Status: SHIPPED | OUTPATIENT
Start: 2023-01-01 | End: 2023-01-01 | Stop reason: SDUPTHER

## 2023-01-01 RX ORDER — HYDROXYZINE PAMOATE 25 MG/1
25 CAPSULE ORAL EVERY 12 HOURS PRN
Qty: 60 CAPSULE | Refills: 1 | Status: SHIPPED | OUTPATIENT
Start: 2023-01-01

## 2023-01-01 RX ORDER — HYDROCODONE BITARTRATE AND ACETAMINOPHEN 10; 325 MG/1; MG/1
1 TABLET ORAL EVERY 8 HOURS PRN
Status: CANCELLED | OUTPATIENT
Start: 2023-01-01

## 2023-01-01 RX ORDER — NALOXONE HCL 0.4 MG/ML
0.02 VIAL (ML) INJECTION
Status: DISCONTINUED | OUTPATIENT
Start: 2023-01-01 | End: 2023-01-01 | Stop reason: HOSPADM

## 2023-01-01 RX ORDER — ZOLPIDEM TARTRATE 5 MG/1
10 TABLET ORAL NIGHTLY PRN
Status: CANCELLED | OUTPATIENT
Start: 2023-01-01

## 2023-01-01 RX ORDER — ONDANSETRON 2 MG/ML
4 INJECTION INTRAMUSCULAR; INTRAVENOUS
Status: DISCONTINUED | OUTPATIENT
Start: 2023-01-01 | End: 2023-01-01

## 2023-01-01 RX ORDER — SODIUM CHLORIDE, SODIUM LACTATE, POTASSIUM CHLORIDE, CALCIUM CHLORIDE 600; 310; 30; 20 MG/100ML; MG/100ML; MG/100ML; MG/100ML
INJECTION, SOLUTION INTRAVENOUS CONTINUOUS
Status: DISCONTINUED | OUTPATIENT
Start: 2023-01-01 | End: 2023-01-01

## 2023-01-01 RX ORDER — OXYBUTYNIN CHLORIDE 10 MG/1
10 TABLET, EXTENDED RELEASE ORAL
COMMUNITY
Start: 2023-01-01

## 2023-01-01 RX ORDER — FERROUS GLUCONATE 324(38)MG
324 TABLET ORAL
Qty: 90 TABLET | Refills: 3 | Status: SHIPPED | OUTPATIENT
Start: 2023-01-01 | End: 2024-08-21

## 2023-01-01 RX ORDER — CEFDINIR 300 MG/1
300 CAPSULE ORAL 2 TIMES DAILY
Qty: 10 CAPSULE | Refills: 0 | Status: SHIPPED | OUTPATIENT
Start: 2023-01-01 | End: 2023-01-01

## 2023-01-01 RX ORDER — ACETAMINOPHEN 325 MG/1
650 TABLET ORAL EVERY 6 HOURS PRN
Status: DISCONTINUED | OUTPATIENT
Start: 2023-01-01 | End: 2023-01-01

## 2023-01-01 RX ORDER — BACLOFEN 5 MG/1
5 TABLET ORAL ONCE
Status: COMPLETED | OUTPATIENT
Start: 2023-01-01 | End: 2023-01-01

## 2023-01-01 RX ADMIN — Medication 100 MCG: at 12:03

## 2023-01-01 RX ADMIN — Medication 200 MCG: at 12:03

## 2023-01-01 RX ADMIN — FENTANYL CITRATE 50 MCG: 50 INJECTION, SOLUTION INTRAMUSCULAR; INTRAVENOUS at 12:03

## 2023-01-01 RX ADMIN — FUROSEMIDE 40 MG: 40 TABLET ORAL at 09:03

## 2023-01-01 RX ADMIN — BACLOFEN 10 MG: 10 TABLET ORAL at 03:03

## 2023-01-01 RX ADMIN — ZOLPIDEM TARTRATE 10 MG: 5 TABLET ORAL at 09:03

## 2023-01-01 RX ADMIN — BACLOFEN 5 MG: 5 TABLET ORAL at 09:03

## 2023-01-01 RX ADMIN — LEVOFLOXACIN 500 MG: 500 INJECTION, SOLUTION INTRAVENOUS at 06:03

## 2023-01-01 RX ADMIN — BACLOFEN 10 MG: 10 TABLET ORAL at 09:03

## 2023-01-01 RX ADMIN — BACLOFEN 10 MG: 10 TABLET ORAL at 04:03

## 2023-01-01 RX ADMIN — SODIUM CHLORIDE, POTASSIUM CHLORIDE, SODIUM LACTATE AND CALCIUM CHLORIDE: 600; 310; 30; 20 INJECTION, SOLUTION INTRAVENOUS at 04:03

## 2023-01-01 RX ADMIN — HYDROCODONE BITARTRATE AND ACETAMINOPHEN 1 TABLET: 5; 325 TABLET ORAL at 11:03

## 2023-01-01 RX ADMIN — BACLOFEN 10 MG: 10 TABLET ORAL at 08:03

## 2023-01-01 RX ADMIN — CEFEPIME 1 G: 1 INJECTION, POWDER, FOR SOLUTION INTRAMUSCULAR; INTRAVENOUS at 08:03

## 2023-01-01 RX ADMIN — CEFTRIAXONE SODIUM 1 G: 1 INJECTION, POWDER, FOR SOLUTION INTRAMUSCULAR; INTRAVENOUS at 04:03

## 2023-01-01 RX ADMIN — HYDROCODONE BITARTRATE AND ACETAMINOPHEN 1 TABLET: 5; 325 TABLET ORAL at 04:03

## 2023-01-01 RX ADMIN — CEFEPIME 1 G: 1 INJECTION, POWDER, FOR SOLUTION INTRAMUSCULAR; INTRAVENOUS at 12:03

## 2023-01-01 RX ADMIN — CEFEPIME 1 G: 1 INJECTION, POWDER, FOR SOLUTION INTRAMUSCULAR; INTRAVENOUS at 03:03

## 2023-01-01 RX ADMIN — KETOROLAC TROMETHAMINE 15 MG: 30 INJECTION, SOLUTION INTRAMUSCULAR; INTRAVENOUS at 04:03

## 2023-01-01 RX ADMIN — SODIUM CHLORIDE, POTASSIUM CHLORIDE, SODIUM LACTATE AND CALCIUM CHLORIDE: 600; 310; 30; 20 INJECTION, SOLUTION INTRAVENOUS at 09:03

## 2023-01-01 RX ADMIN — ONDANSETRON HYDROCHLORIDE 4 MG: 2 INJECTION, SOLUTION INTRAMUSCULAR; INTRAVENOUS at 12:03

## 2023-01-01 RX ADMIN — Medication 200 MCG: at 01:03

## 2023-01-01 RX ADMIN — PROPOFOL 40 MG: 10 INJECTION, EMULSION INTRAVENOUS at 12:03

## 2023-01-01 RX ADMIN — PANTOPRAZOLE SODIUM 40 MG: 40 TABLET, DELAYED RELEASE ORAL at 09:03

## 2023-01-01 RX ADMIN — BACLOFEN 10 MG: 10 TABLET ORAL at 11:03

## 2023-01-01 RX ADMIN — SODIUM CHLORIDE, POTASSIUM CHLORIDE, SODIUM LACTATE AND CALCIUM CHLORIDE: 600; 310; 30; 20 INJECTION, SOLUTION INTRAVENOUS at 12:03

## 2023-01-01 RX ADMIN — MEROPENEM 1 G: 1 INJECTION, POWDER, FOR SOLUTION INTRAVENOUS at 10:03

## 2023-01-01 RX ADMIN — CEFEPIME 1 G: 1 INJECTION, POWDER, FOR SOLUTION INTRAMUSCULAR; INTRAVENOUS at 09:03

## 2023-01-01 RX ADMIN — POTASSIUM CHLORIDE 20 MEQ: 1500 TABLET, EXTENDED RELEASE ORAL at 06:03

## 2023-01-01 RX ADMIN — PANTOPRAZOLE SODIUM 40 MG: 40 TABLET, DELAYED RELEASE ORAL at 08:03

## 2023-01-01 RX ADMIN — FUROSEMIDE 40 MG: 40 TABLET ORAL at 06:03

## 2023-01-01 RX ADMIN — SODIUM CHLORIDE, SODIUM GLUCONATE, SODIUM ACETATE, POTASSIUM CHLORIDE AND MAGNESIUM CHLORIDE: 526; 502; 368; 37; 30 INJECTION, SOLUTION INTRAVENOUS at 12:03

## 2023-01-01 RX ADMIN — ERYTHROPOIETIN 10000 UNITS: 10000 INJECTION, SOLUTION INTRAVENOUS; SUBCUTANEOUS at 11:03

## 2023-01-01 RX ADMIN — PROPOFOL 50 MG: 10 INJECTION, EMULSION INTRAVENOUS at 12:03

## 2023-01-01 RX ADMIN — SODIUM CHLORIDE, POTASSIUM CHLORIDE, SODIUM LACTATE AND CALCIUM CHLORIDE: 600; 310; 30; 20 INJECTION, SOLUTION INTRAVENOUS at 03:03

## 2023-01-01 RX ADMIN — LIDOCAINE HYDROCHLORIDE 40 MG: 20 INJECTION, SOLUTION EPIDURAL; INFILTRATION; INTRACAUDAL; PERINEURAL at 12:03

## 2023-01-01 RX ADMIN — BACLOFEN 5 MG: 5 TABLET ORAL at 11:03

## 2023-01-01 RX ADMIN — SODIUM CHLORIDE 125 MG: 9 INJECTION, SOLUTION INTRAVENOUS at 09:03

## 2023-01-01 RX ADMIN — POTASSIUM CHLORIDE 20 MEQ: 1500 TABLET, EXTENDED RELEASE ORAL at 09:03

## 2023-01-01 RX ADMIN — SODIUM CHLORIDE 125 MG: 9 INJECTION, SOLUTION INTRAVENOUS at 11:03

## 2023-01-01 RX ADMIN — MEROPENEM 1 G: 1 INJECTION, POWDER, FOR SOLUTION INTRAVENOUS at 02:03

## 2023-01-01 RX ADMIN — VANCOMYCIN HYDROCHLORIDE 1000 MG: 1 INJECTION, POWDER, LYOPHILIZED, FOR SOLUTION INTRAVENOUS at 04:03

## 2023-01-01 RX ADMIN — HYDROMORPHONE HYDROCHLORIDE 1.5 MG: 2 INJECTION, SOLUTION INTRAMUSCULAR; INTRAVENOUS; SUBCUTANEOUS at 09:03

## 2023-01-06 RX ORDER — HYDROCODONE BITARTRATE AND ACETAMINOPHEN 10; 325 MG/1; MG/1
1 TABLET ORAL EVERY 8 HOURS PRN
Qty: 21 TABLET | Refills: 0 | Status: SHIPPED | OUTPATIENT
Start: 2023-01-06 | End: 2023-01-13 | Stop reason: SDUPTHER

## 2023-01-13 ENCOUNTER — HOSPITAL ENCOUNTER (OUTPATIENT)
Dept: WOUND CARE | Facility: HOSPITAL | Age: 48
Discharge: HOME | End: 2023-01-13
Attending: FAMILY MEDICINE
Payer: MEDICARE

## 2023-01-13 VITALS
RESPIRATION RATE: 18 BRPM | WEIGHT: 165 LBS | HEART RATE: 78 BPM | HEIGHT: 73 IN | BODY MASS INDEX: 21.87 KG/M2 | SYSTOLIC BLOOD PRESSURE: 130 MMHG | DIASTOLIC BLOOD PRESSURE: 80 MMHG

## 2023-01-13 DIAGNOSIS — L89.114 PRESSURE INJURY OF RIGHT UPPER BACK, STAGE 4: ICD-10-CM

## 2023-01-13 DIAGNOSIS — L89.154 PRESSURE INJURY OF SACRAL REGION, STAGE 4: ICD-10-CM

## 2023-01-13 DIAGNOSIS — L89.894 PRESSURE INJURY OF LEFT LEG, STAGE 4: ICD-10-CM

## 2023-01-13 DIAGNOSIS — L89.894 PRESSURE INJURY OF RIGHT LEG, STAGE 4: Primary | ICD-10-CM

## 2023-01-13 PROCEDURE — 99213 OFFICE O/P EST LOW 20 MIN: CPT

## 2023-01-13 PROCEDURE — 27000999 HC MEDICAL RECORD PHOTO DOCUMENTATION

## 2023-01-13 PROCEDURE — 17250 CHEM CAUT OF GRANLTJ TISSUE: CPT

## 2023-01-13 RX ORDER — HYDROCODONE BITARTRATE AND ACETAMINOPHEN 10; 325 MG/1; MG/1
1 TABLET ORAL EVERY 8 HOURS PRN
Qty: 63 TABLET | Refills: 0 | Status: SHIPPED | OUTPATIENT
Start: 2023-01-13 | End: 2023-02-03 | Stop reason: SDUPTHER

## 2023-01-13 NOTE — PROGRESS NOTES
"   Subjective:       Patient ID: Modesto Payton is a 47 y.o. male.    Chief Complaint: Pressure Ulcer (Right lower leg - Stage 4/Back - Stage 4/Right posterior arm - Stage 4/Right posterior thigh - Stage 4/Sacrum - Stage 4/Right elbow - stage 4 /Left posterior thigh - stage 4/Left anterior lower leg - stage 4 )    47-year-old  Am   erican male, a quadriplegia, who is here for follow up of his multiple back and posterior pressure injuries.  He has not been here since early December.  He does have some new wounds, oth erwise, the existing wounds have not deteriorated.  There is no obvious secondary infection of any of his wounds.  For the most part, we did a mechanical debridement over his wounds, along with chemical cauterization with silver nitrate.    Review of Systems   Constitutional: Negative.    Respiratory: Negative.     Cardiovascular: Negative.    Skin:         As documented in the HPI.   All other systems reviewed and are negative.      Objective:      Vitals:    01/13/23 1133   BP: 130/80   Pulse: 78   Resp: 18   Weight: 74.8 kg (165 lb)   Height: 6' 1" (1.854 m)        Physical Exam  Vitals and nursing note reviewed. Exam conducted with a chaperone present.   Constitutional:       Appearance: Normal appearance.   Cardiovascular:      Rate and Rhythm: Normal rate.   Pulmonary:      Effort: Pulmonary effort is normal.   Skin:     General: Skin is warm and dry.      Comments: The pressure ulcers on his back side appear about the same as a month ago.  These were clean, and cauterized with silver nitrate.  There was no surgical debridement necessary today.   Neurological:      Mental Status: He is alert.     Right posterior elbow    Right posterior arm    Back    Sacrum    Left posterior thigh    Right posterior thigh    Right lower leg    Left anterior lower leg       Altered Skin Integrity 05/13/22 1232 Right lower Leg #1 Other (comment) Full thickness tissue loss. Subcutaneous fat may be visible " but bone, tendon or muscle are not exposed (Active)   05/13/22 1232   Altered Skin Integrity Present on Admission: yes   Side: Right   Orientation: lower   Location: Leg   Wound Number: #1   Is this injury device related?: No   Primary Wound Type: Other   Description of Altered Skin Integrity: Full thickness tissue loss. Subcutaneous fat may be visible but bone, tendon or muscle are not exposed   Ankle-Brachial Index:    Pulses:    Removal Indication and Assessment:    Wound Outcome:    (Retired) Wound Length (cm):    (Retired) Wound Width (cm):    (Retired) Depth (cm):    Wound Description (Comments):    Removal Indications:    Dressing Appearance Moist drainage 01/13/23 1230   Drainage Amount Moderate 01/13/23 1230   Drainage Characteristics/Odor Serosanguineous 01/13/23 1230   Appearance Red;Hypergranulation;Moist 01/13/23 1230   Tissue loss description Full thickness 01/13/23 1230   Periwound Area Intact 01/13/23 1230   Wound Edges Open 01/13/23 1230   Wound Length (cm) 8.8 cm 01/13/23 1230   Wound Width (cm) 1.7 cm 01/13/23 1230   Wound Depth (cm) 0.2 cm 01/13/23 1230   Wound Volume (cm^3) 2.992 cm^3 01/13/23 1230   Wound Surface Area (cm^2) 14.96 cm^2 01/13/23 1230   Care Cleansed with:;Wound cleanser 01/13/23 1230   Dressing Applied 01/13/23 1230   Dressing Change Due 01/15/23 01/13/23 1230            Altered Skin Integrity 05/13/22 1237 Back #2 Other (comment) Full thickness tissue loss with exposed bone, tendon, or muscle. Often includes undermining and tunneling. May extend into muscle and/or supporting structures. (Active)   05/13/22 1237   Altered Skin Integrity Present on Admission: yes   Side:    Orientation:    Location: Back   Wound Number: #2   Is this injury device related?: No   Primary Wound Type: Other   Description of Altered Skin Integrity: Full thickness tissue loss with exposed bone, tendon, or muscle. Often includes undermining and tunneling. May extend into muscle and/or supporting  structures.   Ankle-Brachial Index:    Pulses:    Removal Indication and Assessment:    Wound Outcome:    (Retired) Wound Length (cm):    (Retired) Wound Width (cm):    (Retired) Depth (cm):    Wound Description (Comments):    Removal Indications:    Dressing Appearance Moist drainage 01/13/23 1230   Drainage Amount Moderate 01/13/23 1230   Drainage Characteristics/Odor Serosanguineous 01/13/23 1230   Appearance Red;Hypergranulation 01/13/23 1230   Tissue loss description Full thickness 01/13/23 1230   Periwound Area Intact 01/13/23 1230   Wound Edges Open 01/13/23 1230   Wound Length (cm) 31 cm 01/13/23 1230   Wound Width (cm) 19 cm 01/13/23 1230   Wound Depth (cm) 0.2 cm 01/13/23 1230   Wound Volume (cm^3) 117.8 cm^3 01/13/23 1230   Wound Surface Area (cm^2) 589 cm^2 01/13/23 1230   Care Cleansed with: 01/13/23 1230   Dressing Applied 01/13/23 1230   Dressing Change Due 01/15/23 01/13/23 1230            Altered Skin Integrity 05/13/22 1241 Right upper;posterior Arm #3 Other (comment) Full thickness tissue loss. Subcutaneous fat may be visible but bone, tendon or muscle are not exposed (Active)   05/13/22 1241   Altered Skin Integrity Present on Admission: yes   Side: Right   Orientation: upper;posterior   Location: Arm   Wound Number: #3   Is this injury device related?: No   Primary Wound Type: Other   Description of Altered Skin Integrity: Full thickness tissue loss. Subcutaneous fat may be visible but bone, tendon or muscle are not exposed   Ankle-Brachial Index:    Pulses:    Removal Indication and Assessment:    Wound Outcome:    (Retired) Wound Length (cm):    (Retired) Wound Width (cm):    (Retired) Depth (cm):    Wound Description (Comments):    Removal Indications:    Dressing Appearance Moist drainage 01/13/23 1230   Drainage Amount Moderate 01/13/23 1230   Drainage Characteristics/Odor Serosanguineous 01/13/23 1230   Appearance Red;Hypergranulation;Moist 01/13/23 1230   Tissue loss description Full  thickness 01/13/23 1230   Periwound Area Intact 01/13/23 1230   Wound Edges Open 01/13/23 1230   Wound Length (cm) 2.5 cm 01/13/23 1230   Wound Width (cm) 2 cm 01/13/23 1230   Wound Depth (cm) 0.2 cm 01/13/23 1230   Wound Volume (cm^3) 1 cm^3 01/13/23 1230   Wound Surface Area (cm^2) 5 cm^2 01/13/23 1230   Care Cleansed with:;Wound cleanser 01/13/23 1230   Dressing Applied 01/13/23 1230   Dressing Change Due 01/15/23 01/13/23 1230            Altered Skin Integrity 05/13/22 1247 Right posterior Thigh #4 Other (comment) Full thickness tissue loss. Subcutaneous fat may be visible but bone, tendon or muscle are not exposed (Active)   05/13/22 1247   Altered Skin Integrity Present on Admission: yes   Side: Right   Orientation: posterior   Location: Thigh   Wound Number: #4   Is this injury device related?: No   Primary Wound Type: Other   Description of Altered Skin Integrity: Full thickness tissue loss. Subcutaneous fat may be visible but bone, tendon or muscle are not exposed   Ankle-Brachial Index:    Pulses:    Removal Indication and Assessment:    Wound Outcome:    (Retired) Wound Length (cm):    (Retired) Wound Width (cm):    (Retired) Depth (cm):    Wound Description (Comments):    Removal Indications:    Dressing Appearance Moist drainage 01/13/23 1230   Drainage Amount Moderate 01/13/23 1230   Drainage Characteristics/Odor Serosanguineous 01/13/23 1230   Appearance Red;Slough;Moist 01/13/23 1230   Tissue loss description Full thickness 01/13/23 1230   Periwound Area Intact 01/13/23 1230   Wound Edges Open 01/13/23 1230   Wound Length (cm) 26 cm 01/13/23 1230   Wound Width (cm) 15 cm 01/13/23 1230   Wound Depth (cm) 0.2 cm 01/13/23 1230   Wound Volume (cm^3) 78 cm^3 01/13/23 1230   Wound Surface Area (cm^2) 390 cm^2 01/13/23 1230   Care Cleansed with:;Wound cleanser 01/13/23 1230   Dressing Applied 01/13/23 1230   Dressing Change Due 01/15/23 01/13/23 1230            Altered Skin Integrity 05/13/22 0107 Sacral  spine #6 Other (comment) Full thickness tissue loss with exposed bone, tendon, or muscle. Often includes undermining and tunneling. May extend into muscle and/or supporting structures. (Active)   05/13/22 0107   Altered Skin Integrity Present on Admission: yes   Side:    Orientation:    Location: Sacral spine   Wound Number: #6   Is this injury device related?: No   Primary Wound Type: Other   Description of Altered Skin Integrity: Full thickness tissue loss with exposed bone, tendon, or muscle. Often includes undermining and tunneling. May extend into muscle and/or supporting structures.   Ankle-Brachial Index:    Pulses:    Removal Indication and Assessment:    Wound Outcome:    (Retired) Wound Length (cm):    (Retired) Wound Width (cm):    (Retired) Depth (cm):    Wound Description (Comments):    Removal Indications:    Dressing Appearance Moist drainage 01/13/23 1230   Drainage Amount Moderate 01/13/23 1230   Drainage Characteristics/Odor Serosanguineous 01/13/23 1230   Appearance Red;Hypergranulation;Moist 01/13/23 1230   Tissue loss description Full thickness 01/13/23 1230   Periwound Area Intact 01/13/23 1230   Wound Edges Open 01/13/23 1230   Wound Length (cm) 17 cm 01/13/23 1230   Wound Width (cm) 13.5 cm 01/13/23 1230   Wound Depth (cm) 0.2 cm 01/13/23 1230   Wound Volume (cm^3) 45.9 cm^3 01/13/23 1230   Wound Surface Area (cm^2) 229.5 cm^2 01/13/23 1230   Care Cleansed with:;Wound cleanser 01/13/23 1230   Dressing Applied 01/13/23 1230   Dressing Change Due 01/15/23 01/13/23 1230            Altered Skin Integrity 11/04/22 1203 Left anterior;lower Leg #5 Other (comment) (Active)   11/04/22 1203   Altered Skin Integrity Present on Admission: yes   Side: Left   Orientation: anterior;lower   Location: Leg   Wound Number: #5   Is this injury device related?: No   Primary Wound Type: Other   Description of Altered Skin Integrity:    Ankle-Brachial Index:    Pulses:    Removal Indication and Assessment:    Wound  Outcome:    (Retired) Wound Length (cm):    (Retired) Wound Width (cm):    (Retired) Depth (cm):    Wound Description (Comments):    Removal Indications:    Dressing Appearance Moist drainage 01/13/23 1230   Drainage Amount Moderate 01/13/23 1230   Drainage Characteristics/Odor Serosanguineous 01/13/23 1230   Appearance Red;Hypergranulation;Moist 01/13/23 1230   Tissue loss description Full thickness 01/13/23 1230   Periwound Area Intact 01/13/23 1230   Wound Edges Open 01/13/23 1230   Wound Length (cm) 4.5 cm 01/13/23 1230   Wound Width (cm) 4.5 cm 01/13/23 1230   Wound Depth (cm) 0.2 cm 01/13/23 1230   Wound Volume (cm^3) 4.05 cm^3 01/13/23 1230   Wound Surface Area (cm^2) 20.25 cm^2 01/13/23 1230   Care Cleansed with:;Wound cleanser 01/13/23 1230   Dressing Applied 01/13/23 1230   Dressing Change Due 01/15/23 01/13/23 1230            Altered Skin Integrity 12/02/22 1336 Right Elbow #7 (Active)   12/02/22 1336   Altered Skin Integrity Present on Admission: yes   Side: Right   Orientation:    Location: Elbow   Wound Number: #7   Is this injury device related?: No   Primary Wound Type:    Description of Altered Skin Integrity:    Ankle-Brachial Index:    Pulses:    Removal Indication and Assessment:    Wound Outcome:    (Retired) Wound Length (cm):    (Retired) Wound Width (cm):    (Retired) Depth (cm):    Wound Description (Comments):    Removal Indications:    Dressing Appearance Moist drainage 01/13/23 1230   Drainage Amount Moderate 01/13/23 1230   Drainage Characteristics/Odor Serosanguineous;Malodorous 01/13/23 1230   Appearance Red;Hypergranulation;Moist 01/13/23 1230   Tissue loss description Full thickness 01/13/23 1230   Periwound Area Intact 01/13/23 1230   Wound Edges Open 01/13/23 1230   Wound Length (cm) 2.3 cm 01/13/23 1230   Wound Width (cm) 3.5 cm 01/13/23 1230   Wound Depth (cm) 0.2 cm 01/13/23 1230   Wound Volume (cm^3) 1.61 cm^3 01/13/23 1230   Wound Surface Area (cm^2) 8.05 cm^2 01/13/23 1230    Care Cleansed with:;Wound cleanser 01/13/23 1230   Dressing Applied 01/13/23 1230   Dressing Change Due 01/15/23 01/13/23 1230            Altered Skin Integrity 12/02/22 1337 Left posterior Thigh #8 (Active)   12/02/22 1337   Altered Skin Integrity Present on Admission: yes   Side: Left   Orientation: posterior   Location: Thigh   Wound Number: #8   Is this injury device related?: No   Primary Wound Type:    Description of Altered Skin Integrity:    Ankle-Brachial Index:    Pulses:    Removal Indication and Assessment:    Wound Outcome:    (Retired) Wound Length (cm):    (Retired) Wound Width (cm):    (Retired) Depth (cm):    Wound Description (Comments):    Removal Indications:    Dressing Appearance Moist drainage 01/13/23 1230   Drainage Amount Moderate 01/13/23 1230   Drainage Characteristics/Odor Serosanguineous 01/13/23 1230   Appearance Red;Hypergranulation;Moist 01/13/23 1230   Tissue loss description Full thickness 01/13/23 1230   Periwound Area Intact 01/13/23 1230   Wound Edges Open 01/13/23 1230   Wound Length (cm) 17 cm 01/13/23 1230   Wound Width (cm) 13.5 cm 01/13/23 1230   Wound Depth (cm) 0.2 cm 01/13/23 1230   Wound Volume (cm^3) 45.9 cm^3 01/13/23 1230   Wound Surface Area (cm^2) 229.5 cm^2 01/13/23 1230   Care Cleansed with:;Wound cleanser 01/13/23 1230   Dressing Applied 01/13/23 1230   Dressing Change Due 01/15/23 01/13/23 1230         Assessment:         ICD-10-CM ICD-9-CM   1. Pressure injury of right leg, stage 4  L89.894 707.09     707.24   2. Pressure injury of sacral region, stage 4  L89.154 707.03     707.24   3. Pressure injury of left leg, stage 4  L89.894 707.09     707.24   4. Pressure injury of right upper back, stage 4  L89.114 707.02     707.24         Procedures:   Excisional debridement performed:  [] Yes [x] No   Selective debridement performed:  [] Yes [x] No   Mechanical debridement performed:  [x] Yes [] No   Silver nitrate applied :    [x] Yes [] No   Labs ordered this  visit:   [] Yes [x] No   Imaging ordered this visit:   [] Yes [x] No   Tissue pathology and/or culture taken:  [] Yes [x] No     Procedures:  HOME HEALTH AGENCY: none  TIMES PER WEEK/DAYS:  ORDERS:  Right lower leg wound: Cleanse with wound cleanser, apply xeroform, 6x6 gentle foam border to be changed daily  Back wound: Cleanse with wound cleanser, apply xeroform, ABD pad, tape to be changed daily  Right posterior upper arm wound: Cleanse with wound cleanser, apply xeroform, 6x6 gentle foam border to be changed daily  Right posterior thigh wound: Cleanse with wound cleanser, apply xeroform, ABD pad, tape to be changed daily  Sacral wound: Cleanse with wound cleanser, apply xeroform, ABD pad, tape to be changed daily  Left anterior lower leg wound: Cleanse with wound cleanser, apply xeroform, gentle foam border to be changed daily  Left posterior thigh wound: Cleanse with wound cleanser and apply xeroform to the wound bed, cover with a gentle foam border dressing, change daily.  Right elbow wound: Cleanse with wound cleanser and apply xeroform to the wound bed, cover with a gentle foam border dressing, change daily.   Left anterior lower leg: Cleanse with wound cleanser and apply xeroform to the wound bed, cover with a gentle foam border dressing, change daily.     Patient Orders:  Hydrocodone 10 mg, number [id number]    Returned to the clinic in 3 weeks.

## 2023-01-19 ENCOUNTER — HOSPITAL ENCOUNTER (INPATIENT)
Facility: HOSPITAL | Age: 48
LOS: 7 days | Discharge: HOME HEALTH | DRG: 698 | End: 2023-01-27
Attending: FAMILY MEDICINE | Admitting: EMERGENCY MEDICINE
Payer: MEDICARE

## 2023-01-19 ENCOUNTER — TELEPHONE (OUTPATIENT)
Dept: FAMILY MEDICINE | Facility: CLINIC | Age: 48
End: 2023-01-19
Payer: MEDICARE

## 2023-01-19 DIAGNOSIS — A41.9 SEVERE SEPSIS: Primary | ICD-10-CM

## 2023-01-19 DIAGNOSIS — R07.9 CHEST PAIN: ICD-10-CM

## 2023-01-19 DIAGNOSIS — N30.01 ACUTE CYSTITIS WITH HEMATURIA: ICD-10-CM

## 2023-01-19 DIAGNOSIS — E87.5 ACUTE HYPERKALEMIA: ICD-10-CM

## 2023-01-19 DIAGNOSIS — L89.90 PRESSURE INJURY OF SKIN, UNSPECIFIED INJURY STAGE, UNSPECIFIED LOCATION: ICD-10-CM

## 2023-01-19 DIAGNOSIS — R00.0 TACHYCARDIA: ICD-10-CM

## 2023-01-19 DIAGNOSIS — R65.20 SEVERE SEPSIS: Primary | ICD-10-CM

## 2023-01-19 DIAGNOSIS — N17.9 AKI (ACUTE KIDNEY INJURY): ICD-10-CM

## 2023-01-19 DIAGNOSIS — E87.20 LACTIC ACIDOSIS: ICD-10-CM

## 2023-01-19 DIAGNOSIS — G82.20 PARAPLEGIA, UNSPECIFIED: ICD-10-CM

## 2023-01-19 DIAGNOSIS — N13.9 URINARY OBSTRUCTION: ICD-10-CM

## 2023-01-19 DIAGNOSIS — K92.0 HEMATEMESIS WITHOUT NAUSEA: ICD-10-CM

## 2023-01-19 LAB
ABS NEUT CALC (OHS): 41.4 X10(3)/MCL (ref 2.1–9.2)
ALBUMIN SERPL-MCNC: 3.1 G/DL (ref 3.5–5)
ALBUMIN/GLOB SERPL: 0.6 RATIO (ref 1.1–2)
ALP SERPL-CCNC: 99 UNIT/L (ref 40–150)
ALT SERPL-CCNC: 11 UNIT/L (ref 0–55)
ANISOCYTOSIS BLD QL SMEAR: ABNORMAL
APTT PPP: 47.9 SECONDS (ref 23.4–33.9)
AST SERPL-CCNC: 17 UNIT/L (ref 5–34)
BILIRUBIN DIRECT+TOT PNL SERPL-MCNC: 1.2 MG/DL
BUN SERPL-MCNC: 39.6 MG/DL (ref 8.9–20.6)
CALCIUM SERPL-MCNC: 9.1 MG/DL (ref 8.4–10.2)
CHLORIDE SERPL-SCNC: 102 MMOL/L (ref 98–107)
CO2 SERPL-SCNC: 19 MMOL/L (ref 22–29)
CREAT SERPL-MCNC: 3.1 MG/DL (ref 0.73–1.18)
ELLIPTOCYTOSIS (OHS): ABNORMAL
ERYTHROCYTE [DISTWIDTH] IN BLOOD BY AUTOMATED COUNT: 20.7 % (ref 11.5–17)
GFR SERPLBLD CREATININE-BSD FMLA CKD-EPI: 24 MLS/MIN/1.73/M2
GLOBULIN SER-MCNC: 5.4 GM/DL (ref 2.4–3.5)
GLUCOSE SERPL-MCNC: 118 MG/DL (ref 74–100)
HCT VFR BLD AUTO: 43.9 % (ref 42–52)
HGB BLD-MCNC: 13.5 GM/DL (ref 14–18)
HYPOSEGMENTED NEUTROPHILS (OHS): ABNORMAL
INR BLD: 1.49 (ref 2–3)
LACTATE SERPL-SCNC: 4.2 MMOL/L (ref 0.5–2.2)
LIPASE SERPL-CCNC: 4 U/L
LYMPH ABN # BLD MANUAL: 1 %
LYMPHOCYTES NFR BLD MANUAL: 1.84 X10(3)/MCL
LYMPHOCYTES NFR BLD MANUAL: 4 % (ref 13–40)
MAGNESIUM SERPL-MCNC: 3.5 MG/DL (ref 1.6–2.6)
MCH RBC QN AUTO: 20.3 PG
MCHC RBC AUTO-ENTMCNC: 30.8 MG/DL (ref 33–36)
MCV RBC AUTO: 65.9 FL (ref 80–94)
METAMYELOCYTES NFR BLD MANUAL: 2 %
MICROCYTES BLD QL SMEAR: ABNORMAL
MONOCYTES NFR BLD MANUAL: 2.3 X10(3)/MCL (ref 0.1–1.3)
MONOCYTES NFR BLD MANUAL: 5 % (ref 2–11)
NEUTROPHILS NFR BLD MANUAL: 82 % (ref 47–80)
NEUTS BAND NFR BLD MANUAL: 6 % (ref 0–11)
OVALOCYTES (OLG): ABNORMAL
PLATELET # BLD AUTO: 266 X10(3)/MCL (ref 130–400)
PLATELET # BLD EST: ADEQUATE 10*3/UL
PMV BLD AUTO: ABNORMAL FL
POIKILOCYTOSIS BLD QL SMEAR: ABNORMAL
POTASSIUM SERPL-SCNC: 5.9 MMOL/L (ref 3.5–5.1)
PROT SERPL-MCNC: 8.5 GM/DL (ref 6.4–8.3)
PROTHROMBIN TIME: 17.6 SECONDS (ref 11.7–14.5)
RBC # BLD AUTO: 6.66 X10(6)/MCL (ref 4.7–6.1)
RBC MORPH BLD: ABNORMAL
SCHISTOCYTE (OLG): ABNORMAL
SODIUM SERPL-SCNC: 136 MMOL/L (ref 136–145)
TEAR DROP CELL (OLG): ABNORMAL
WBC # SPEC AUTO: 46 X10(3)/MCL (ref 4.5–11.5)
WBC VACUOLES (OHS): ABNORMAL

## 2023-01-19 PROCEDURE — 25000003 PHARM REV CODE 250: Performed by: STUDENT IN AN ORGANIZED HEALTH CARE EDUCATION/TRAINING PROGRAM

## 2023-01-19 PROCEDURE — 99291 CRITICAL CARE FIRST HOUR: CPT | Mod: 25

## 2023-01-19 PROCEDURE — 96360 HYDRATION IV INFUSION INIT: CPT

## 2023-01-19 PROCEDURE — 83735 ASSAY OF MAGNESIUM: CPT | Performed by: FAMILY MEDICINE

## 2023-01-19 PROCEDURE — 25000003 PHARM REV CODE 250: Performed by: FAMILY MEDICINE

## 2023-01-19 PROCEDURE — 83605 ASSAY OF LACTIC ACID: CPT | Performed by: FAMILY MEDICINE

## 2023-01-19 PROCEDURE — 93010 ELECTROCARDIOGRAM REPORT: CPT | Mod: ,,, | Performed by: INTERNAL MEDICINE

## 2023-01-19 PROCEDURE — 93005 ELECTROCARDIOGRAM TRACING: CPT

## 2023-01-19 PROCEDURE — 85730 THROMBOPLASTIN TIME PARTIAL: CPT | Performed by: FAMILY MEDICINE

## 2023-01-19 PROCEDURE — 83690 ASSAY OF LIPASE: CPT | Performed by: FAMILY MEDICINE

## 2023-01-19 PROCEDURE — 80053 COMPREHEN METABOLIC PANEL: CPT | Performed by: FAMILY MEDICINE

## 2023-01-19 PROCEDURE — 36410 VNPNXR 3YR/> PHY/QHP DX/THER: CPT

## 2023-01-19 PROCEDURE — C1751 CATH, INF, PER/CENT/MIDLINE: HCPCS

## 2023-01-19 PROCEDURE — 93010 EKG 12-LEAD: ICD-10-PCS | Mod: ,,, | Performed by: INTERNAL MEDICINE

## 2023-01-19 PROCEDURE — 96361 HYDRATE IV INFUSION ADD-ON: CPT

## 2023-01-19 PROCEDURE — 81003 URINALYSIS AUTO W/O SCOPE: CPT | Performed by: FAMILY MEDICINE

## 2023-01-19 PROCEDURE — S0073 INJECTION, AZTREONAM, 500 MG: HCPCS | Performed by: STUDENT IN AN ORGANIZED HEALTH CARE EDUCATION/TRAINING PROGRAM

## 2023-01-19 PROCEDURE — 87088 URINE BACTERIA CULTURE: CPT | Performed by: FAMILY MEDICINE

## 2023-01-19 PROCEDURE — 85610 PROTHROMBIN TIME: CPT | Performed by: FAMILY MEDICINE

## 2023-01-19 PROCEDURE — 85027 COMPLETE CBC AUTOMATED: CPT | Performed by: FAMILY MEDICINE

## 2023-01-19 PROCEDURE — 63600175 PHARM REV CODE 636 W HCPCS: Performed by: STUDENT IN AN ORGANIZED HEALTH CARE EDUCATION/TRAINING PROGRAM

## 2023-01-19 PROCEDURE — 96365 THER/PROPH/DIAG IV INF INIT: CPT

## 2023-01-19 PROCEDURE — 85025 COMPLETE CBC W/AUTO DIFF WBC: CPT | Performed by: FAMILY MEDICINE

## 2023-01-19 PROCEDURE — 87040 BLOOD CULTURE FOR BACTERIA: CPT | Performed by: FAMILY MEDICINE

## 2023-01-19 RX ORDER — SODIUM CHLORIDE 0.9 % (FLUSH) 0.9 %
10 SYRINGE (ML) INJECTION
Status: DISCONTINUED | OUTPATIENT
Start: 2023-01-19 | End: 2023-01-27 | Stop reason: HOSPADM

## 2023-01-19 RX ORDER — LIDOCAINE HYDROCHLORIDE 20 MG/ML
JELLY TOPICAL
Status: COMPLETED | OUTPATIENT
Start: 2023-01-19 | End: 2023-01-19

## 2023-01-19 RX ORDER — SODIUM CHLORIDE 0.9 % (FLUSH) 0.9 %
10 SYRINGE (ML) INJECTION EVERY 6 HOURS
Status: DISCONTINUED | OUTPATIENT
Start: 2023-01-20 | End: 2023-01-27 | Stop reason: HOSPADM

## 2023-01-19 RX ADMIN — SODIUM CHLORIDE, POTASSIUM CHLORIDE, SODIUM LACTATE AND CALCIUM CHLORIDE 1000 ML: 600; 310; 30; 20 INJECTION, SOLUTION INTRAVENOUS at 10:01

## 2023-01-19 RX ADMIN — SODIUM CHLORIDE 1000 ML: 9 INJECTION, SOLUTION INTRAVENOUS at 07:01

## 2023-01-19 RX ADMIN — SODIUM CHLORIDE, POTASSIUM CHLORIDE, SODIUM LACTATE AND CALCIUM CHLORIDE 1000 ML: 600; 310; 30; 20 INJECTION, SOLUTION INTRAVENOUS at 11:01

## 2023-01-19 RX ADMIN — AZTREONAM 1000 MG: 1 INJECTION, POWDER, LYOPHILIZED, FOR SOLUTION INTRAMUSCULAR; INTRAVENOUS at 09:01

## 2023-01-19 RX ADMIN — LIDOCAINE HYDROCHLORIDE: 20 JELLY TOPICAL at 11:01

## 2023-01-19 NOTE — TELEPHONE ENCOUNTER
----- Message from Meliza Bergman sent at 1/19/2023  2:19 PM CST -----  Regarding: med advice  .Type:  Needs Medical Advice    Who Called: Pt's sister Leonor  Symptoms (please be specific): Constipated, little urine output, shoulder pain, sweats   How long has patient had these symptoms:    Pharmacy name and phone #:    Would the patient rather a call back or a response via [facility]? Call back  Best Call Back Number: [phone]  Additional Information: Pt called to speak with nurse, nurse directed pt to go to ER. Sister would still like for a nurse to get in contact with her.

## 2023-01-19 NOTE — TELEPHONE ENCOUNTER
Spoke with pts sister about the concerns and advised her to take pt to ER if he does not void within the next few hours

## 2023-01-20 LAB
ABS NEUT (OLG): 30.17 X10(3)/MCL (ref 2.1–9.2)
ACANTHOCYTES (OLG): ABNORMAL
ALBUMIN SERPL-MCNC: 2.6 G/DL (ref 3.5–5)
ALBUMIN/GLOB SERPL: 0.6 RATIO (ref 1.1–2)
ALP SERPL-CCNC: 107 UNIT/L (ref 40–150)
ALT SERPL-CCNC: 11 UNIT/L (ref 0–55)
ANION GAP SERPL CALC-SCNC: 13 MEQ/L
ANISOCYTOSIS BLD QL SMEAR: ABNORMAL
APPEARANCE UR: ABNORMAL
AST SERPL-CCNC: 18 UNIT/L (ref 5–34)
BACTERIA #/AREA URNS AUTO: ABNORMAL /HPF
BILIRUB UR QL STRIP.AUTO: ABNORMAL MG/DL
BILIRUBIN DIRECT+TOT PNL SERPL-MCNC: 1.5 MG/DL
BUN SERPL-MCNC: 35.4 MG/DL (ref 8.9–20.6)
BUN SERPL-MCNC: 38.5 MG/DL (ref 8.9–20.6)
BURR CELLS (OLG): ABNORMAL
CALCIUM SERPL-MCNC: 8.3 MG/DL (ref 8.4–10.2)
CALCIUM SERPL-MCNC: 8.4 MG/DL (ref 8.4–10.2)
CHLORIDE SERPL-SCNC: 100 MMOL/L (ref 98–107)
CHLORIDE SERPL-SCNC: 102 MMOL/L (ref 98–107)
CO2 SERPL-SCNC: 18 MMOL/L (ref 22–29)
CO2 SERPL-SCNC: 21 MMOL/L (ref 22–29)
COLOR UR AUTO: ABNORMAL
CREAT SERPL-MCNC: 2.09 MG/DL (ref 0.73–1.18)
CREAT SERPL-MCNC: 2.55 MG/DL (ref 0.73–1.18)
CREAT/UREA NIT SERPL: 15
ERYTHROCYTE [DISTWIDTH] IN BLOOD BY AUTOMATED COUNT: 20.8 % (ref 11.5–17)
GFR SERPLBLD CREATININE-BSD FMLA CKD-EPI: 31 MLS/MIN/1.73/M2
GFR SERPLBLD CREATININE-BSD FMLA CKD-EPI: 39 MLS/MIN/1.73/M2
GLOBULIN SER-MCNC: 4.1 GM/DL (ref 2.4–3.5)
GLUCOSE SERPL-MCNC: 115 MG/DL (ref 74–100)
GLUCOSE SERPL-MCNC: 134 MG/DL (ref 74–100)
GLUCOSE UR QL STRIP.AUTO: NEGATIVE MG/DL
HCT VFR BLD AUTO: 33.5 % (ref 42–52)
HCT VFR BLD AUTO: 42.1 % (ref 42–52)
HGB BLD-MCNC: 10.6 GM/DL (ref 14–18)
HGB BLD-MCNC: 12.9 GM/DL (ref 14–18)
IMM GRANULOCYTES # BLD AUTO: 0.62 X10(3)/MCL (ref 0–0.04)
IMM GRANULOCYTES NFR BLD AUTO: 1.8 %
INSTRUMENT WBC (OLG): 33.9 X10(3)/MCL
KETONES UR QL STRIP.AUTO: NEGATIVE MG/DL
LACTATE SERPL-SCNC: 2 MMOL/L (ref 0.5–2.2)
LACTATE SERPL-SCNC: 2.4 MMOL/L (ref 0.5–2.2)
LACTATE SERPL-SCNC: 2.4 MMOL/L (ref 0.5–2.2)
LACTATE SERPL-SCNC: 2.6 MMOL/L (ref 0.5–2.2)
LACTATE SERPL-SCNC: 2.8 MMOL/L (ref 0.5–2.2)
LEUKOCYTE ESTERASE UR QL STRIP.AUTO: ABNORMAL UNIT/L
LYMPHOCYTES NFR BLD MANUAL: 2.03 X10(3)/MCL
LYMPHOCYTES NFR BLD MANUAL: 6 %
MAGNESIUM SERPL-MCNC: 3.5 MG/DL (ref 1.6–2.6)
MCH RBC QN AUTO: 20 PG
MCHC RBC AUTO-ENTMCNC: 30.6 MG/DL (ref 33–36)
MCV RBC AUTO: 65.3 FL (ref 80–94)
METAMYELOCYTES NFR BLD MANUAL: 1 %
MICROCYTES BLD QL SMEAR: ABNORMAL
MONOCYTES NFR BLD MANUAL: 1.7 X10(3)/MCL (ref 0.1–1.3)
MONOCYTES NFR BLD MANUAL: 5 %
NEUTROPHILS NFR BLD MANUAL: 88 %
NITRITE UR QL STRIP.AUTO: POSITIVE
NRBC BLD AUTO-RTO: 0 %
PH UR STRIP.AUTO: 6.5 [PH]
PHOSPHATE SERPL-MCNC: 3.9 MG/DL (ref 2.3–4.7)
PLATELET # BLD AUTO: 239 X10(3)/MCL (ref 130–400)
PLATELET # BLD EST: NORMAL 10*3/UL
PMV BLD AUTO: ABNORMAL FL
POIKILOCYTOSIS BLD QL SMEAR: ABNORMAL
POTASSIUM SERPL-SCNC: 4.5 MMOL/L (ref 3.5–5.1)
POTASSIUM SERPL-SCNC: 5.1 MMOL/L (ref 3.5–5.1)
PROT SERPL-MCNC: 6.7 GM/DL (ref 6.4–8.3)
PROT UR QL STRIP.AUTO: ABNORMAL MG/DL
RBC # BLD AUTO: 6.45 X10(6)/MCL (ref 4.7–6.1)
RBC #/AREA URNS AUTO: ABNORMAL /HPF
RBC MORPH BLD: ABNORMAL
RBC UR QL AUTO: ABNORMAL UNIT/L
SICKLE CELLS BLD QL SMEAR: ABNORMAL
SODIUM SERPL-SCNC: 132 MMOL/L (ref 136–145)
SODIUM SERPL-SCNC: 133 MMOL/L (ref 136–145)
SP GR UR STRIP.AUTO: 1.02 (ref 1–1.03)
SQUAMOUS #/AREA URNS AUTO: ABNORMAL /HPF
UROBILINOGEN UR STRIP-ACNC: 0.2 MG/DL
VANCOMYCIN SERPL-MCNC: 12.5 UG/ML (ref 15–20)
WBC # SPEC AUTO: 33.9 X10(3)/MCL (ref 4.5–11.5)
WBC #/AREA URNS AUTO: >=100 /HPF

## 2023-01-20 PROCEDURE — 80048 BASIC METABOLIC PNL TOTAL CA: CPT | Performed by: EMERGENCY MEDICINE

## 2023-01-20 PROCEDURE — 83605 ASSAY OF LACTIC ACID: CPT | Performed by: FAMILY MEDICINE

## 2023-01-20 PROCEDURE — 25000003 PHARM REV CODE 250: Performed by: EMERGENCY MEDICINE

## 2023-01-20 PROCEDURE — 11000001 HC ACUTE MED/SURG PRIVATE ROOM

## 2023-01-20 PROCEDURE — 85027 COMPLETE CBC AUTOMATED: CPT | Performed by: NURSE PRACTITIONER

## 2023-01-20 PROCEDURE — 25000003 PHARM REV CODE 250: Performed by: STUDENT IN AN ORGANIZED HEALTH CARE EDUCATION/TRAINING PROGRAM

## 2023-01-20 PROCEDURE — S0073 INJECTION, AZTREONAM, 500 MG: HCPCS | Performed by: EMERGENCY MEDICINE

## 2023-01-20 PROCEDURE — 80202 ASSAY OF VANCOMYCIN: CPT | Performed by: EMERGENCY MEDICINE

## 2023-01-20 PROCEDURE — C9113 INJ PANTOPRAZOLE SODIUM, VIA: HCPCS | Performed by: HOSPITALIST

## 2023-01-20 PROCEDURE — 84100 ASSAY OF PHOSPHORUS: CPT | Performed by: NURSE PRACTITIONER

## 2023-01-20 PROCEDURE — 63600175 PHARM REV CODE 636 W HCPCS: Performed by: EMERGENCY MEDICINE

## 2023-01-20 PROCEDURE — 83735 ASSAY OF MAGNESIUM: CPT | Performed by: NURSE PRACTITIONER

## 2023-01-20 PROCEDURE — 25000003 PHARM REV CODE 250: Performed by: INTERNAL MEDICINE

## 2023-01-20 PROCEDURE — 21400001 HC TELEMETRY ROOM

## 2023-01-20 PROCEDURE — 63600175 PHARM REV CODE 636 W HCPCS: Performed by: UROLOGY

## 2023-01-20 PROCEDURE — 63600175 PHARM REV CODE 636 W HCPCS: Performed by: NURSE PRACTITIONER

## 2023-01-20 PROCEDURE — 85014 HEMATOCRIT: CPT | Performed by: HOSPITALIST

## 2023-01-20 PROCEDURE — A4216 STERILE WATER/SALINE, 10 ML: HCPCS | Performed by: STUDENT IN AN ORGANIZED HEALTH CARE EDUCATION/TRAINING PROGRAM

## 2023-01-20 PROCEDURE — 80053 COMPREHEN METABOLIC PANEL: CPT | Performed by: NURSE PRACTITIONER

## 2023-01-20 PROCEDURE — 63600175 PHARM REV CODE 636 W HCPCS: Performed by: HOSPITALIST

## 2023-01-20 PROCEDURE — A4216 STERILE WATER/SALINE, 10 ML: HCPCS | Performed by: EMERGENCY MEDICINE

## 2023-01-20 RX ORDER — MUPIROCIN 20 MG/G
OINTMENT TOPICAL 2 TIMES DAILY
Status: COMPLETED | OUTPATIENT
Start: 2023-01-20 | End: 2023-01-24

## 2023-01-20 RX ORDER — SIMETHICONE 80 MG
1 TABLET,CHEWABLE ORAL 4 TIMES DAILY PRN
Status: DISCONTINUED | OUTPATIENT
Start: 2023-01-20 | End: 2023-01-27 | Stop reason: HOSPADM

## 2023-01-20 RX ORDER — MAG HYDROX/ALUMINUM HYD/SIMETH 200-200-20
30 SUSPENSION, ORAL (FINAL DOSE FORM) ORAL 4 TIMES DAILY PRN
Status: DISCONTINUED | OUTPATIENT
Start: 2023-01-20 | End: 2023-01-27 | Stop reason: HOSPADM

## 2023-01-20 RX ORDER — CALCIUM GLUCONATE 20 MG/ML
1 INJECTION, SOLUTION INTRAVENOUS ONCE
Status: COMPLETED | OUTPATIENT
Start: 2023-01-20 | End: 2023-01-20

## 2023-01-20 RX ORDER — SODIUM CHLORIDE, SODIUM LACTATE, POTASSIUM CHLORIDE, CALCIUM CHLORIDE 600; 310; 30; 20 MG/100ML; MG/100ML; MG/100ML; MG/100ML
INJECTION, SOLUTION INTRAVENOUS CONTINUOUS
Status: DISCONTINUED | OUTPATIENT
Start: 2023-01-20 | End: 2023-01-22

## 2023-01-20 RX ORDER — NALOXONE HCL 0.4 MG/ML
0.02 VIAL (ML) INJECTION
Status: DISCONTINUED | OUTPATIENT
Start: 2023-01-20 | End: 2023-01-27 | Stop reason: HOSPADM

## 2023-01-20 RX ORDER — POLYETHYLENE GLYCOL 3350 17 G/17G
17 POWDER, FOR SOLUTION ORAL 2 TIMES DAILY PRN
Status: DISCONTINUED | OUTPATIENT
Start: 2023-01-20 | End: 2023-01-27 | Stop reason: HOSPADM

## 2023-01-20 RX ORDER — VANCOMYCIN HCL IN 5 % DEXTROSE 1G/250ML
1000 PLASTIC BAG, INJECTION (ML) INTRAVENOUS ONCE
Status: COMPLETED | OUTPATIENT
Start: 2023-01-20 | End: 2023-01-20

## 2023-01-20 RX ORDER — PROCHLORPERAZINE EDISYLATE 5 MG/ML
5 INJECTION INTRAMUSCULAR; INTRAVENOUS EVERY 6 HOURS PRN
Status: DISCONTINUED | OUTPATIENT
Start: 2023-01-20 | End: 2023-01-27 | Stop reason: HOSPADM

## 2023-01-20 RX ORDER — PANTOPRAZOLE SODIUM 40 MG/10ML
40 INJECTION, POWDER, LYOPHILIZED, FOR SOLUTION INTRAVENOUS 2 TIMES DAILY
Status: DISCONTINUED | OUTPATIENT
Start: 2023-01-20 | End: 2023-01-24

## 2023-01-20 RX ORDER — ACETAMINOPHEN 325 MG/1
650 TABLET ORAL EVERY 6 HOURS PRN
Status: DISCONTINUED | OUTPATIENT
Start: 2023-01-20 | End: 2023-01-27 | Stop reason: HOSPADM

## 2023-01-20 RX ORDER — ONDANSETRON 2 MG/ML
4 INJECTION INTRAMUSCULAR; INTRAVENOUS EVERY 4 HOURS PRN
Status: DISCONTINUED | OUTPATIENT
Start: 2023-01-20 | End: 2023-01-27 | Stop reason: HOSPADM

## 2023-01-20 RX ORDER — TALC
6 POWDER (GRAM) TOPICAL NIGHTLY PRN
Status: DISCONTINUED | OUTPATIENT
Start: 2023-01-20 | End: 2023-01-27 | Stop reason: HOSPADM

## 2023-01-20 RX ORDER — HYDROMORPHONE HYDROCHLORIDE 2 MG/ML
0.5 INJECTION, SOLUTION INTRAMUSCULAR; INTRAVENOUS; SUBCUTANEOUS
Status: COMPLETED | OUTPATIENT
Start: 2023-01-20 | End: 2023-01-20

## 2023-01-20 RX ADMIN — PANTOPRAZOLE SODIUM 40 MG: 40 INJECTION, POWDER, LYOPHILIZED, FOR SOLUTION INTRAVENOUS at 10:01

## 2023-01-20 RX ADMIN — MUPIROCIN: 20 OINTMENT TOPICAL at 09:01

## 2023-01-20 RX ADMIN — MEROPENEM 1 G: 1 INJECTION, POWDER, FOR SOLUTION INTRAVENOUS at 12:01

## 2023-01-20 RX ADMIN — AZTREONAM 1000 MG: 1 INJECTION, POWDER, LYOPHILIZED, FOR SOLUTION INTRAMUSCULAR; INTRAVENOUS at 05:01

## 2023-01-20 RX ADMIN — SODIUM CHLORIDE, PRESERVATIVE FREE 10 ML: 5 INJECTION INTRAVENOUS at 12:01

## 2023-01-20 RX ADMIN — SODIUM CHLORIDE, PRESERVATIVE FREE 10 ML: 5 INJECTION INTRAVENOUS at 06:01

## 2023-01-20 RX ADMIN — VANCOMYCIN HYDROCHLORIDE 1000 MG: 1 INJECTION, POWDER, LYOPHILIZED, FOR SOLUTION INTRAVENOUS at 02:01

## 2023-01-20 RX ADMIN — HYDROMORPHONE HYDROCHLORIDE 0.5 MG: 2 INJECTION, SOLUTION INTRAMUSCULAR; INTRAVENOUS; SUBCUTANEOUS at 02:01

## 2023-01-20 RX ADMIN — SODIUM CHLORIDE, PRESERVATIVE FREE 10 ML: 5 INJECTION INTRAVENOUS at 05:01

## 2023-01-20 RX ADMIN — CALCIUM GLUCONATE 1 G: 20 INJECTION, SOLUTION INTRAVENOUS at 01:01

## 2023-01-20 RX ADMIN — ONDANSETRON 4 MG: 2 INJECTION INTRAMUSCULAR; INTRAVENOUS at 03:01

## 2023-01-20 RX ADMIN — SODIUM CHLORIDE, POTASSIUM CHLORIDE, SODIUM LACTATE AND CALCIUM CHLORIDE: 600; 310; 30; 20 INJECTION, SOLUTION INTRAVENOUS at 05:01

## 2023-01-20 RX ADMIN — AZTREONAM 1000 MG: 1 INJECTION, POWDER, LYOPHILIZED, FOR SOLUTION INTRAMUSCULAR; INTRAVENOUS at 02:01

## 2023-01-20 RX ADMIN — AZTREONAM 1000 MG: 1 INJECTION, POWDER, LYOPHILIZED, FOR SOLUTION INTRAMUSCULAR; INTRAVENOUS at 10:01

## 2023-01-20 RX ADMIN — PROCHLORPERAZINE EDISYLATE 5 MG: 5 INJECTION INTRAMUSCULAR; INTRAVENOUS at 06:01

## 2023-01-20 RX ADMIN — MEROPENEM 1 G: 1 INJECTION, POWDER, FOR SOLUTION INTRAVENOUS at 04:01
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
## 2023-01-20 NOTE — PROGRESS NOTES
Pharmacokinetic Assessment Follow Up: IV Vancomycin    Vancomycin serum concentration assessment(s):    The random level was drawn correctly and can be used to guide therapy at this time. The measurement is below the desired definitive target range of 15 to 20 mcg/mL.    Vancomycin Regimen Plan:    Change regimen to Vancomycin 500 mg IV every 24 hours with next serum trough concentration measured at 0500 on 1/23    Scheduled Administration Times    0600 tomorrow (1/21)    Drug levels (last 3 results):  Recent Labs   Lab Result Units 01/20/23  1136   Vanc Lvl Random ug/ml 12.5*       Vancomycin Administrations:  vancomycin given in the last 96 hours                     vancomycin in dextrose 5 % 1 gram/250 mL IVPB 1,000 mg (mg) 1,000 mg New Bag 01/20/23 0216                    Pharmacy will continue to follow and monitor vancomycin.    Please contact pharmacy at extension [phone] for questions regarding this assessment.    Thank you for the consult,   Nadja Partida       Patient brief summary:  Modesto Payton is a 47 y.o. male initiated on antimicrobial therapy with IV Vancomycin for treatment of bacteremia    The patient's current regimen is 500 mg every 24 hours    Drug Allergies:   Review of patient's allergies indicates:   Allergen Reactions    Iodine     Penicillins     Sulfur        Actual Body Weight:   62.6 kg     Renal Function:   Estimated Creatinine Clearance: 39.1 mL/min (A) (based on SCr of 2.09 mg/dL (H)).,     Dialysis Method (if applicable):  N/A    CBC (last 72 hours):  Recent Labs   Lab Result Units 01/19/23 1959 01/20/23  0053   WBC x10(3)/mcL 46.0* 33.9*   Hgb gm/dL 13.5* 12.9*   Hct % 43.9 42.1   Platelet x10(3)/mcL 266 239   Monocyte Man % 5 5       Metabolic Panel (last 72 hours):  Recent Labs   Lab Result Units 01/19/23 1959 01/20/23  0053 01/20/23  0420   Sodium Level mmol/L 136 133* 132*   Potassium Level mmol/L 5.9* 5.1 4.5   Chloride mmol/L 102 102 100   Carbon Dioxide mmol/L 19* 18* 21*    Glucose Level mg/dL 118* 115* 134*   Glucose, UA mg/dL Negative  --   --    Blood Urea Nitrogen mg/dL 39.6* 38.5* 35.4*   Creatinine mg/dL 3.10* 2.55* 2.09*   Albumin Level g/dL 3.1*  --  2.6*   Bilirubin Total mg/dL 1.2  --  1.5   Alkaline Phosphatase unit/L 99  --  107   Aspartate Aminotransferase unit/L 17  --  18   Alanine Aminotransferase unit/L 11  --  11   Magnesium Level mg/dL 3.50*  --  3.50*   Phosphorus Level mg/dL  --   --  3.9       Microbiologic Results:  Microbiology Results (last 7 days)       Procedure Component Value Units Date/Time    Urine culture [[id number]] Collected: 01/19/23 1959    Order Status: Sent Specimen: Urine, Catheterized Updated: 01/20/23 0914    Blood Culture #1 **CANNOT BE ORDERED STAT** [[id number]] Collected: 01/19/23 1959    Order Status: Resulted Specimen: Blood Updated: 01/19/23 2009    Blood Culture #2 **CANNOT BE ORDERED STAT** [[id number]] Collected: 01/19/23 2008    Order Status: Resulted Specimen: Blood Updated: 01/19/23 2008

## 2023-01-20 NOTE — PROCEDURES
Modesto Payton is a 47 y.o. male patient.    Temp: 97.9 °F (36.6 °C) (01/19/23 1821)  Pulse: (!) 120 (01/19/23 2000)  Resp: 20 (01/19/23 2000)  BP: 106/60 (01/19/23 2000)  SpO2: 96 % (01/19/23 2000)    PICC  Date/Time: 1/19/2023 9:46 PM  Performed by: Mercedes Acosta RN  Consent Done: Yes  Time out: Immediately prior to procedure a time out was called to verify the correct patient, procedure, equipment, support staff and site/side marked as required  Indications: med administration and vascular access  Anesthesia: local infiltration  Local anesthetic: lidocaine 1% without epinephrine  Anesthetic Total (mL): 4  Preparation: skin prepped with ChloraPrep  Skin prep agent dried: skin prep agent completely dried prior to procedure  Sterile barriers: all five maximum sterile barriers used - cap, mask, sterile gown, sterile gloves, and large sterile sheet  Hand hygiene: hand hygiene performed prior to central venous catheter insertion  Location details: left brachial  Catheter type: single lumen  Catheter size: 4 Fr  Catheter Length: 18cm    Ultrasound guidance: yes  Vessel Caliber: medium and patent, compressibility normal  Needle advanced into vessel with real time Ultrasound guidance.  Guidewire confirmed in vessel.  Sterile sheath used.  Number of attempts: 1  Post-procedure: blood return through all ports, sterile dressing applied and chlorhexidine patch    Complications: none  Comments: Arm circumference 34cm        Mercedes Acosta RN  1/19/2023

## 2023-01-20 NOTE — PROGRESS NOTES
[facility] - Emergency Dept  Wound Care    Patient Name:  Modesto Payton   MRN:  [id number]  Date: 1/20/2023  Diagnosis: <principal problem not specified>    History:     Past Medical History:   Diagnosis Date    Anemia     Chronic constipation     Kidney disease     Osteomyelitis     Quadriplegia     Renal disease        Social History     Socioeconomic History    Marital status: Unknown   Tobacco Use    Smoking status: Never    Smokeless tobacco: Never   Substance and Sexual Activity    Alcohol use: Never    Drug use: Not Currently       Precautions:     Allergies as of 01/19/2023 - Reviewed 01/19/2023   Allergen Reaction Noted    Iodine  05/13/2022    Penicillins  05/13/2022    Sulfur  05/13/2022       WOC Assessment Details/Treatment        01/20/23 1453        Altered Skin Integrity 11/04/22 1203 Left anterior;lower Leg #5 Other (comment)   Date First Assessed/Time First Assessed: 11/04/22 1203   Altered Skin Integrity Present on Admission: yes  Side: Left  Orientation: anterior;lower  Location: Leg  Wound Number: #5  Is this injury device related?: No  Primary Wound Type: Other (comment)   Wound Image    Description of Altered Skin Integrity Full thickness tissue loss. Subcutaneous fat may be visible but bone, tendon or muscle are not exposed   Dressing Appearance Dry;Intact   Drainage Amount Moderate   Drainage Characteristics/Odor Serosanguineous   Appearance Red;Hypergranulation   Tissue loss description Full thickness   Periwound Area Intact   Wound Edges Defined   Wound Length (cm) 5 cm   Wound Width (cm) 4 cm   Wound Surface Area (cm^2) 20 cm^2   Care Cleansed with:;Wound cleanser   Dressing Applied        Altered Skin Integrity 01/20/23 1443 Right Heel   Date First Assessed/Time First Assessed: 01/20/23 1443   Altered Skin Integrity Present on Admission: yes  Side: Right  Location: Heel   Wound Image    Dressing Appearance Open to air   Drainage Amount Small   Drainage  Characteristics/Odor Serosanguineous   Appearance Red   Red (%), Wound Tissue Color 100 %   Periwound Area Macerated;Moist   Wound Edges Defined   Wound Length (cm) 2 cm   Wound Width (cm) 2 cm   Wound Surface Area (cm^2) 4 cm^2   Care Cleansed with:;Sterile normal saline   Dressing Applied     WOCN consulted for multiple wounds. Patient is currently on a stretcher. Unable to mobilize patient to view all wounds. Limited assessment performed. Discussed with Nurse.  Treatment recommendations put in place.   Right lower leg wound: Cleanse with wound cleanser, apply xeroform, 6x6 gentle foam border to be changed daily   Back wound: Cleanse with wound cleanser, apply xeroform, ABD pad, tape to be changed daily   Right posterior upper arm wound: Cleanse with wound cleanser, apply xeroform, 6x6 gentle foam border to be changed daily   Right posterior thigh wound: Cleanse with wound cleanser, apply xeroform, ABD pad, tape to be changed daily   Sacral wound: Cleanse with wound cleanser, apply xeroform, ABD pad, tape to be changed daily   Left anterior lower leg wound: Cleanse with wound cleanser, apply xeroform, gentle foam border to be changed daily   Left posterior thigh wound: Cleanse with wound cleanser and apply xeroform to the wound bed, cover with a gentle foam border dressing, change daily.   Right elbow wound: Cleanse with wound cleanser and apply xeroform to the wound bed, cover with a gentle foam border dressing, change daily.   Left anterior lower leg: Cleanse with wound cleanser and apply xeroform to the wound bed, cover with a gentle foam border dressing, change daily.   Right heel: Cleanse with wound cleanser, apply xeroform, ABD pad, tape to be changed daily.  ORLY mattress ordered with Size wize rep.   Unable to educate patient at this time due to confusion. No family members at bedside. Keep areas clean and dry, no adult briefs while in bed. Nursing to continue with turning every two hours, wedge and  floating heels.  Head of stretcher elevated, Stretcher in lowest position, and call bell within reach. Will follow up.  01/20/2023
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
## 2023-01-20 NOTE — PROGRESS NOTES
Pharmacokinetic Initial Assessment: IV Vancomycin    Assessment/Plan:    Initiate intravenous vancomycin with loading dose of 1000 mg once with subsequent doses when random concentrations are less than 20 mcg/mL  Desired empiric serum trough concentration is 15 to 20 mcg/mL  Draw vancomycin random level on 01/20 at 1200.  Pharmacy will continue to follow and monitor vancomycin.      Please contact pharmacy at extension [phone] with any questions regarding this assessment.     Thank you for the consult,   Cole Goldman       Patient brief summary:  Modesto Payton is a 47 y.o. male initiated on antimicrobial therapy with IV Vancomycin for treatment of suspected bacteremia    Drug Allergies:   Review of patient's allergies indicates:   Allergen Reactions    Iodine     Penicillins     Sulfur        Actual Body Weight:   62.6kg    Renal Function:   Estimated Creatinine Clearance: 26.4 mL/min (A) (based on SCr of 3.1 mg/dL (H)).,     Dialysis Method (if applicable):  N/A    CBC (last 72 hours):  Recent Labs   Lab Result Units 01/19/23 1959   WBC x10(3)/mcL 46.0*   Hgb gm/dL 13.5*   Hct % 43.9   Platelet x10(3)/mcL 266   Monocyte Man % 5       Metabolic Panel (last 72 hours):  Recent Labs   Lab Result Units 01/19/23  1959   Sodium Level mmol/L 136   Potassium Level mmol/L 5.9*   Chloride mmol/L 102   Carbon Dioxide mmol/L 19*   Glucose Level mg/dL 118*   Blood Urea Nitrogen mg/dL 39.6*   Creatinine mg/dL 3.10*   Albumin Level g/dL 3.1*   Bilirubin Total mg/dL 1.2   Alkaline Phosphatase unit/L 99   Aspartate Aminotransferase unit/L 17   Alanine Aminotransferase unit/L 11   Magnesium Level mg/dL 3.50*       Drug levels (last 3 results):  No results for input(s): VANCOMYCINRA, VANCORANDOM, VANCOMYCINPE, VANCOPEAK, VANCOMYCINTR, VANCOTROUGH in the last 72 hours.    Microbiologic Results:  Microbiology Results (last 7 days)       Procedure Component Value Units Date/Time    Blood Culture #1 **CANNOT BE ORDERED STAT**  [[id number]] Collected: 01/19/23 1959    Order Status: Sent Specimen: Blood Updated: 01/19/23 2009    Blood Culture #2 **CANNOT BE ORDERED STAT** [[id number]] Collected: 01/19/23 2008    Order Status: Sent Specimen: Blood Updated: 01/19/23 2008

## 2023-01-20 NOTE — CONSULTS
UROLOGY  PROGRESS  NOTE    [place] Payton 1975  [id number]  1/20/2023    Primary Urologist: N/A  On Call Urology: Sukumar Kelley MD    Subjective:  47-year-old man history of paraplegia admitted with suspected sepsis.  He was transferred from the outside hospital after a were unable to place a Kidd catheter.  Attempts were made at this emergency department and those were also unsuccessful.  We were consulted for evaluation.  The patient's mother is present.  She reports no prior history of difficult Kidd placement.      Objective:  Wt Readings from Last 3 Encounters:   01/19/23 62.6 kg (138 lb 0.1 oz)   01/13/23 74.8 kg (165 lb)   12/02/22 74.8 kg (165 lb)     Temp Readings from Last 3 Encounters:   01/19/23 97.9 °F (36.6 °C)   11/09/22 98.1 °F (36.7 °C) (Oral)   06/03/22 98.2 °F (36.8 °C) (Oral)     BP Readings from Last 3 Encounters:   01/19/23 97/71   01/13/23 130/80   12/02/22 124/72     Pulse Readings from Last 3 Encounters:   01/19/23 (!) 124   01/13/23 78   12/02/22 80       Intake/Output:  I/O this shift:  In: 2058 [I.V.:10; IV Piggyback:2048]  Out: -   No intake/output data recorded.       Exam:    NCAT  Card RRR  Resp unlabored  Abd soft, nontender, nondistended.  Colostomy.   normal external genitalia  Extremity no C/C/E      Recent Results (from the past 24 hour(s))   Comprehensive Metabolic Panel    Collection Time: 01/19/23  7:59 PM   Result Value Ref Range    Sodium Level 136 136 - 145 mmol/L    Potassium Level 5.9 (H) 3.5 - 5.1 mmol/L    Chloride 102 98 - 107 mmol/L    Carbon Dioxide 19 (L) 22 - 29 mmol/L    Glucose Level 118 (H) 74 - 100 mg/dL    Blood Urea Nitrogen 39.6 (H) 8.9 - 20.6 mg/dL    Creatinine 3.10 (H) 0.73 - 1.18 mg/dL    Calcium Level Total 9.1 8.4 - 10.2 mg/dL    Protein Total 8.5 (H) 6.4 - 8.3 gm/dL    Albumin Level 3.1 (L) 3.5 - 5.0 g/dL    Globulin 5.4 (H) 2.4 - 3.5 gm/dL    Albumin/Globulin Ratio 0.6 (L) 1.1 - 2.0 ratio    Bilirubin Total 1.2 <=1.5 mg/dL    Alkaline  Phosphatase 99 40 - 150 unit/L    Alanine Aminotransferase 11 0 - 55 unit/L    Aspartate Aminotransferase 17 5 - 34 unit/L    eGFR 24 mls/min/1.73/m2   Lipase    Collection Time: 01/19/23  7:59 PM   Result Value Ref Range    Lipase Level 4 <=60 U/L   Magnesium    Collection Time: 01/19/23  7:59 PM   Result Value Ref Range    Magnesium Level 3.50 (H) 1.60 - 2.60 mg/dL   Lactic Acid, Plasma    Collection Time: 01/19/23  7:59 PM   Result Value Ref Range    Lactic Acid Level 4.2 (HH) 0.5 - 2.2 mmol/L   Protime-INR    Collection Time: 01/19/23  7:59 PM   Result Value Ref Range    PT 17.6 (H) 11.7 - 14.5 seconds    INR 1.49 (L) 2.00 - 3.00   APTT    Collection Time: 01/19/23  7:59 PM   Result Value Ref Range    PTT 47.9 (H) 23.4 - 33.9 seconds   CBC with Differential    Collection Time: 01/19/23  7:59 PM   Result Value Ref Range    WBC 46.0 (H) 4.5 - 11.5 x10(3)/mcL    RBC 6.66 (H) 4.70 - 6.10 x10(6)/mcL    Hgb 13.5 (L) 14.0 - 18.0 gm/dL    Hct 43.9 42.0 - 52.0 %    MCV 65.9 (L) 80.0 - 94.0 fL    MCH 20.3 pg    MCHC 30.8 (L) 33.0 - 36.0 mg/dL    RDW 20.7 (H) 11.5 - 17.0 %    Platelet 266 130 - 400 x10(3)/mcL    MPV     Manual Differential    Collection Time: 01/19/23  7:59 PM   Result Value Ref Range    Neut Man 82 (H) 47 - 80 %    Band Neutrophil Man 6 0 - 11 %    Lymph Man 4 (L) 13 - 40 %    Monocyte Man 5 2 - 11 %    [place] Man 2 %    Abn Lymph Man 1 %    Abs Neut calc 41.4 (H) 2.1 - 9.2 x10(3)/mcL    Abs Mono 2.3 (H) 0.1 - 1.3 x10(3)/mcL    Abs Lymp 1.84 0.6 - 4.6 x10(3)/mcL    RBC Morph Abnormal (A) Normal    Anisocyte 3+ (A) (none)    Poik 3+ (A) (none)    Microcyte 3+ (A) (none)    Schistocyte 1+ (A) (none)    Tear drop cell 1+ (A) (none)    Elliptocytosis 1+ (A) (none)    Ovalocytes 1+ (A) (none)    WBC Vacuoles 1+ (A) (none)    Hyposeg      Platelet Est Adequate Normal, Adequate   Lactic Acid, Plasma    Collection Time: 01/20/23 12:53 AM   Result Value Ref Range    Lactic Acid Level 2.6 (H) 0.5 - 2.2 mmol/L   Basic  metabolic panel    Collection Time: 01/20/23 12:53 AM   Result Value Ref Range    Sodium Level 133 (L) 136 - 145 mmol/L    Potassium Level 5.1 3.5 - 5.1 mmol/L    Chloride 102 98 - 107 mmol/L    Carbon Dioxide 18 (L) 22 - 29 mmol/L    Glucose Level 115 (H) 74 - 100 mg/dL    Blood Urea Nitrogen 38.5 (H) 8.9 - 20.6 mg/dL    Creatinine 2.55 (H) 0.73 - 1.18 mg/dL    BUN/Creatinine Ratio 15     Calcium Level Total 8.4 8.4 - 10.2 mg/dL    Anion Gap 13.0 mEq/L    eGFR 31 mls/min/1.73/m2       Imaging Results              CT Chest Abdomen Pelvis Without Contrast (XPD) (Final result)  Result time 01/19/23 20:43:05      Final result by Lobito Brady MD (01/19/23 20:43:05)                   Impression:      1.  Right small pleural effusion.    2.  Right lower lung lobe atelectasis and/or mild infiltrates.  3.  Excessive fluid stomach and small bowel loops suggest gastroenteritis.    4.  Bilateral kidneys multiple stones.    5.  Bilateral moderate hydronephrosis with right distal ureteral calculus which is nonobstructing.  No obstructing ureterals calculus identified.    6.  Diffusely thickened urinary bladder wall.  Right posterior aspect of bladder heterogeneity may represent calcification versus a polypoid lesion.  Please further assess the urinary bladder with ultrasound exam.    7.  Decubitus ulceration.      Electronically signed by: Lobito Brady  Date:    01/19/2023  Time:    20:43               Narrative:    EXAMINATION:  CT CHEST ABDOMEN PELVIS WITHOUT CONTRAST(XPD)    CLINICAL HISTORY:  urinary retention;    TECHNIQUE:  Multidetector axial images were obtained from the thoracic inlet through the greater trochanters without the administration of IV contrast.    Dose length product of 1398 mGycm. Automated exposure control was utilized to minimize radiation dose.    COMPARISON:  CT abdomen pelvis September 8, 2016.    CHEST FINDINGS:    There is trace of right dependent pleural effusion.  Right lower lung zone  curvilinear atelectasis with exclude mild associated infiltrates.  There ar also right upper lung lobe mild focal atelectatic changes or mild scarring.  There is no pulmonary edema or hazy pneumonitis.    Thoracic aorta is without aneurysmal dilatation.  Cardiac chamber size is within normal limits.  There are no enlarged chest lymph nodes.  There is diffuse fluid-filled dilatation of the esophagus suggestive of reflux disease.    ABDOMINAL AND PELVIS FINDINGS:    Within limitation noncontrast technique, no acute findings of the liver, pancreas or the spleen identified.  Gallbladder lumen in the dependent portion contains small calculi without thickened wall or dilatation of ducts.  Adrenals are unremarkable.    There are bilateral kidneys multiple non occluding calculi.  There is bilateral moderate dilatation of the kidneys collecting systems and the ureters.  There is small calculus along the lateral wall of the right dilated distal ureter on image 93 series 2.  This does not appear to be obstructive.  No left ureteral calculus identified.  There is diffusely thickened urinary bladder wall which is more evident since the previous exam.  There is subtle heterogeneous density right posterolateral aspect of the bladder adjacent to the ureter insertion which may represent calcifications versus a polypoidal lesion.    Stomach is moderately dilated filled with fluid.  There is also mild excessive fluid within loops of small bowel.  Findings may represent gastroenteritis.  There is no abnormal dilatation of the loops of small bowel.  There is left lower abdomen ostomy.  There are right abdomen subhepatic location extending into the paracolic gutter small amount of free fluid.    There is right paramedian pelvic decubitus ulceration on image 104 series 2.  Demineralization of the bones multiple Schmorl node defects. Severe chronic deformities of the hip joints with degenerative changes.                                        CT Head Without Contrast (Final result)  Result time 01/19/23 20:29:39      Final result by Lobito Brady MD (01/19/23 20:29:39)                   Impression:      Limited study degraded by considerable artifacts.  These artifacts make it difficult to adequately assess the gray-white matter differentiation.  If clinically indicated a short follow-up CT brain or MRI of the brain may be considered to further assess.      Electronically signed by: Lobito Brady  Date:    01/19/2023  Time:    20:29               Narrative:    EXAMINATION:  CT HEAD WITHOUT CONTRAST    CLINICAL HISTORY:  Confusion / Altered Mental Status;    TECHNIQUE:  Sequential axial images were performed of the brain without contrast.    Dose product length of 1774 mGycm. Automated exposure control was utilized to minimize radiation dose.    COMPARISON:  None available.    FINDINGS:  There are extensive artifacts degrading the infratentorial and supratentorial images.  These artifacts make it difficult to adequately assess the gray-white matter differentiation.  There is no intracranial mass effect, midline shift, hydrocephalus or hemorrhage. There is no definite sulcal effacement or low attenuation changes to suggest recent large vessel territory infarction.  If clinically indicated a follow-up CT brain or further assessment with MRI of the brain may also be considered.  There is no acute extra axial fluid collection. Visualized paranasal sinuses are clear without mucosal thickening, polypoidal abnormality or air-fluid levels. Mastoid air cells aeration is optimal.                                    Procedure:  Urethral dilation and difficult Kidd catheter placement  In the emergency department I was able to advance a angle tipped wire through the urethra into the bladder.  However I was unable to dilate over that wire.  Subsequently I was able to advance a 4 Nep[place] filiform into the bladder.  Behind that I was able to sequentially dilate with  followers from 12 Malian up to 24 Malian.  I was then able to advance my 22 [place]ian [place] tip catheter over a wire into the bladder.  That was filled with fluid and put to gravity drainage.      Assessment:  Urinary retention   Urethral stricture      Plan:  I was able to perform urethral stricture dilation and catheter placement in the emergency department.  We returned about 500 cc of very cloudy urine.  Catheter was left in place.  He will be admitted to the hospitalist and we will continue to follow.      I have reviewed the CT scan.  Bilateral hydronephrosis.  Small speck of calcification in the right distal ureter is not clearly a stone but is clearly nonobstructing.  We will follow.    Sukumar Kelley MD

## 2023-01-20 NOTE — ED NOTES
"Pt transferred from [facility] ER due to urinary retention.  Per EMS ortho attempted to place pratt and was unsuccessful.  Dr Oconnor at  with MIGUEL Shah to attempt to place pratt catheter.  Pt does only yells "hey" at staff.  Pts upper extremities are contracted.  Padding placed to protect bony prominences.  Pt has colostomy noted to LLQ.  Blood noted at meatus prior to pratt attempt.  "

## 2023-01-20 NOTE — PROGRESS NOTES
Attempted to meet with patient to conduct initial cm dc planning assessment but patient AMS and no family currently in room.

## 2023-01-20 NOTE — ED NOTES
Arrived to [facility] ED from [facility] ED per CARMELI as a Medicine transfer: pt needs pratt for urinary retention. Unable to place there. See Transfer in report

## 2023-01-20 NOTE — CONSULTS
Inpatient Nutrition Assessment    Admit Date: 1/19/2023   Total duration of encounter: 1 day     Nutrition Recommendation/Prescription     Continue regular diet.   Add Robbie supplement PO BID to promote wound healing.    Communication of Recommendations: reviewed with patient/caregiver    Nutrition Assessment     Malnutrition Assessment/Nutrition-Focused Physical Exam    Malnutrition in the context of chronic illness  Degree of Malnutrition: unable to complete  Energy Intake: does not meet criteria  Interpretation of Weight Loss: unable to obtain  Body Fat: unable to obtain  Area of Body Fat Loss: unable to obtain  Muscle Mass Loss: unable to obtain  Area of Muscle Mass Loss: unable to obtain  Fluid Accumulation: unable to obtain  Edema: unable to obtain  Reduced  Strength: unable to obtain  A minimum of two characteristics is recommended for diagnosis of either severe or non-severe malnutrition.    Chart Review    Reason Seen: physician consult for wounds    Malnutrition Screening Tool Results                Diagnosis:  Severe sepsis   Acute UTI, complicated, POA, Kidd associated   Metabolic encephalopathy   Lactic acidosis   Acute on chronic kidney injury    Relevant Medical History: anemia, constipation, CKD, quadriplegia    Nutrition-Related Medications: Meropenem, Lactated Ringers IVF's  Calorie Containing IV Medications: no significant kcals from medications at this time    Nutrition-Related Labs:  1/20/23: Na 132, Bun 35.4, Creat 2.09, GFR 39, Gluc 134, Mag 3.50     Diet/PN Order: Diet Adult Regular  Oral Supplement Order: none  Tube Feeding Order: none  Appetite/Oral Intake: good/% of meals  Factors Affecting Nutritional Intake: none identified  Food/Sabianism/Cultural Preferences: none reported  Food Allergies: no known food allergies       Wound(s):   multiple altered skin integrity sights including; thigh, right elbow, lower leg, sacral spine    Comments    1/20/23: Patient and mother present  "during rounds in ED. Pt aggravated during rounds, mother provided hx. Mother states pt with good PO intakes at home, no chewing/swallowing concerns. Discussed oral nutrition supplement Robbie to help promote wound healing, mother would like it added to her sons meal trays. Physical assessment deferred until pt more appropriate.     Anthropometrics    Height: 6' 0.01" (182.9 cm) Height Method: Estimated  Last Weight: 62.6 kg (138 lb 0.1 oz) (01/19/23 6383) Weight Method: Bed Scale  BMI (Calculated): 18.7  BMI Classification: normal (BMI 18.5-24.9)        Ideal Body Weight (IBW), Male: 178.06 lb     % Ideal Body Weight, Male (lb): 77.53 %                          Usual Weight Provided By: EMR weight history. Mother states patient UBW about 145lbs, however does not have a time-frame. Currently not able to get a new bed wt in ED bed. Monitor wts.    Wt Readings from Last 5 Encounters:   01/19/23 62.6 kg (138 lb 0.1 oz)   01/13/23 74.8 kg (165 lb)   12/02/22 74.8 kg (165 lb)   11/09/22 74.8 kg (165 lb)   11/04/22 79.4 kg (175 lb)     Weight Change(s) Since Admission:  Admit Weight: 63 kg (138 lb 14.2 oz) (01/19/23 2216)  .    Estimated Needs    Weight Used For Calorie Calculations: 62.6 kg (138 lb 0.1 oz)  Energy Calorie Requirements (kcal): 1852 kcal/day (MSJ x 1.2 SF)     Weight Used For Protein Calculations: 62.6 kg (138 lb 0.1 oz)  Protein Requirements: 87 g/day (1.4 g/kg/d)  Fluid Requirements (mL): 1852 mL/day (1mL/kcal)  Temp: 98.7 °F (37.1 °C)       Enteral Nutrition    Patient not receiving enteral nutrition at this time.    Parenteral Nutrition    Patient not receiving parenteral nutrition support at this time.    Evaluation of Received Nutrient Intake    Calories: meeting estimated needs  Protein: meeting estimated needs    Patient Education    Not applicable.    Nutrition Diagnosis     PES: Increased nutrient needs related to increased protein-energy demand as evidenced by multiple wounds/pressure ulcers. " (new)    Interventions/Goals     Intervention(s): commercial beverage and collaboration with other providers  Goal: Meet greater than 75% of nutritional needs by follow-up. (new)    Monitoring & Evaluation     Dietitian will monitor food and beverage intake, weight change, and electrolyte/renal panel.  Nutrition Risk/Follow-Up: moderate (follow-up in 3-5 days)   Please consult if re-assessment needed sooner.

## 2023-01-21 LAB
ABS NEUT (OLG): 16.73 X10(3)/MCL (ref 2.1–9.2)
ACANTHOCYTES (OLG): ABNORMAL
ANION GAP SERPL CALC-SCNC: 11 MEQ/L
ANISOCYTOSIS BLD QL SMEAR: ABNORMAL
BUN SERPL-MCNC: 34.6 MG/DL (ref 8.9–20.6)
BURR CELLS (OLG): ABNORMAL
CALCIUM SERPL-MCNC: 8.2 MG/DL (ref 8.4–10.2)
CHLORIDE SERPL-SCNC: 104 MMOL/L (ref 98–107)
CO2 SERPL-SCNC: 19 MMOL/L (ref 22–29)
CREAT SERPL-MCNC: 0.45 MG/DL (ref 0.73–1.18)
CREAT/UREA NIT SERPL: 77
ERYTHROCYTE [DISTWIDTH] IN BLOOD BY AUTOMATED COUNT: 18.9 % (ref 11.5–17)
GFR SERPLBLD CREATININE-BSD FMLA CKD-EPI: >60 MLS/MIN/1.73/M2
GLUCOSE SERPL-MCNC: 83 MG/DL (ref 74–100)
HCT VFR BLD AUTO: 27.3 % (ref 42–52)
HGB BLD-MCNC: 8.6 GM/DL (ref 14–18)
IMM GRANULOCYTES # BLD AUTO: 0.17 X10(3)/MCL (ref 0–0.04)
IMM GRANULOCYTES NFR BLD AUTO: 1 %
INSTRUMENT WBC (OLG): 17.8 X10(3)/MCL
LACTATE SERPL-SCNC: 1.4 MMOL/L (ref 0.5–2.2)
LYMPHOCYTES NFR BLD MANUAL: 0.71 X10(3)/MCL
LYMPHOCYTES NFR BLD MANUAL: 4 %
MCH RBC QN AUTO: 20 PG
MCHC RBC AUTO-ENTMCNC: 31.5 MG/DL (ref 33–36)
MCV RBC AUTO: 63.5 FL (ref 80–94)
MICROCYTES BLD QL SMEAR: ABNORMAL
MONOCYTES NFR BLD MANUAL: 0.36 X10(3)/MCL (ref 0.1–1.3)
MONOCYTES NFR BLD MANUAL: 2 %
NEUTROPHILS NFR BLD MANUAL: 94 %
NRBC BLD AUTO-RTO: 0 %
PLATELET # BLD AUTO: 135 X10(3)/MCL (ref 130–400)
PLATELET # BLD EST: ABNORMAL 10*3/UL
PMV BLD AUTO: ABNORMAL FL
POCT GLUCOSE: 141 MG/DL (ref 70–110)
POIKILOCYTOSIS BLD QL SMEAR: ABNORMAL
POTASSIUM SERPL-SCNC: 3.7 MMOL/L (ref 3.5–5.1)
RBC # BLD AUTO: 4.3 X10(6)/MCL (ref 4.7–6.1)
RBC MORPH BLD: ABNORMAL
SICKLE CELLS BLD QL SMEAR: ABNORMAL
SODIUM SERPL-SCNC: 134 MMOL/L (ref 136–145)
TARGETS BLD QL SMEAR: ABNORMAL
WBC # SPEC AUTO: 17.8 X10(3)/MCL (ref 4.5–11.5)

## 2023-01-21 PROCEDURE — 25000003 PHARM REV CODE 250: Performed by: EMERGENCY MEDICINE

## 2023-01-21 PROCEDURE — 36415 COLL VENOUS BLD VENIPUNCTURE: CPT | Performed by: HOSPITALIST

## 2023-01-21 PROCEDURE — 21400001 HC TELEMETRY ROOM

## 2023-01-21 PROCEDURE — 83605 ASSAY OF LACTIC ACID: CPT | Performed by: HOSPITALIST

## 2023-01-21 PROCEDURE — 63600175 PHARM REV CODE 636 W HCPCS: Performed by: HOSPITALIST

## 2023-01-21 PROCEDURE — 63600175 PHARM REV CODE 636 W HCPCS: Performed by: INTERNAL MEDICINE

## 2023-01-21 PROCEDURE — 11000001 HC ACUTE MED/SURG PRIVATE ROOM

## 2023-01-21 PROCEDURE — C9113 INJ PANTOPRAZOLE SODIUM, VIA: HCPCS | Performed by: HOSPITALIST

## 2023-01-21 PROCEDURE — 25000003 PHARM REV CODE 250: Performed by: HOSPITALIST

## 2023-01-21 PROCEDURE — 80048 BASIC METABOLIC PNL TOTAL CA: CPT | Performed by: HOSPITALIST

## 2023-01-21 PROCEDURE — S0073 INJECTION, AZTREONAM, 500 MG: HCPCS | Performed by: EMERGENCY MEDICINE

## 2023-01-21 PROCEDURE — 85027 COMPLETE CBC AUTOMATED: CPT | Performed by: HOSPITALIST

## 2023-01-21 PROCEDURE — 85025 COMPLETE CBC W/AUTO DIFF WBC: CPT | Performed by: HOSPITALIST

## 2023-01-21 PROCEDURE — 25000003 PHARM REV CODE 250: Performed by: INTERNAL MEDICINE

## 2023-01-21 PROCEDURE — A4216 STERILE WATER/SALINE, 10 ML: HCPCS | Performed by: EMERGENCY MEDICINE

## 2023-01-21 PROCEDURE — 63600175 PHARM REV CODE 636 W HCPCS: Performed by: NURSE PRACTITIONER

## 2023-01-21 RX ORDER — BACLOFEN 10 MG/1
10 TABLET ORAL 3 TIMES DAILY
Status: DISCONTINUED | OUTPATIENT
Start: 2023-01-21 | End: 2023-01-27 | Stop reason: HOSPADM

## 2023-01-21 RX ORDER — HYDROCODONE BITARTRATE AND ACETAMINOPHEN 10; 325 MG/1; MG/1
1 TABLET ORAL EVERY 8 HOURS PRN
Status: DISCONTINUED | OUTPATIENT
Start: 2023-01-21 | End: 2023-01-27 | Stop reason: HOSPADM

## 2023-01-21 RX ADMIN — MEROPENEM 1 G: 1 INJECTION, POWDER, FOR SOLUTION INTRAVENOUS at 02:01

## 2023-01-21 RX ADMIN — ONDANSETRON 4 MG: 2 INJECTION INTRAMUSCULAR; INTRAVENOUS at 07:01

## 2023-01-21 RX ADMIN — ONDANSETRON 4 MG: 2 INJECTION INTRAMUSCULAR; INTRAVENOUS at 06:01

## 2023-01-21 RX ADMIN — AZTREONAM 1000 MG: 1 INJECTION, POWDER, LYOPHILIZED, FOR SOLUTION INTRAMUSCULAR; INTRAVENOUS at 06:01

## 2023-01-21 RX ADMIN — BACLOFEN 10 MG: 10 TABLET ORAL at 02:01

## 2023-01-21 RX ADMIN — HYDROCODONE BITARTRATE AND ACETAMINOPHEN 1 TABLET: 10; 325 TABLET ORAL at 09:01

## 2023-01-21 RX ADMIN — SODIUM CHLORIDE, PRESERVATIVE FREE 10 ML: 5 INJECTION INTRAVENOUS at 09:01

## 2023-01-21 RX ADMIN — MUPIROCIN: 20 OINTMENT TOPICAL at 09:01

## 2023-01-21 RX ADMIN — PANTOPRAZOLE SODIUM 40 MG: 40 INJECTION, POWDER, LYOPHILIZED, FOR SOLUTION INTRAVENOUS at 09:01

## 2023-01-21 RX ADMIN — BACLOFEN 10 MG: 10 TABLET ORAL at 09:01

## 2023-01-21 RX ADMIN — SODIUM CHLORIDE, PRESERVATIVE FREE 10 ML: 5 INJECTION INTRAVENOUS at 06:01

## 2023-01-21 RX ADMIN — PANTOPRAZOLE SODIUM 40 MG: 40 INJECTION, POWDER, LYOPHILIZED, FOR SOLUTION INTRAVENOUS at 10:01

## 2023-01-21 RX ADMIN — AZTREONAM 1000 MG: 1 INJECTION, POWDER, LYOPHILIZED, FOR SOLUTION INTRAMUSCULAR; INTRAVENOUS at 11:01

## 2023-01-21 RX ADMIN — AZTREONAM 1000 MG: 1 INJECTION, POWDER, LYOPHILIZED, FOR SOLUTION INTRAMUSCULAR; INTRAVENOUS at 04:01

## 2023-01-21 RX ADMIN — SODIUM CHLORIDE, PRESERVATIVE FREE 10 ML: 5 INJECTION INTRAVENOUS at 02:01
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
## 2023-01-21 NOTE — NURSING
Nurses Note -- 4 Eyes      1/21/2023   12:48 AM      Skin assessed during: Admit      [] No Pressure Injuries Present    []Prevention Measures Documented      [x] Yes- Altered Skin Integrity Present or Discovered   [x] LDA Added if Not in Epic (Describe Wound)   [x] New Altered Skin Integrity was Present on Admit and Documented in LDA   [x] Wound Image Taken    Wound Care Consulted? Yes    Attending Nurse:  Oni Carmona LPN     Second RN/Staff Member:  Sophia Wilder  Buttock wound unable to take picture due to pt stating he does not want to turn today he will turn with wound care tomorrow during their assessment

## 2023-01-21 NOTE — CONSULTS
Reason for Consult: hematemesis    HPI: 46 y/o gentleman who had 2 episodes of hematemesis yesterday afternoon but nothing since. Patient states this has never happened before and is otherwise without complaints.       Review of Systems   Constitutional: Negative.    Eyes: Negative.    Respiratory: Negative.     Cardiovascular: Negative.    Gastrointestinal: Negative.       Past Medical History:   Diagnosis Date    Anemia     Chronic constipation     Kidney disease     Osteomyelitis     Quadriplegia     Renal disease        Past Surgical History:   Procedure Laterality Date    FLAP PROCEDURE      for PU     multiple surgicla debridements      NECK SURGERY      spinal fusion        Family History   Family history unknown: Yes       Review of patient's allergies indicates:   Allergen Reactions    Iodine     Penicillins     Sulfur        Social History     Socioeconomic History    Marital status: Unknown   Tobacco Use    Smoking status: Never    Smokeless tobacco: Never   Substance and Sexual Activity    Alcohol use: Never    Drug use: Not Currently          aztreonam  1,000 mg Intravenous Q8H    baclofen  10 mg Oral TID    meropenem (MERREM) IVPB  1 g Intravenous Q12H    mupirocin   Nasal BID    pantoprazole  40 mg Intravenous BID    sodium chloride 0.9%  10 mL Intravenous Q6H    vancomycin (VANCOCIN) IVPB  500 mg Intravenous Q24H       Physical Exam  HENT:      Head: Normocephalic and atraumatic.      Nose: Nose normal.   Eyes:      Extraocular Movements: Extraocular movements intact.   Cardiovascular:      Rate and Rhythm: Normal rate and regular rhythm.   Pulmonary:      Effort: Pulmonary effort is normal.      Breath sounds: Normal breath sounds.   Abdominal:      General: Abdomen is flat. Bowel sounds are normal.      Palpations: Abdomen is soft.   Neurological:      Mental Status: He is alert.        Recent Labs   Lab 01/20/23  1834   HGB 10.6*   HCT 33.5*     No results for input(s): GLUCOSE, CALCIUM, PROT, NA,  K, CO2, CL, BUN, CREATININE, ALKPHOS, ALT, AST, BILITOT in the last 24 hours.    Invalid input(s):  ALBUMIN  No results for input(s): PT, INR, APTT in the last 24 hours.      Assessment:   1. Hematemesis with anemia    Recs:   1. Agree with PPI. NPO after MN Sunday for EGD Monday. Available if becomes more urgent/emergent.

## 2023-01-21 NOTE — PROGRESS NOTES
UROLOGY  Post-op NOTE    Modesto Payton 1975  [id number]  1/21/2023    Date of Procedure:      Procedure: * No surgery found *     Primary Urologist: N/A  On Call Urology: Sukumar Kelley MD    Subjective:  47-year-old man with quadriplegia and urinary retention.  Presents to the ER with confusion.  CUrethral dilation performed in the emergency department.  Currently on broad-spectrum antibiotics.  Cultures are pending.  Kidd catheter with good urine output.      Objective:  Wt Readings from Last 3 Encounters:   01/19/23 62.6 kg (138 lb 0.1 oz)   01/13/23 74.8 kg (165 lb)   12/02/22 74.8 kg (165 lb)     Temp Readings from Last 3 Encounters:   01/21/23 (!) 100.9 °F (38.3 °C) (Oral)   11/09/22 98.1 °F (36.7 °C) (Oral)   06/03/22 98.2 °F (36.8 °C) (Oral)     BP Readings from Last 3 Encounters:   01/21/23 96/67   01/13/23 130/80   12/02/22 124/72     Pulse Readings from Last 3 Encounters:   01/21/23 81   01/13/23 78   12/02/22 80       Intake/Output:  No intake/output data recorded.  I/O last 3 completed shifts:  In: 3257 [I.V.:10; IV Piggyback:3247]  Out: 1550 [Urine:1550]       Exam:    NCAT  Card RRR  Resp unlabored  Abd soft, non tender, nondistended   Kidd catheter with concentrated urine  Extremity no C/C/E      Recent Results (from the past 24 hour(s))   Lactic Acid, Plasma    Collection Time: 01/20/23  9:44 AM   Result Value Ref Range    Lactic Acid Level 2.4 (H) 0.5 - 2.2 mmol/L   Vancomycin, Random    Collection Time: 01/20/23 11:36 AM   Result Value Ref Range    Vanc Lvl Random 12.5 (L) 15.0 - 20.0 ug/ml   Lactic Acid, Plasma    Collection Time: 01/20/23 11:36 AM   Result Value Ref Range    Lactic Acid Level 2.0 0.5 - 2.2 mmol/L   Hemoglobin and Hematocrit    Collection Time: 01/20/23  6:34 PM   Result Value Ref Range    Hgb 10.6 (L) 14.0 - 18.0 gm/dL    Hct 33.5 (L) 42.0 - 52.0 %   POCT glucose    Collection Time: 01/20/23  8:53 PM   Result Value Ref Range    POCT Glucose 141 (H) 70 - 110 mg/dL        Imaging Results              CT Chest Abdomen Pelvis Without Contrast (XPD) (Final result)  Result time 01/19/23 20:43:05      Final result by Lobito Brady MD (01/19/23 20:43:05)                   Impression:      1.  Right small pleural effusion.    2.  Right lower lung lobe atelectasis and/or mild infiltrates.  3.  Excessive fluid stomach and small bowel loops suggest gastroenteritis.    4.  Bilateral kidneys multiple stones.    5.  Bilateral moderate hydronephrosis with right distal ureteral calculus which is nonobstructing.  No obstructing ureterals calculus identified.    6.  Diffusely thickened urinary bladder wall.  Right posterior aspect of bladder heterogeneity may represent calcification versus a polypoid lesion.  Please further assess the urinary bladder with ultrasound exam.    7.  Decubitus ulceration.      Electronically signed by: Lobito Brady  Date:    01/19/2023  Time:    20:43               Narrative:    EXAMINATION:  CT CHEST ABDOMEN PELVIS WITHOUT CONTRAST(XPD)    CLINICAL HISTORY:  urinary retention;    TECHNIQUE:  Multidetector axial images were obtained from the thoracic inlet through the greater trochanters without the administration of IV contrast.    Dose length product of 1398 mGycm. Automated exposure control was utilized to minimize radiation dose.    COMPARISON:  CT abdomen pelvis September 8, 2016.    CHEST FINDINGS:    There is trace of right dependent pleural effusion.  Right lower lung zone curvilinear atelectasis with exclude mild associated infiltrates.  There ar also right upper lung lobe mild focal atelectatic changes or mild scarring.  There is no pulmonary edema or hazy pneumonitis.    Thoracic aorta is without aneurysmal dilatation.  Cardiac chamber size is within normal limits.  There are no enlarged chest lymph nodes.  There is diffuse fluid-filled dilatation of the esophagus suggestive of reflux disease.    ABDOMINAL AND PELVIS FINDINGS:    Within limitation  noncontrast technique, no acute findings of the liver, pancreas or the spleen identified.  Gallbladder lumen in the dependent portion contains small calculi without thickened wall or dilatation of ducts.  Adrenals are unremarkable.    There are bilateral kidneys multiple non occluding calculi.  There is bilateral moderate dilatation of the kidneys collecting systems and the ureters.  There is small calculus along the lateral wall of the right dilated distal ureter on image 93 series 2.  This does not appear to be obstructive.  No left ureteral calculus identified.  There is diffusely thickened urinary bladder wall which is more evident since the previous exam.  There is subtle heterogeneous density right posterolateral aspect of the bladder adjacent to the ureter insertion which may represent calcifications versus a polypoidal lesion.    Stomach is moderately dilated filled with fluid.  There is also mild excessive fluid within loops of small bowel.  Findings may represent gastroenteritis.  There is no abnormal dilatation of the loops of small bowel.  There is left lower abdomen ostomy.  There are right abdomen subhepatic location extending into the paracolic gutter small amount of free fluid.    There is right paramedian pelvic decubitus ulceration on image 104 series 2.  Demineralization of the bones multiple Schmorl node defects. Severe chronic deformities of the hip joints with degenerative changes.                                       CT Head Without Contrast (Final result)  Result time 01/19/23 20:29:39      Final result by Lobito Brady MD (01/19/23 20:29:39)                   Impression:      Limited study degraded by considerable artifacts.  These artifacts make it difficult to adequately assess the gray-white matter differentiation.  If clinically indicated a short follow-up CT brain or MRI of the brain may be considered to further assess.      Electronically signed by: Lobito  Brady  Date:    01/19/2023  Time:    20:29               Narrative:    EXAMINATION:  CT HEAD WITHOUT CONTRAST    CLINICAL HISTORY:  Confusion / Altered Mental Status;    TECHNIQUE:  Sequential axial images were performed of the brain without contrast.    Dose product length of 1774 mGycm. Automated exposure control was utilized to minimize radiation dose.    COMPARISON:  None available.    FINDINGS:  There are extensive artifacts degrading the infratentorial and supratentorial images.  These artifacts make it difficult to adequately assess the gray-white matter differentiation.  There is no intracranial mass effect, midline shift, hydrocephalus or hemorrhage. There is no definite sulcal effacement or low attenuation changes to suggest recent large vessel territory infarction.  If clinically indicated a follow-up CT brain or further assessment with MRI of the brain may also be considered.  There is no acute extra axial fluid collection. Visualized paranasal sinuses are clear without mucosal thickening, polypoidal abnormality or air-fluid levels. Mastoid air cells aeration is optimal.                                        Assessment:  Paraplegia  Urethral stricture   Acute renal failure      Plan:  Catheter is draining well.  Renal function is improved.  Cultures are pending.  Continue broad-spectrum antibiotics.  Eventually will need cystoscopy for evaluation of his urethral stricture.  Continue medical management for now    Sukumar Kelley MD

## 2023-01-22 LAB
ABS NEUT (OLG): 13.82 X10(3)/MCL (ref 2.1–9.2)
ACANTHOCYTES (OLG): ABNORMAL
ANION GAP SERPL CALC-SCNC: 11 MEQ/L
ANISOCYTOSIS BLD QL SMEAR: ABNORMAL
BACTERIA UR CULT: NO GROWTH
BUN SERPL-MCNC: 21.7 MG/DL (ref 8.9–20.6)
BURR CELLS (OLG): ABNORMAL
CALCIUM SERPL-MCNC: 7.9 MG/DL (ref 8.4–10.2)
CHLORIDE SERPL-SCNC: 107 MMOL/L (ref 98–107)
CO2 SERPL-SCNC: 16 MMOL/L (ref 22–29)
CREAT SERPL-MCNC: 0.4 MG/DL (ref 0.73–1.18)
CREAT/UREA NIT SERPL: 54
ERYTHROCYTE [DISTWIDTH] IN BLOOD BY AUTOMATED COUNT: 18.9 % (ref 11.5–17)
GFR SERPLBLD CREATININE-BSD FMLA CKD-EPI: >60 MLS/MIN/1.73/M2
GLUCOSE SERPL-MCNC: 80 MG/DL (ref 74–100)
HCT VFR BLD AUTO: 27.9 % (ref 42–52)
HGB BLD-MCNC: 8.6 GM/DL (ref 14–18)
IMM GRANULOCYTES # BLD AUTO: 0.06 X10(3)/MCL (ref 0–0.04)
IMM GRANULOCYTES NFR BLD AUTO: 0.4 %
INSTRUMENT WBC (OLG): 14.7 X10(3)/MCL
LACTATE SERPL-SCNC: 0.8 MMOL/L (ref 0.5–2.2)
LYMPHOCYTES NFR BLD MANUAL: 0.29 X10(3)/MCL
LYMPHOCYTES NFR BLD MANUAL: 2 %
MCH RBC QN AUTO: 19.9 PG
MCHC RBC AUTO-ENTMCNC: 30.8 MG/DL (ref 33–36)
MCV RBC AUTO: 64.6 FL (ref 80–94)
MICROCYTES BLD QL SMEAR: ABNORMAL
MONOCYTES NFR BLD MANUAL: 0.59 X10(3)/MCL (ref 0.1–1.3)
MONOCYTES NFR BLD MANUAL: 4 %
NEUTROPHILS NFR BLD MANUAL: 94 %
NRBC BLD AUTO-RTO: 0 %
PLATELET # BLD AUTO: 107 X10(3)/MCL (ref 130–400)
PLATELET # BLD EST: ABNORMAL 10*3/UL
PMV BLD AUTO: ABNORMAL FL
POIKILOCYTOSIS BLD QL SMEAR: ABNORMAL
POTASSIUM SERPL-SCNC: 3.7 MMOL/L (ref 3.5–5.1)
RBC # BLD AUTO: 4.32 X10(6)/MCL (ref 4.7–6.1)
RBC MORPH BLD: ABNORMAL
SODIUM SERPL-SCNC: 134 MMOL/L (ref 136–145)
WBC # SPEC AUTO: 14.7 X10(3)/MCL (ref 4.5–11.5)

## 2023-01-22 PROCEDURE — C9113 INJ PANTOPRAZOLE SODIUM, VIA: HCPCS | Performed by: HOSPITALIST

## 2023-01-22 PROCEDURE — 63600175 PHARM REV CODE 636 W HCPCS: Performed by: INTERNAL MEDICINE

## 2023-01-22 PROCEDURE — 85025 COMPLETE CBC W/AUTO DIFF WBC: CPT | Performed by: HOSPITALIST

## 2023-01-22 PROCEDURE — 80048 BASIC METABOLIC PNL TOTAL CA: CPT | Performed by: HOSPITALIST

## 2023-01-22 PROCEDURE — 63600175 PHARM REV CODE 636 W HCPCS: Performed by: HOSPITALIST

## 2023-01-22 PROCEDURE — 85027 COMPLETE CBC AUTOMATED: CPT | Performed by: HOSPITALIST

## 2023-01-22 PROCEDURE — 94761 N-INVAS EAR/PLS OXIMETRY MLT: CPT

## 2023-01-22 PROCEDURE — A4216 STERILE WATER/SALINE, 10 ML: HCPCS | Performed by: EMERGENCY MEDICINE

## 2023-01-22 PROCEDURE — 25000003 PHARM REV CODE 250: Performed by: HOSPITALIST

## 2023-01-22 PROCEDURE — 36415 COLL VENOUS BLD VENIPUNCTURE: CPT | Performed by: HOSPITALIST

## 2023-01-22 PROCEDURE — 25000003 PHARM REV CODE 250: Performed by: EMERGENCY MEDICINE

## 2023-01-22 PROCEDURE — 83605 ASSAY OF LACTIC ACID: CPT | Performed by: HOSPITALIST

## 2023-01-22 PROCEDURE — 21400001 HC TELEMETRY ROOM

## 2023-01-22 PROCEDURE — 25000003 PHARM REV CODE 250: Performed by: INTERNAL MEDICINE

## 2023-01-22 RX ADMIN — BACLOFEN 10 MG: 10 TABLET ORAL at 09:01

## 2023-01-22 RX ADMIN — MUPIROCIN: 20 OINTMENT TOPICAL at 09:01

## 2023-01-22 RX ADMIN — SODIUM CHLORIDE, PRESERVATIVE FREE 10 ML: 5 INJECTION INTRAVENOUS at 02:01

## 2023-01-22 RX ADMIN — VANCOMYCIN HYDROCHLORIDE 500 MG: 500 INJECTION, POWDER, LYOPHILIZED, FOR SOLUTION INTRAVENOUS at 05:01

## 2023-01-22 RX ADMIN — HYDROCODONE BITARTRATE AND ACETAMINOPHEN 1 TABLET: 10; 325 TABLET ORAL at 09:01

## 2023-01-22 RX ADMIN — SODIUM CHLORIDE 125 MG: 9 INJECTION, SOLUTION INTRAVENOUS at 09:01

## 2023-01-22 RX ADMIN — PANTOPRAZOLE SODIUM 40 MG: 40 INJECTION, POWDER, LYOPHILIZED, FOR SOLUTION INTRAVENOUS at 10:01

## 2023-01-22 RX ADMIN — PANTOPRAZOLE SODIUM 40 MG: 40 INJECTION, POWDER, LYOPHILIZED, FOR SOLUTION INTRAVENOUS at 08:01

## 2023-01-22 RX ADMIN — MEROPENEM 1 G: 1 INJECTION, POWDER, FOR SOLUTION INTRAVENOUS at 02:01

## 2023-01-22 RX ADMIN — HYDROCODONE BITARTRATE AND ACETAMINOPHEN 1 TABLET: 10; 325 TABLET ORAL at 08:01

## 2023-01-22 RX ADMIN — SODIUM CHLORIDE, PRESERVATIVE FREE 10 ML: 5 INJECTION INTRAVENOUS at 05:01

## 2023-01-22 RX ADMIN — BACLOFEN 10 MG: 10 TABLET ORAL at 02:01

## 2023-01-22 RX ADMIN — MEROPENEM 1 G: 1 INJECTION, POWDER, FOR SOLUTION INTRAVENOUS at 10:01

## 2023-01-22 RX ADMIN — Medication: at 09:01

## 2023-01-22 RX ADMIN — BACLOFEN 10 MG: 10 TABLET ORAL at 08:01

## 2023-01-22 RX ADMIN — MEROPENEM 1 G: 1 INJECTION, POWDER, FOR SOLUTION INTRAVENOUS at 12:01

## 2023-01-22 NOTE — PROGRESS NOTES
Pharmacist Renal Dose Adjustment Note    Mod[place] Payton is a 47 y.o. male being treated with the medication meropenem.    Patient Data:    Vital Signs (Most Recent):  Temp: 97.5 °F (36.4 °C) (01/22/23 0111)  Pulse: 74 (01/22/23 1558)  Resp: 18 (01/22/23 1558)  BP: 134/77 (01/22/23 1558)  SpO2: 100 % (01/22/23 1127) Vital Signs (72h Range):  Temp:  [97.4 °F (36.3 °C)-100.9 °F (38.3 °C)]   Pulse:  []   Resp:  [13-36]   BP: ()/(58-83)   SpO2:  [95 %-100 %]      Recent Labs   Lab 01/20/23  0420 01/21/23 2006 01/22/23  1044   CREATININE 2.09* 0.45* 0.40*     Serum creatinine: 0.4 mg/dL (L) 01/22/23 1044  Estimated creatinine clearance: 204.3 mL/min (A)    Meropenem 1 gm q12h will be changed to 1 gram q8h.  Renal function has improved.      Pharmacist's Name: Estelita Herrera

## 2023-01-22 NOTE — PROGRESS NOTES
UROLOGY  PROGRESS  NOTE    Modesto Payton 1975  [id number]  1/22/2023    Primary Urologist: N/A  On Call Urology: Sukumar Kelley MD    Subjective:  47-year-old man multiple medical issues.  On admission there was difficulty with a Kidd catheter placement we performed dilation of a urethral stricture with catheter placement.  No acute events overnight.  No urologic complaints      Objective:  Wt Readings from Last 3 Encounters:   01/19/23 62.6 kg (138 lb 0.1 oz)   01/13/23 74.8 kg (165 lb)   12/02/22 74.8 kg (165 lb)     Temp Readings from Last 3 Encounters:   01/22/23 97.5 °F (36.4 °C) (Oral)   11/09/22 98.1 °F (36.7 °C) (Oral)   06/03/22 98.2 °F (36.8 °C) (Oral)     BP Readings from Last 3 Encounters:   01/22/23 116/68   01/13/23 130/80   12/02/22 124/72     Pulse Readings from Last 3 Encounters:   01/22/23 81   01/13/23 78   12/02/22 80       Intake/Output:  No intake/output data recorded.  I/O last 3 completed shifts:  In: -   Out: 1250 [Urine:1250]       Exam:    NCAT  Card RRR  Resp unlabored  Abd soft, nontender, nondistended   Kidd catheter clear yellow Urine   Extremity no C/C/E      Recent Results (from the past 24 hour(s))   Basic Metabolic Panel    Collection Time: 01/21/23  8:06 PM   Result Value Ref Range    Sodium Level 134 (L) 136 - 145 mmol/L    Potassium Level 3.7 3.5 - 5.1 mmol/L    Chloride 104 98 - 107 mmol/L    Carbon Dioxide 19 (L) 22 - 29 mmol/L    Glucose Level 83 74 - 100 mg/dL    Blood Urea Nitrogen 34.6 (H) 8.9 - 20.6 mg/dL    Creatinine 0.45 (L) 0.73 - 1.18 mg/dL    BUN/Creatinine Ratio 77     Calcium Level Total 8.2 (L) 8.4 - 10.2 mg/dL    Anion Gap 11.0 mEq/L    eGFR >60 mls/min/1.73/m2   Lactic Acid, Plasma    Collection Time: 01/21/23  8:06 PM   Result Value Ref Range    Lactic Acid Level 1.4 0.5 - 2.2 mmol/L   CBC with Differential    Collection Time: 01/21/23  8:07 PM   Result Value Ref Range    WBC 17.8 (H) 4.5 - 11.5 x10(3)/mcL    RBC 4.30 (L) 4.70 - 6.10 x10(6)/mcL    Hgb  8.6 (L) 14.0 - 18.0 gm/dL    Hct 27.3 (L) 42.0 - 52.0 %    MCV 63.5 (L) 80.0 - 94.0 fL    MCH 20.0 pg    MCHC 31.5 (L) 33.0 - 36.0 mg/dL    RDW 18.9 (H) 11.5 - 17.0 %    Platelet 135 130 - 400 x10(3)/mcL    MPV      IG# 0.17 (H) 0 - 0.04 x10(3)/mcL    IG% 1.0 %    NRBC% 0.0 %   Manual Differential    Collection Time: 01/21/23  8:07 PM   Result Value Ref Range    Neut Man 94 %    Lymph Man 4 %    Monocyte Man 2 %    Instr WBC 17.8 x10(3)/mcL    Abs Mono 0.356 0.1 - 1.3 x10(3)/mcL    Abs Lymp 0.712 0.6 - 4.6 x10(3)/mcL    Abs Neut 16.732 (H) 2.1 - 9.2 x10(3)/mcL    RBC Morph Abnormal (A) Normal    Anisocyte 1+ (A) (none)    Poik 3+ (A) (none)    Microcyte 1+ (A) (none)    Sickle Cell 1+ (A) (none)    Target Cell 1+ (A) (none)    Jani Cells 2+ (A) (none)    Acanthocytes 1+ (A) (none)    Platelet Est Decreased (A) Normal, Adequate   Basic Metabolic Panel    Collection Time: 01/22/23 10:44 AM   Result Value Ref Range    Sodium Level 134 (L) 136 - 145 mmol/L    Potassium Level 3.7 3.5 - 5.1 mmol/L    Chloride 107 98 - 107 mmol/L    Carbon Dioxide 16 (L) 22 - 29 mmol/L    Glucose Level 80 74 - 100 mg/dL    Blood Urea Nitrogen 21.7 (H) 8.9 - 20.6 mg/dL    Creatinine 0.40 (L) 0.73 - 1.18 mg/dL    BUN/Creatinine Ratio 54     Calcium Level Total 7.9 (L) 8.4 - 10.2 mg/dL    Anion Gap 11.0 mEq/L    eGFR >60 mls/min/1.73/m2   Lactic Acid, Plasma    Collection Time: 01/22/23 10:44 AM   Result Value Ref Range    Lactic Acid Level 0.8 0.5 - 2.2 mmol/L   CBC with Differential    Collection Time: 01/22/23 10:44 AM   Result Value Ref Range    WBC 14.7 (H) 4.5 - 11.5 x10(3)/mcL    RBC 4.32 (L) 4.70 - 6.10 x10(6)/mcL    Hgb 8.6 (L) 14.0 - 18.0 gm/dL    Hct 27.9 (L) 42.0 - 52.0 %    MCV 64.6 (L) 80.0 - 94.0 fL    MCH 19.9 pg    MCHC 30.8 (L) 33.0 - 36.0 mg/dL    RDW 18.9 (H) 11.5 - 17.0 %    Platelet 107 (L) 130 - 400 x10(3)/mcL    MPV      IG# 0.06 (H) 0 - 0.04 x10(3)/mcL    IG% 0.4 %    NRBC% 0.0 %   Manual Differential    Collection  Time: 01/22/23 10:44 AM   Result Value Ref Range    Neut Man 94 %    Lymph Man 2 %    Monocyte Man 4 %    Instr WBC 14.7 x10(3)/mcL    Abs Mono 0.588 0.1 - 1.3 x10(3)/mcL    Abs Lymp 0.294 (L) 0.6 - 4.6 x10(3)/mcL    Abs Neut 13.818 (H) 2.1 - 9.2 x10(3)/mcL    RBC Morph Abnormal (A) Normal    Anisocyte 2+ (A) (none)    Poik 2+ (A) (none)    Microcyte 1+ (A) (none)    Jani Cells 2+ (A) (none)    Acanthocytes 1+ (A) (none)    Platelet Est Decreased (A) Normal, Adequate       Imaging Results              CT Chest Abdomen Pelvis Without Contrast (XPD) (Final result)  Result time 01/19/23 20:43:05      Final result by Lobito Brady MD (01/19/23 20:43:05)                   Impression:      1.  Right small pleural effusion.    2.  Right lower lung lobe atelectasis and/or mild infiltrates.  3.  Excessive fluid stomach and small bowel loops suggest gastroenteritis.    4.  Bilateral kidneys multiple stones.    5.  Bilateral moderate hydronephrosis with right distal ureteral calculus which is nonobstructing.  No obstructing ureterals calculus identified.    6.  Diffusely thickened urinary bladder wall.  Right posterior aspect of bladder heterogeneity may represent calcification versus a polypoid lesion.  Please further assess the urinary bladder with ultrasound exam.    7.  Decubitus ulceration.      Electronically signed by: Lobito Brady  Date:    01/19/2023  Time:    20:43               Narrative:    EXAMINATION:  CT CHEST ABDOMEN PELVIS WITHOUT CONTRAST(XPD)    CLINICAL HISTORY:  urinary retention;    TECHNIQUE:  Multidetector axial images were obtained from the thoracic inlet through the greater trochanters without the administration of IV contrast.    Dose length product of 1398 mGycm. Automated exposure control was utilized to minimize radiation dose.    COMPARISON:  CT abdomen pelvis September 8, 2016.    CHEST FINDINGS:    There is trace of right dependent pleural effusion.  Right lower lung zone curvilinear  atelectasis with exclude mild associated infiltrates.  There ar also right upper lung lobe mild focal atelectatic changes or mild scarring.  There is no pulmonary edema or hazy pneumonitis.    Thoracic aorta is without aneurysmal dilatation.  Cardiac chamber size is within normal limits.  There are no enlarged chest lymph nodes.  There is diffuse fluid-filled dilatation of the esophagus suggestive of reflux disease.    ABDOMINAL AND PELVIS FINDINGS:    Within limitation noncontrast technique, no acute findings of the liver, pancreas or the spleen identified.  Gallbladder lumen in the dependent portion contains small calculi without thickened wall or dilatation of ducts.  Adrenals are unremarkable.    There are bilateral kidneys multiple non occluding calculi.  There is bilateral moderate dilatation of the kidneys collecting systems and the ureters.  There is small calculus along the lateral wall of the right dilated distal ureter on image 93 series 2.  This does not appear to be obstructive.  No left ureteral calculus identified.  There is diffusely thickened urinary bladder wall which is more evident since the previous exam.  There is subtle heterogeneous density right posterolateral aspect of the bladder adjacent to the ureter insertion which may represent calcifications versus a polypoidal lesion.    Stomach is moderately dilated filled with fluid.  There is also mild excessive fluid within loops of small bowel.  Findings may represent gastroenteritis.  There is no abnormal dilatation of the loops of small bowel.  There is left lower abdomen ostomy.  There are right abdomen subhepatic location extending into the paracolic gutter small amount of free fluid.    There is right paramedian pelvic decubitus ulceration on image 104 series 2.  Demineralization of the bones multiple Schmorl node defects. Severe chronic deformities of the hip joints with degenerative changes.                                       CT Head  Without Contrast (Final result)  Result time 01/19/23 20:29:39      Final result by Lobito Brady MD (01/19/23 20:29:39)                   Impression:      Limited study degraded by considerable artifacts.  These artifacts make it difficult to adequately assess the gray-white matter differentiation.  If clinically indicated a short follow-up CT brain or MRI of the brain may be considered to further assess.      Electronically signed by: Lobito Brady  Date:    01/19/2023  Time:    20:29               Narrative:    EXAMINATION:  CT HEAD WITHOUT CONTRAST    CLINICAL HISTORY:  Confusion / Altered Mental Status;    TECHNIQUE:  Sequential axial images were performed of the brain without contrast.    Dose product length of 1774 mGycm. Automated exposure control was utilized to minimize radiation dose.    COMPARISON:  None available.    FINDINGS:  There are extensive artifacts degrading the infratentorial and supratentorial images.  These artifacts make it difficult to adequately assess the gray-white matter differentiation.  There is no intracranial mass effect, midline shift, hydrocephalus or hemorrhage. There is no definite sulcal effacement or low attenuation changes to suggest recent large vessel territory infarction.  If clinically indicated a follow-up CT brain or further assessment with MRI of the brain may also be considered.  There is no acute extra axial fluid collection. Visualized paranasal sinuses are clear without mucosal thickening, polypoidal abnormality or air-fluid levels. Mastoid air cells aeration is optimal.                                    Urine culture  Order: [id number] - Reflex for Order [id number]  Status: Final result     Visible to patient: Yes (not seen)     Next appt: 02/03/2023 at 12:00 PM in Wound Care (Modesto Gonzalez MD)     Specimen Information: Urine, Catheterized   0 Result Notes  Urine Culture, Routine No Growth            Resulting Agency: [facility]           Specimen Collected:  01/19/23 19:59 Last Resulted: 01/22/23 08:12             Assessment:  urethral stricture      Plan:  Continue Kidd catheter.  No additional urologic recommendations.  Will eventually need voiding trial and cystoscopy likely as an outpatient.    Sukumar Kelley MD
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
## 2023-01-23 ENCOUNTER — ANESTHESIA (OUTPATIENT)
Dept: ENDOSCOPY | Facility: HOSPITAL | Age: 48
End: 2023-01-23
Payer: MEDICARE

## 2023-01-23 ENCOUNTER — ANESTHESIA EVENT (OUTPATIENT)
Dept: ENDOSCOPY | Facility: HOSPITAL | Age: 48
End: 2023-01-23

## 2023-01-23 LAB
ANION GAP SERPL CALC-SCNC: 16 MEQ/L
BASOPHILS # BLD AUTO: 0.02 X10(3)/MCL (ref 0–0.2)
BASOPHILS NFR BLD AUTO: 0.2 %
BUN SERPL-MCNC: 11 MG/DL (ref 8.9–20.6)
CALCIUM SERPL-MCNC: 7.9 MG/DL (ref 8.4–10.2)
CHLORIDE SERPL-SCNC: 106 MMOL/L (ref 98–107)
CO2 SERPL-SCNC: 16 MMOL/L (ref 22–29)
CREAT SERPL-MCNC: 0.38 MG/DL (ref 0.73–1.18)
CREAT/UREA NIT SERPL: 29
EOSINOPHIL # BLD AUTO: 0.01 X10(3)/MCL (ref 0–0.9)
EOSINOPHIL NFR BLD AUTO: 0.1 %
ERYTHROCYTE [DISTWIDTH] IN BLOOD BY AUTOMATED COUNT: 19.1 % (ref 11.5–17)
GFR SERPLBLD CREATININE-BSD FMLA CKD-EPI: >60 MLS/MIN/1.73/M2
GLUCOSE SERPL-MCNC: 53 MG/DL (ref 74–100)
HCT VFR BLD AUTO: 25.2 % (ref 42–52)
HGB BLD-MCNC: 7.7 GM/DL (ref 14–18)
IMM GRANULOCYTES # BLD AUTO: 0.05 X10(3)/MCL (ref 0–0.04)
IMM GRANULOCYTES NFR BLD AUTO: 0.4 %
LYMPHOCYTES # BLD AUTO: 0.64 X10(3)/MCL (ref 0.6–4.6)
LYMPHOCYTES NFR BLD AUTO: 5.3 %
MCH RBC QN AUTO: 19.7 PG
MCHC RBC AUTO-ENTMCNC: 30.6 MG/DL (ref 33–36)
MCV RBC AUTO: 64.5 FL (ref 80–94)
MONOCYTES # BLD AUTO: 0.28 X10(3)/MCL (ref 0.1–1.3)
MONOCYTES NFR BLD AUTO: 2.3 %
NEUTROPHILS # BLD AUTO: 11 X10(3)/MCL (ref 2.1–9.2)
NEUTROPHILS NFR BLD AUTO: 91.7 %
NRBC BLD AUTO-RTO: 0 %
PLATELET # BLD AUTO: 113 X10(3)/MCL (ref 130–400)
PMV BLD AUTO: ABNORMAL FL
POTASSIUM SERPL-SCNC: 3.2 MMOL/L (ref 3.5–5.1)
RBC # BLD AUTO: 3.91 X10(6)/MCL (ref 4.7–6.1)
SODIUM SERPL-SCNC: 138 MMOL/L (ref 136–145)
WBC # SPEC AUTO: 12 X10(3)/MCL (ref 4.5–11.5)

## 2023-01-23 PROCEDURE — 85025 COMPLETE CBC W/AUTO DIFF WBC: CPT | Performed by: HOSPITALIST

## 2023-01-23 PROCEDURE — 80048 BASIC METABOLIC PNL TOTAL CA: CPT | Performed by: HOSPITALIST

## 2023-01-23 PROCEDURE — 25000003 PHARM REV CODE 250: Performed by: NURSE ANESTHETIST, CERTIFIED REGISTERED

## 2023-01-23 PROCEDURE — C9113 INJ PANTOPRAZOLE SODIUM, VIA: HCPCS | Performed by: HOSPITALIST

## 2023-01-23 PROCEDURE — 88305 TISSUE EXAM BY PATHOLOGIST: CPT | Performed by: STUDENT IN AN ORGANIZED HEALTH CARE EDUCATION/TRAINING PROGRAM

## 2023-01-23 PROCEDURE — 25000003 PHARM REV CODE 250: Performed by: HOSPITALIST

## 2023-01-23 PROCEDURE — 25000003 PHARM REV CODE 250: Performed by: NURSE PRACTITIONER

## 2023-01-23 PROCEDURE — 21400001 HC TELEMETRY ROOM

## 2023-01-23 PROCEDURE — 88313 SPECIAL STAINS GROUP 2: CPT

## 2023-01-23 PROCEDURE — 63600175 PHARM REV CODE 636 W HCPCS: Performed by: HOSPITALIST

## 2023-01-23 PROCEDURE — 94761 N-INVAS EAR/PLS OXIMETRY MLT: CPT

## 2023-01-23 PROCEDURE — 88312 SPECIAL STAINS GROUP 1: CPT

## 2023-01-23 PROCEDURE — 37000008 HC ANESTHESIA 1ST 15 MINUTES: Performed by: STUDENT IN AN ORGANIZED HEALTH CARE EDUCATION/TRAINING PROGRAM

## 2023-01-23 PROCEDURE — 25000003 PHARM REV CODE 250: Performed by: EMERGENCY MEDICINE

## 2023-01-23 PROCEDURE — 37000009 HC ANESTHESIA EA ADD 15 MINS: Performed by: STUDENT IN AN ORGANIZED HEALTH CARE EDUCATION/TRAINING PROGRAM

## 2023-01-23 PROCEDURE — 27201423 OPTIME MED/SURG SUP & DEVICES STERILE SUPPLY: Performed by: STUDENT IN AN ORGANIZED HEALTH CARE EDUCATION/TRAINING PROGRAM

## 2023-01-23 PROCEDURE — 63600175 PHARM REV CODE 636 W HCPCS: Performed by: NURSE ANESTHETIST, CERTIFIED REGISTERED

## 2023-01-23 PROCEDURE — 43270 EGD LESION ABLATION: CPT | Performed by: STUDENT IN AN ORGANIZED HEALTH CARE EDUCATION/TRAINING PROGRAM

## 2023-01-23 PROCEDURE — 36415 COLL VENOUS BLD VENIPUNCTURE: CPT | Performed by: HOSPITALIST

## 2023-01-23 PROCEDURE — 25000003 PHARM REV CODE 250: Performed by: INTERNAL MEDICINE

## 2023-01-23 PROCEDURE — A4216 STERILE WATER/SALINE, 10 ML: HCPCS | Performed by: EMERGENCY MEDICINE

## 2023-01-23 RX ORDER — LIDOCAINE HYDROCHLORIDE 20 MG/ML
INJECTION, SOLUTION EPIDURAL; INFILTRATION; INTRACAUDAL; PERINEURAL
Status: COMPLETED
Start: 2023-01-23 | End: 2023-01-23

## 2023-01-23 RX ORDER — PROPOFOL 10 MG/ML
VIAL (ML) INTRAVENOUS
Status: COMPLETED
Start: 2023-01-23 | End: 2023-01-23

## 2023-01-23 RX ORDER — LANOLIN ALCOHOL/MO/W.PET/CERES
1 CREAM (GRAM) TOPICAL DAILY
Status: DISCONTINUED | OUTPATIENT
Start: 2023-01-23 | End: 2023-01-27 | Stop reason: HOSPADM

## 2023-01-23 RX ORDER — PROPOFOL 10 MG/ML
VIAL (ML) INTRAVENOUS
Status: DISCONTINUED | OUTPATIENT
Start: 2023-01-23 | End: 2023-01-23

## 2023-01-23 RX ORDER — LIDOCAINE HYDROCHLORIDE 20 MG/ML
INJECTION, SOLUTION EPIDURAL; INFILTRATION; INTRACAUDAL; PERINEURAL
Status: DISCONTINUED | OUTPATIENT
Start: 2023-01-23 | End: 2023-01-23

## 2023-01-23 RX ADMIN — MEROPENEM 1 G: 1 INJECTION, POWDER, FOR SOLUTION INTRAVENOUS at 10:01

## 2023-01-23 RX ADMIN — PROPOFOL 20 MG: 10 INJECTION, EMULSION INTRAVENOUS at 10:01

## 2023-01-23 RX ADMIN — BACLOFEN 10 MG: 10 TABLET ORAL at 10:01

## 2023-01-23 RX ADMIN — BACLOFEN 10 MG: 10 TABLET ORAL at 02:01

## 2023-01-23 RX ADMIN — SODIUM CHLORIDE, PRESERVATIVE FREE 10 ML: 5 INJECTION INTRAVENOUS at 12:01

## 2023-01-23 RX ADMIN — Medication 6 MG: at 10:01

## 2023-01-23 RX ADMIN — PROPOFOL 10 MG: 10 INJECTION, EMULSION INTRAVENOUS at 10:01

## 2023-01-23 RX ADMIN — LIDOCAINE HYDROCHLORIDE 5 ML: 20 INJECTION, SOLUTION INTRAVENOUS at 10:01

## 2023-01-23 RX ADMIN — PANTOPRAZOLE SODIUM 40 MG: 40 INJECTION, POWDER, LYOPHILIZED, FOR SOLUTION INTRAVENOUS at 12:01

## 2023-01-23 RX ADMIN — HYDROCODONE BITARTRATE AND ACETAMINOPHEN 1 TABLET: 10; 325 TABLET ORAL at 12:01

## 2023-01-23 RX ADMIN — MUPIROCIN: 20 OINTMENT TOPICAL at 12:01

## 2023-01-23 RX ADMIN — PANTOPRAZOLE SODIUM 40 MG: 40 INJECTION, POWDER, LYOPHILIZED, FOR SOLUTION INTRAVENOUS at 10:01

## 2023-01-23 RX ADMIN — SODIUM CHLORIDE, SODIUM GLUCONATE, SODIUM ACETATE, POTASSIUM CHLORIDE AND MAGNESIUM CHLORIDE: 526; 502; 368; 37; 30 INJECTION, SOLUTION INTRAVENOUS at 10:01

## 2023-01-23 RX ADMIN — HYDROCODONE BITARTRATE AND ACETAMINOPHEN 1 TABLET: 10; 325 TABLET ORAL at 10:01

## 2023-01-23 RX ADMIN — POLYETHYLENE GLYCOL 3350 17 G: 17 POWDER, FOR SOLUTION ORAL at 02:01

## 2023-01-23 RX ADMIN — MEROPENEM 1 G: 1 INJECTION, POWDER, FOR SOLUTION INTRAVENOUS at 06:01

## 2023-01-23 RX ADMIN — SODIUM CHLORIDE, PRESERVATIVE FREE 10 ML: 5 INJECTION INTRAVENOUS at 06:01

## 2023-01-23 RX ADMIN — PROPOFOL 5 MG: 10 INJECTION, EMULSION INTRAVENOUS at 10:01

## 2023-01-23 RX ADMIN — FERROUS SULFATE TAB 325 MG (65 MG ELEMENTAL FE) 1 EACH: 325 (65 FE) TAB at 12:01

## 2023-01-23 RX ADMIN — MEROPENEM 1 G: 1 INJECTION, POWDER, FOR SOLUTION INTRAVENOUS at 02:01

## 2023-01-23 RX ADMIN — MUPIROCIN: 20 OINTMENT TOPICAL at 10:01

## 2023-01-23 RX ADMIN — BACLOFEN 10 MG: 10 TABLET ORAL at 12:01

## 2023-01-23 NOTE — TRANSFER OF CARE
"Anesthesia Transfer of Care Note    Patient: Modesto Payton    Procedure(s) Performed: Procedure(s) (LRB):  EGD (ESOPHAGOGASTRODUODENOSCOPY) (N/A)  EGD,WITH HEMORRHAGE CONTROL  EGD, WITH CLOSED BIOPSY    Patient location: GI    Anesthesia Type: general    Transport from OR: Transported from OR on room air with adequate spontaneous ventilation    Post pain: adequate analgesia    Post assessment: no apparent anesthetic complications and tolerated procedure well    Post vital signs: stable    Level of consciousness: awake, oriented and alert    Nausea/Vomiting: no nausea/vomiting    Complications: none    Transfer of care protocol was followed      Last vitals:   Visit Vitals  BP (!) 177/91   Pulse 78   Temp 36 °C (96.8 °F) (Tympanic)   Resp 16   Ht 6' 0.01" (1.829 m)   Wt 62.6 kg (138 lb 0.1 oz)   SpO2 100%   BMI 18.71 kg/m²     "

## 2023-01-23 NOTE — PLAN OF CARE
Received referral for [facility] gastro for repeat EGD in 8 weeks. Referral sent to [facility] via [employer].

## 2023-01-23 NOTE — ANESTHESIA PREPROCEDURE EVALUATION
"                                                                                                             01/23/2023  Modesto Payton is a 47 y.o., male who presents with h/o Quadriplegia and recent  Hematemesis/Melena, Anemia.  Diagnosis:   Hematemesis without nausea [K92.0]    He comes to [facility] for the noted procedure under GA/TIVA w/  IV Propofol.  Procedure:   EGD (ESOPHAGOGASTRODUODENOSCOPY) (Abdomen)    PMHx:  Problem List  Current as of 01/23/23 0949  Pressure injury of sacral region, stage 4 Pressure injury of left leg, stage 4   Pressure injury of right leg, stage 4 Pressure injury of right upper back, stage 4   Peripheral vascular disease, unspecified      Other Medical History   Anemia Osteomyelitis   Quadriplegia Chronic constipation   Kidney disease Renal disease           Surgical History:  NECK SURGERY FLAP PROCEDURE   multiple surgicla debridements          Vital signs:  Pre Vitals     Current as of 01/23/23 0950  BP: 177/91 Pulse: 78   Resp: 16 SpO2: 100   Temp: 36 °C (96.8 °F)   Height: 6' 0.01" (1.829 m) (01/20/23) Weight: 62.6 kg (138 lb 0.1 oz) (01/19/23)   BMI: 18.71 IBW: 77.6 kg (171 lb 1.9 oz)   Last edited 01/23/23 0948 by           Lab Data:              Pre-op Assessment    I have reviewed the Patient Summary Reports.     I have reviewed the Nursing Notes. I have reviewed the NPO Status.   I have reviewed the Medications.     Review of Systems  Anesthesia Hx:  No problems with previous Anesthesia    Social:  Non-Smoker    Hematology/Oncology:  Hematology Normal   Oncology Normal     EENT/Dental:EENT/Dental Normal   Cardiovascular:  Cardiovascular Normal Exercise tolerance: good   Functional Capacity good / => 4 METS    Pulmonary:  Pulmonary Normal    Renal/:   Chronic Renal Disease    Hepatic/GI:  Hepatic/GI Normal    Musculoskeletal:  Spine Disorders:    Neurological:   Neuromuscular Disease,    Endocrine:  Endocrine Normal    Dermatological:  Skin Normal  "   Psych:   Psychiatric History depression          Physical Exam  General: Alert, Oriented, Well nourished and Cooperative    Airway:  Mallampati: II   Mouth Opening: Normal  TM Distance: Normal  Tongue: Normal  Neck ROM: Flexion Decreased, Extension Decreased    Dental:  Intact    Chest/Lungs:  Clear to auscultation, Normal Respiratory Rate    Heart:  Rate: Normal  Rhythm: Regular Rhythm        Anesthesia Plan  Type of Anesthesia, risks & benefits discussed:    Anesthesia Type: Gen Natural Airway  Intra-op Monitoring Plan: Standard ASA Monitors  Induction:  IV  Informed Consent: Informed consent signed with the Patient and all parties understand the risks and agree with anesthesia plan.  All questions answered.   ASA Score: 3  Day of Surgery Review of History & Physical: H&P Update referred to the surgeon/provider.    Ready For Surgery From Anesthesia Perspective.     .       Show Asc Variables: Yes
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
## 2023-01-23 NOTE — PROVATION PATIENT INSTRUCTIONS
Discharge Summary/Instructions after an Endoscopic Procedure  Patient Name: Modesto Payton  Patient MRN: [id number]  Patient YOB: 1975  Monday, January 23, 2023  Phani Truong MD  Dear patient,  As a result of recent federal legislation (The Federal Cures Act), you may   receive lab or pathology results from your procedure in your [facility]   account before your physician is able to contact you. Your physician or   their representative will relay the results to you with their   recommendations at their soonest availability.  Thank you,  RESTRICTIONS:  During your procedure today, you received medications for sedation.  These   medications may affect your judgment, balance and coordination.  Therefore,   for 24 hours, you have the following restrictions:   - DO NOT drive a car, operate machinery, make legal/financial decisions,   sign important papers or drink alcohol.    ACTIVITY:  Today: no heavy lifting, straining or running due to procedural   sedation/anesthesia.  The following day: return to full activity including work.  DIET:  Eat and drink normally unless instructed otherwise.     TREATMENT FOR COMMON SIDE EFFECTS:  - Mild abdominal pain, nausea, belching, bloating or excessive gas:  rest,   eat lightly and use a heating pad.  - Sore Throat: treat with throat lozenges and/or gargle with warm salt   water.  - Because air was used during the procedure, expelling large amounts of air   from your rectum or belching is normal.  - If a bowel prep was taken, you may not have a bowel movement for 1-3 days.    This is normal.  SYMPTOMS TO WATCH FOR AND REPORT TO YOUR PHYSICIAN:  1. Abdominal pain or bloating, other than gas cramps.  2. Chest pain.  3. Back pain.  4. Signs of infection such as: chills or fever occurring within 24 hours   after the procedure.  5. Rectal bleeding, which would show as bright red, maroon, or black stools.   (A tablespoon of blood from the rectum is not serious, especially if    hemorrhoids are present.)  6. Vomiting.  7. Weakness or dizziness.  GO DIRECTLY TO THE NEAREST EMERGENCY ROOM IF YOU HAVE ANY OF THE FOLLOWING:      Difficulty breathing              Chills and/or fever over 101 F   Persistent vomiting and/or vomiting blood   Severe abdominal pain   Severe chest pain   Black, tarry stools   Bleeding- more than one tablespoon   Any other symptom or condition that you feel may need urgent attention  Your doctor recommends these additional instructions:  If any biopsies were taken, your doctors clinic will contact you in 1 to 2   weeks with any results.  - Return patient to [facility] for ongoing care.   - Clear liquid diet today.   - Continue present medications.   - Await pathology results.   - Use Protonix (pantoprazole) 40 mg PO BID for 6 weeks.   - Repeat upper endoscopy in 8 weeks to check healing.  For questions, problems or results please call your physician - Phani Truong MD at Work:  [phone].  [facility], EMERGENCY ROOM PHONE NUMBER: [phone]  IF A COMPLICATION OR EMERGENCY SITUATION ARISES AND YOU ARE UNABLE TO REACH   YOUR PHYSICIAN - GO DIRECTLY TO THE EMERGENCY ROOM.  MD Phani Brambila MD  1/23/2023 11:54:43 AM  This report has been verified and signed electronically.  Dear patient,  As a result of recent federal legislation (The Federal Cures Act), you may   receive lab or pathology results from your procedure in your [facility]   account before your physician is able to contact you. Your physician or   their representative will relay the results to you with their   recommendations at their soonest availability.  Thank you,  PROVATION
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
## 2023-01-23 NOTE — PROGRESS NOTES
UROLOGY  Post-op NOTE    Modesto Payton 1975  [id number]  1/23/2023    Date of Procedure: 1/23/2023     Procedure: Procedure(s) (LRB):  EGD (ESOPHAGOGASTRODUODENOSCOPY) (N/A)     Primary Urologist: N/A  On Call Urology: Sukumar Kelley MD    Subjective:  47-year-old man urethral stricture      Objective:  Wt Readings from Last 3 Encounters:   01/19/23 62.6 kg (138 lb 0.1 oz)   01/13/23 74.8 kg (165 lb)   12/02/22 74.8 kg (165 lb)     Temp Readings from Last 3 Encounters:   01/23/23 97.1 °F (36.2 °C)   11/09/22 98.1 °F (36.7 °C) (Oral)   06/03/22 98.2 °F (36.8 °C) (Oral)     BP Readings from Last 3 Encounters:   01/23/23 138/72   01/13/23 130/80   12/02/22 124/72     Pulse Readings from Last 3 Encounters:   01/23/23 77   01/13/23 78   12/02/22 80       Intake/Output:  I/O this shift:  In: -   Out: 400 [Urine:400]  I/O last 3 completed shifts:  In: 1620 [P.O.:1620]  Out: 1550 [Urine:1550]       Exam:    NCAT  Card RRR  Resp unlabored  Abd soft nontender nondistended   Kidd catheter clear yellow urine  Extremity no C/C/E      Recent Results (from the past 24 hour(s))   Basic Metabolic Panel    Collection Time: 01/22/23 10:44 AM   Result Value Ref Range    Sodium Level 134 (L) 136 - 145 mmol/L    Potassium Level 3.7 3.5 - 5.1 mmol/L    Chloride 107 98 - 107 mmol/L    Carbon Dioxide 16 (L) 22 - 29 mmol/L    Glucose Level 80 74 - 100 mg/dL    Blood Urea Nitrogen 21.7 (H) 8.9 - 20.6 mg/dL    Creatinine 0.40 (L) 0.73 - 1.18 mg/dL    BUN/Creatinine Ratio 54     Calcium Level Total 7.9 (L) 8.4 - 10.2 mg/dL    Anion Gap 11.0 mEq/L    eGFR >60 mls/min/1.73/m2   Lactic Acid, Plasma    Collection Time: 01/22/23 10:44 AM   Result Value Ref Range    Lactic Acid Level 0.8 0.5 - 2.2 mmol/L   CBC with Differential    Collection Time: 01/22/23 10:44 AM   Result Value Ref Range    WBC 14.7 (H) 4.5 - 11.5 x10(3)/mcL    RBC 4.32 (L) 4.70 - 6.10 x10(6)/mcL    Hgb 8.6 (L) 14.0 - 18.0 gm/dL    Hct 27.9 (L) 42.0 - 52.0 %    MCV 64.6 (L)  80.0 - 94.0 fL    MCH 19.9 pg    MCHC 30.8 (L) 33.0 - 36.0 mg/dL    RDW 18.9 (H) 11.5 - 17.0 %    Platelet 107 (L) 130 - 400 x10(3)/mcL    MPV      IG# 0.06 (H) 0 - 0.04 x10(3)/mcL    IG% 0.4 %    NRBC% 0.0 %   Manual Differential    Collection Time: 01/22/23 10:44 AM   Result Value Ref Range    Neut Man 94 %    Lymph Man 2 %    Monocyte Man 4 %    Instr WBC 14.7 x10(3)/mcL    Abs Mono 0.588 0.1 - 1.3 x10(3)/mcL    Abs Lymp 0.294 (L) 0.6 - 4.6 x10(3)/mcL    Abs Neut 13.818 (H) 2.1 - 9.2 x10(3)/mcL    RBC Morph Abnormal (A) Normal    Anisocyte 2+ (A) (none)    Poik 2+ (A) (none)    Microcyte 1+ (A) (none)    Jani Cells 2+ (A) (none)    Acanthocytes 1+ (A) (none)    Platelet Est Decreased (A) Normal, Adequate       Imaging Results              CT Chest Abdomen Pelvis Without Contrast (XPD) (Final result)  Result time 01/19/23 20:43:05      Final result by Lobito Brady MD (01/19/23 20:43:05)                   Impression:      1.  Right small pleural effusion.    2.  Right lower lung lobe atelectasis and/or mild infiltrates.  3.  Excessive fluid stomach and small bowel loops suggest gastroenteritis.    4.  Bilateral kidneys multiple stones.    5.  Bilateral moderate hydronephrosis with right distal ureteral calculus which is nonobstructing.  No obstructing ureterals calculus identified.    6.  Diffusely thickened urinary bladder wall.  Right posterior aspect of bladder heterogeneity may represent calcification versus a polypoid lesion.  Please further assess the urinary bladder with ultrasound exam.    7.  Decubitus ulceration.      Electronically signed by: Lobito Brady  Date:    01/19/2023  Time:    20:43               Narrative:    EXAMINATION:  CT CHEST ABDOMEN PELVIS WITHOUT CONTRAST(XPD)    CLINICAL HISTORY:  urinary retention;    TECHNIQUE:  Multidetector axial images were obtained from the thoracic inlet through the greater trochanters without the administration of IV contrast.    Dose length product of 1398  mGycm. Automated exposure control was utilized to minimize radiation dose.    COMPARISON:  CT abdomen pelvis September 8, 2016.    CHEST FINDINGS:    There is trace of right dependent pleural effusion.  Right lower lung zone curvilinear atelectasis with exclude mild associated infiltrates.  There ar also right upper lung lobe mild focal atelectatic changes or mild scarring.  There is no pulmonary edema or hazy pneumonitis.    Thoracic aorta is without aneurysmal dilatation.  Cardiac chamber size is within normal limits.  There are no enlarged chest lymph nodes.  There is diffuse fluid-filled dilatation of the esophagus suggestive of reflux disease.    ABDOMINAL AND PELVIS FINDINGS:    Within limitation noncontrast technique, no acute findings of the liver, pancreas or the spleen identified.  Gallbladder lumen in the dependent portion contains small calculi without thickened wall or dilatation of ducts.  Adrenals are unremarkable.    There are bilateral kidneys multiple non occluding calculi.  There is bilateral moderate dilatation of the kidneys collecting systems and the ureters.  There is small calculus along the lateral wall of the right dilated distal ureter on image 93 series 2.  This does not appear to be obstructive.  No left ureteral calculus identified.  There is diffusely thickened urinary bladder wall which is more evident since the previous exam.  There is subtle heterogeneous density right posterolateral aspect of the bladder adjacent to the ureter insertion which may represent calcifications versus a polypoidal lesion.    Stomach is moderately dilated filled with fluid.  There is also mild excessive fluid within loops of small bowel.  Findings may represent gastroenteritis.  There is no abnormal dilatation of the loops of small bowel.  There is left lower abdomen ostomy.  There are right abdomen subhepatic location extending into the paracolic gutter small amount of free fluid.    There is right  paramedian pelvic decubitus ulceration on image 104 series 2.  Demineralization of the bones multiple Schmorl node defects. Severe chronic deformities of the hip joints with degenerative changes.                                       CT Head Without Contrast (Final result)  Result time 01/19/23 20:29:39      Final result by Lobito Brady MD (01/19/23 20:29:39)                   Impression:      Limited study degraded by considerable artifacts.  These artifacts make it difficult to adequately assess the gray-white matter differentiation.  If clinically indicated a short follow-up CT brain or MRI of the brain may be considered to further assess.      Electronically signed by: Lobito Brady  Date:    01/19/2023  Time:    20:29               Narrative:    EXAMINATION:  CT HEAD WITHOUT CONTRAST    CLINICAL HISTORY:  Confusion / Altered Mental Status;    TECHNIQUE:  Sequential axial images were performed of the brain without contrast.    Dose product length of 1774 mGycm. Automated exposure control was utilized to minimize radiation dose.    COMPARISON:  None available.    FINDINGS:  There are extensive artifacts degrading the infratentorial and supratentorial images.  These artifacts make it difficult to adequately assess the gray-white matter differentiation.  There is no intracranial mass effect, midline shift, hydrocephalus or hemorrhage. There is no definite sulcal effacement or low attenuation changes to suggest recent large vessel territory infarction.  If clinically indicated a follow-up CT brain or further assessment with MRI of the brain may also be considered.  There is no acute extra axial fluid collection. Visualized paranasal sinuses are clear without mucosal thickening, polypoidal abnormality or air-fluid levels. Mastoid air cells aeration is optimal.                                    Modesto Payton #[id number] (CSN: [id number]) (47 y.o. M) (Adm: 01/19/23)  [facility] [id number]      Results  Urine  culture (Order [id number])  Urine culture  Order: [id number] - Reflex for Order [id number]  Status: Final result     Visible to patient: Yes (not seen)     Next appt: 02/03/2023 at 12:00 PM in Wound Care (Modesto Gonzalez MD)     Specimen Information: Urine, Catheterized   0 Result Notes  Urine Culture, Routine No Growth            Resulting Agency: [facility]           Specimen Collected: 01/19/23 19:59 Last Resulted: 01/22/23 08:12               Assessment:  Urethral stricture      Plan:  Continue Kidd catheter.  Patient will be seen as an outpatient for flexible cystoscopy prior to voiding trial.  Please contact us for additional questions or concerns.  No additional urologic recommendations    Sukumar Kelley MD

## 2023-01-24 LAB
ANION GAP SERPL CALC-SCNC: 7 MEQ/L
BASOPHILS # BLD AUTO: 0.02 X10(3)/MCL (ref 0–0.2)
BASOPHILS NFR BLD AUTO: 0.2 %
BUN SERPL-MCNC: 6.8 MG/DL (ref 8.9–20.6)
CALCIUM SERPL-MCNC: 7.6 MG/DL (ref 8.4–10.2)
CHLORIDE SERPL-SCNC: 106 MMOL/L (ref 98–107)
CO2 SERPL-SCNC: 20 MMOL/L (ref 22–29)
CREAT SERPL-MCNC: 0.43 MG/DL (ref 0.73–1.18)
CREAT/UREA NIT SERPL: 16
EOSINOPHIL # BLD AUTO: 0.02 X10(3)/MCL (ref 0–0.9)
EOSINOPHIL NFR BLD AUTO: 0.2 %
ERYTHROCYTE [DISTWIDTH] IN BLOOD BY AUTOMATED COUNT: 18.9 % (ref 11.5–17)
GFR SERPLBLD CREATININE-BSD FMLA CKD-EPI: >60 MLS/MIN/1.73/M2
GLUCOSE SERPL-MCNC: 80 MG/DL (ref 74–100)
HCT VFR BLD AUTO: 26.2 % (ref 42–52)
HGB BLD-MCNC: 8 GM/DL (ref 14–18)
IMM GRANULOCYTES # BLD AUTO: 0.06 X10(3)/MCL (ref 0–0.04)
IMM GRANULOCYTES NFR BLD AUTO: 0.7 %
LYMPHOCYTES # BLD AUTO: 0.88 X10(3)/MCL (ref 0.6–4.6)
LYMPHOCYTES NFR BLD AUTO: 10.1 %
MCH RBC QN AUTO: 19.4 PG
MCHC RBC AUTO-ENTMCNC: 30.5 MG/DL (ref 33–36)
MCV RBC AUTO: 63.4 FL (ref 80–94)
MONOCYTES # BLD AUTO: 0.49 X10(3)/MCL (ref 0.1–1.3)
MONOCYTES NFR BLD AUTO: 5.6 %
NEUTROPHILS # BLD AUTO: 7.27 X10(3)/MCL (ref 2.1–9.2)
NEUTROPHILS NFR BLD AUTO: 83.2 %
NRBC BLD AUTO-RTO: 0 %
PLATELET # BLD AUTO: 107 X10(3)/MCL (ref 130–400)
PMV BLD AUTO: ABNORMAL FL
POTASSIUM SERPL-SCNC: 3.8 MMOL/L (ref 3.5–5.1)
PSYCHE PATHOLOGY RESULT: NORMAL
RBC # BLD AUTO: 4.13 X10(6)/MCL (ref 4.7–6.1)
SODIUM SERPL-SCNC: 133 MMOL/L (ref 136–145)
WBC # SPEC AUTO: 8.7 X10(3)/MCL (ref 4.5–11.5)

## 2023-01-24 PROCEDURE — 36415 COLL VENOUS BLD VENIPUNCTURE: CPT | Performed by: HOSPITALIST

## 2023-01-24 PROCEDURE — 85025 COMPLETE CBC W/AUTO DIFF WBC: CPT | Performed by: HOSPITALIST

## 2023-01-24 PROCEDURE — 25000003 PHARM REV CODE 250: Performed by: NURSE PRACTITIONER

## 2023-01-24 PROCEDURE — 80048 BASIC METABOLIC PNL TOTAL CA: CPT | Performed by: HOSPITALIST

## 2023-01-24 PROCEDURE — 25000003 PHARM REV CODE 250

## 2023-01-24 PROCEDURE — 25000003 PHARM REV CODE 250: Performed by: HOSPITALIST

## 2023-01-24 PROCEDURE — 21400001 HC TELEMETRY ROOM

## 2023-01-24 PROCEDURE — A4216 STERILE WATER/SALINE, 10 ML: HCPCS | Performed by: EMERGENCY MEDICINE

## 2023-01-24 PROCEDURE — 63600175 PHARM REV CODE 636 W HCPCS: Performed by: HOSPITALIST

## 2023-01-24 PROCEDURE — 25000003 PHARM REV CODE 250: Performed by: EMERGENCY MEDICINE

## 2023-01-24 RX ORDER — PANTOPRAZOLE SODIUM 40 MG/1
40 TABLET, DELAYED RELEASE ORAL
Status: DISCONTINUED | OUTPATIENT
Start: 2023-01-24 | End: 2023-01-27 | Stop reason: HOSPADM

## 2023-01-24 RX ORDER — ZOLPIDEM TARTRATE 5 MG/1
10 TABLET ORAL NIGHTLY PRN
Status: DISCONTINUED | OUTPATIENT
Start: 2023-01-24 | End: 2023-01-27 | Stop reason: HOSPADM

## 2023-01-24 RX ADMIN — MEROPENEM 1 G: 1 INJECTION, POWDER, FOR SOLUTION INTRAVENOUS at 06:01

## 2023-01-24 RX ADMIN — MEROPENEM 1 G: 1 INJECTION, POWDER, FOR SOLUTION INTRAVENOUS at 10:01

## 2023-01-24 RX ADMIN — FERROUS SULFATE TAB 325 MG (65 MG ELEMENTAL FE) 1 EACH: 325 (65 FE) TAB at 11:01

## 2023-01-24 RX ADMIN — MEROPENEM 1 G: 1 INJECTION, POWDER, FOR SOLUTION INTRAVENOUS at 02:01

## 2023-01-24 RX ADMIN — BACLOFEN 10 MG: 10 TABLET ORAL at 02:01

## 2023-01-24 RX ADMIN — HYDROCODONE BITARTRATE AND ACETAMINOPHEN 1 TABLET: 10; 325 TABLET ORAL at 06:01

## 2023-01-24 RX ADMIN — POLYETHYLENE GLYCOL 3350 17 G: 17 POWDER, FOR SOLUTION ORAL at 02:01

## 2023-01-24 RX ADMIN — MUPIROCIN: 20 OINTMENT TOPICAL at 08:01

## 2023-01-24 RX ADMIN — PANTOPRAZOLE SODIUM 40 MG: 40 TABLET, DELAYED RELEASE ORAL at 06:01

## 2023-01-24 RX ADMIN — SODIUM CHLORIDE, PRESERVATIVE FREE 10 ML: 5 INJECTION INTRAVENOUS at 11:01

## 2023-01-24 RX ADMIN — BACLOFEN 10 MG: 10 TABLET ORAL at 11:01

## 2023-01-24 RX ADMIN — SODIUM CHLORIDE, PRESERVATIVE FREE 10 ML: 5 INJECTION INTRAVENOUS at 06:01

## 2023-01-24 RX ADMIN — SODIUM CHLORIDE, PRESERVATIVE FREE 10 ML: 5 INJECTION INTRAVENOUS at 12:01

## 2023-01-24 RX ADMIN — HYDROCODONE BITARTRATE AND ACETAMINOPHEN 1 TABLET: 10; 325 TABLET ORAL at 02:01

## 2023-01-24 RX ADMIN — BACLOFEN 10 MG: 10 TABLET ORAL at 08:01

## 2023-01-24 RX ADMIN — ZOLPIDEM TARTRATE 10 MG: 5 TABLET ORAL at 10:01

## 2023-01-24 RX ADMIN — MUPIROCIN: 20 OINTMENT TOPICAL at 11:01

## 2023-01-24 NOTE — PLAN OF CARE
01/24/23 1024   Discharge Assessment   Assessment Type Discharge Planning Assessment   Confirmed/corrected address, phone number and insurance Yes   Confirmed Demographics Correct on Facesheet   Source of Information patient;family   People in Home parent(s)   Do you expect to return to your current living situation? Yes   Do you have help at home or someone to help you manage your care at home? Yes   Prior to hospitilization cognitive status: Alert/Oriented   Current cognitive status: Alert/Oriented   Walking or Climbing Stairs ambulation difficulty, requires equipment   Mobility Management Wheelchair   Dressing/Bathing bathing difficulty, requires equipment   Dressing/Bathing Management Hospital Bed   Home Accessibility wheelchair accessible   Home Layout Able to live on 1st floor   Equipment Currently Used at Home hospital bed;wheelchair   Readmission within 30 days? No   Patient currently being followed by outpatient case management? No   Do you currently have service(s) that help you manage your care at home? Yes   Name and Contact number of agency [facility]   Do you take prescription medications? Yes   Do you have prescription coverage? Yes   Do you have any problems affording any of your prescribed medications? No   Is the patient taking medications as prescribed? yes   How do you get to doctors appointments? family or friend will provide;health plan transportation   Are you on dialysis? No   Do you take coumadin? No   Discharge Plan A Home Health   Discharge Plan B Home with family   DME Needed Upon Discharge  none   Discharge Plan discussed with: Patient;Parent(s)   Discharge Barriers Identified None     Patient lives with mother in a single story home with ramp upon entrance. Patient reports sister assist in care and has sitters with [facility]. DME in home: Hospital bed, WC. [employer] reports insurance company will be providing patient a new bed in February due to issues with current hospital  bed. Patient goes to wound care every 3 weeks in [place]. Patient would like to resume HH with [facility]. PCP: Dr. Boogie, Pharmacy: [facility]
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
## 2023-01-24 NOTE — PLAN OF CARE
Problem: Infection  Goal: Absence of Infection Signs and Symptoms  1/24/2023 0709 by Paco Lazcano RN  Outcome: Ongoing, Progressing  1/24/2023 0708 by Paco Lazcano RN  Outcome: Ongoing, Progressing  1/24/2023 0708 by Paco Lazcano RN  Outcome: Ongoing, Progressing  1/24/2023 0708 by Paco Lazcano RN  Outcome: Unable to Meet, Plan Revised     Problem: Adult Inpatient Plan of Care  Goal: Plan of Care Review  1/24/2023 0709 by Paco Lazcano RN  Outcome: Ongoing, Progressing  1/24/2023 0708 by Paco Lazcano RN  Outcome: Ongoing, Progressing  1/24/2023 0708 by Paco Lazcano RN  Outcome: Ongoing, Progressing  1/24/2023 0708 by Paco Lazcano RN  Outcome: Unable to Meet, Plan Revised  Goal: Patient-Specific Goal (Individualized)  1/24/2023 0709 by Paco Lazcano RN  Outcome: Ongoing, Progressing  1/24/2023 0708 by Paco Lazcano RN  Outcome: Ongoing, Progressing  1/24/2023 0708 by Paco Lazcano RN  Outcome: Ongoing, Progressing  1/24/2023 0708 by Paco Lazcano RN  Outcome: Unable to Meet, Plan Revised  Goal: Absence of Hospital-Acquired Illness or Injury  1/24/2023 0709 by Paco Lazcano RN  Outcome: Ongoing, Progressing  1/24/2023 0708 by Paco Lazcano RN  Outcome: Ongoing, Progressing  1/24/2023 0708 by Paco Lazcano RN  Outcome: Ongoing, Progressing  1/24/2023 0708 by Paco Lazcano RN  Outcome: Unable to Meet, Plan Revised  Goal: Optimal Comfort and Wellbeing  1/24/2023 0709 by Paco Lazcano RN  Outcome: Ongoing, Progressing  1/24/2023 0708 by Paco Lazcano RN  Outcome: Ongoing, Progressing  1/24/2023 0708 by Paco Lazcano RN  Outcome: Ongoing, Progressing  1/24/2023 0708 by Paco Lazcano RN  Outcome: Unable to Meet, Plan Revised  Goal: Readiness for Transition of Care  1/24/2023 0709 by Paco Lazcano RN  Outcome: Ongoing, Progressing  1/24/2023 0708 by Paco Lazcano RN  Outcome: Ongoing, Progressing  1/24/2023 0708 by Paco Lazcano RN  Outcome: Ongoing,  Progressing  1/24/2023 0708 by Paco Lazcano, RN  Outcome: Unable to Meet, Plan Revised
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
## 2023-01-24 NOTE — ANESTHESIA POSTPROCEDURE EVALUATION
Anesthesia Post Evaluation    Patient: Modesto Payton    Procedure(s) Performed: Procedure(s) (LRB):  EGD (ESOPHAGOGASTRODUODENOSCOPY) (N/A)  EGD,WITH HEMORRHAGE CONTROL  EGD, WITH CLOSED BIOPSY    Final Anesthesia Type: general      Patient location during evaluation: PACU  Patient participation: Yes- Able to Participate  Level of consciousness: awake and alert  Post-procedure vital signs: reviewed and stable  Pain management: adequate  Airway patency: patent    PONV status at discharge: No PONV, nausea (controlled) and vomiting (controlled)  Anesthetic complications: no      Cardiovascular status: blood pressure returned to baseline and stable  Respiratory status: unassisted  Hydration status: euvolemic  Follow-up not needed.          Vitals Value Taken Time   /66 01/23/23 1105   Temp ** 01/24/23 1322   Pulse 87 01/23/23 1106   Resp 15 01/23/23 1106   SpO2 100 % 01/23/23 1106   Vitals shown include unvalidated device data.      No case tracking events are documented in the log.      Pain/Jonah Score: Pain Rating Prior to Med Admin: 9 (1/24/2023  6:16 AM)  Pain Rating Post Med Admin: 0 (1/23/2023 11:15 PM)  Jonah Score: 9 (1/23/2023 10:43 AM)
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
## 2023-01-25 LAB
ANION GAP SERPL CALC-SCNC: 6 MEQ/L
BACTERIA BLD CULT: NORMAL
BACTERIA BLD CULT: NORMAL
BASOPHILS # BLD AUTO: 0.01 X10(3)/MCL (ref 0–0.2)
BASOPHILS NFR BLD AUTO: 0.2 %
BUN SERPL-MCNC: 6 MG/DL (ref 8.9–20.6)
CALCIUM SERPL-MCNC: 7.9 MG/DL (ref 8.4–10.2)
CHLORIDE SERPL-SCNC: 108 MMOL/L (ref 98–107)
CO2 SERPL-SCNC: 24 MMOL/L (ref 22–29)
CREAT SERPL-MCNC: 0.4 MG/DL (ref 0.73–1.18)
CREAT/UREA NIT SERPL: 15
EOSINOPHIL # BLD AUTO: 0.02 X10(3)/MCL (ref 0–0.9)
EOSINOPHIL NFR BLD AUTO: 0.3 %
ERYTHROCYTE [DISTWIDTH] IN BLOOD BY AUTOMATED COUNT: 18.7 % (ref 11.5–17)
GFR SERPLBLD CREATININE-BSD FMLA CKD-EPI: >60 MLS/MIN/1.73/M2
GLUCOSE SERPL-MCNC: 96 MG/DL (ref 74–100)
HCT VFR BLD AUTO: 25.4 % (ref 42–52)
HGB BLD-MCNC: 8.1 GM/DL (ref 14–18)
IMM GRANULOCYTES # BLD AUTO: 0.07 X10(3)/MCL (ref 0–0.04)
IMM GRANULOCYTES NFR BLD AUTO: 1.1 %
LYMPHOCYTES # BLD AUTO: 0.99 X10(3)/MCL (ref 0.6–4.6)
LYMPHOCYTES NFR BLD AUTO: 15 %
MCH RBC QN AUTO: 20.1 PG
MCHC RBC AUTO-ENTMCNC: 31.9 MG/DL (ref 33–36)
MCV RBC AUTO: 63.2 FL (ref 80–94)
MONOCYTES # BLD AUTO: 0.46 X10(3)/MCL (ref 0.1–1.3)
MONOCYTES NFR BLD AUTO: 7 %
NEUTROPHILS # BLD AUTO: 5.03 X10(3)/MCL (ref 2.1–9.2)
NEUTROPHILS NFR BLD AUTO: 76.4 %
NRBC BLD AUTO-RTO: 0 %
PLATELET # BLD AUTO: 132 X10(3)/MCL (ref 130–400)
PMV BLD AUTO: ABNORMAL FL
POTASSIUM SERPL-SCNC: 3.4 MMOL/L (ref 3.5–5.1)
RBC # BLD AUTO: 4.02 X10(6)/MCL (ref 4.7–6.1)
SODIUM SERPL-SCNC: 138 MMOL/L (ref 136–145)
WBC # SPEC AUTO: 6.6 X10(3)/MCL (ref 4.5–11.5)

## 2023-01-25 PROCEDURE — 25000003 PHARM REV CODE 250: Performed by: HOSPITALIST

## 2023-01-25 PROCEDURE — 21400001 HC TELEMETRY ROOM

## 2023-01-25 PROCEDURE — 36415 COLL VENOUS BLD VENIPUNCTURE: CPT | Performed by: HOSPITALIST

## 2023-01-25 PROCEDURE — 25000003 PHARM REV CODE 250: Performed by: NURSE PRACTITIONER

## 2023-01-25 PROCEDURE — 63600175 PHARM REV CODE 636 W HCPCS: Performed by: HOSPITALIST

## 2023-01-25 PROCEDURE — 25000003 PHARM REV CODE 250

## 2023-01-25 PROCEDURE — 25000003 PHARM REV CODE 250: Performed by: EMERGENCY MEDICINE

## 2023-01-25 PROCEDURE — 85025 COMPLETE CBC W/AUTO DIFF WBC: CPT | Performed by: HOSPITALIST

## 2023-01-25 PROCEDURE — A4216 STERILE WATER/SALINE, 10 ML: HCPCS | Performed by: EMERGENCY MEDICINE

## 2023-01-25 PROCEDURE — 80048 BASIC METABOLIC PNL TOTAL CA: CPT | Performed by: HOSPITALIST

## 2023-01-25 RX ADMIN — HYDROCODONE BITARTRATE AND ACETAMINOPHEN 1 TABLET: 10; 325 TABLET ORAL at 09:01

## 2023-01-25 RX ADMIN — FERROUS SULFATE TAB 325 MG (65 MG ELEMENTAL FE) 1 EACH: 325 (65 FE) TAB at 09:01

## 2023-01-25 RX ADMIN — ZOLPIDEM TARTRATE 10 MG: 5 TABLET ORAL at 08:01

## 2023-01-25 RX ADMIN — SODIUM CHLORIDE, PRESERVATIVE FREE 10 ML: 5 INJECTION INTRAVENOUS at 06:01

## 2023-01-25 RX ADMIN — MEROPENEM 1 G: 1 INJECTION, POWDER, FOR SOLUTION INTRAVENOUS at 10:01

## 2023-01-25 RX ADMIN — BACLOFEN 10 MG: 10 TABLET ORAL at 03:01

## 2023-01-25 RX ADMIN — PANTOPRAZOLE SODIUM 40 MG: 40 TABLET, DELAYED RELEASE ORAL at 06:01

## 2023-01-25 RX ADMIN — PANTOPRAZOLE SODIUM 40 MG: 40 TABLET, DELAYED RELEASE ORAL at 04:01

## 2023-01-25 RX ADMIN — MEROPENEM 1 G: 1 INJECTION, POWDER, FOR SOLUTION INTRAVENOUS at 06:01

## 2023-01-25 RX ADMIN — SODIUM CHLORIDE, PRESERVATIVE FREE 10 ML: 5 INJECTION INTRAVENOUS at 12:01

## 2023-01-25 RX ADMIN — MEROPENEM 1 G: 1 INJECTION, POWDER, FOR SOLUTION INTRAVENOUS at 03:01

## 2023-01-25 RX ADMIN — BACLOFEN 10 MG: 10 TABLET ORAL at 08:01

## 2023-01-25 RX ADMIN — POLYETHYLENE GLYCOL 3350 17 G: 17 POWDER, FOR SOLUTION ORAL at 03:01

## 2023-01-25 RX ADMIN — BACLOFEN 10 MG: 10 TABLET ORAL at 09:01

## 2023-01-25 RX ADMIN — HYDROCODONE BITARTRATE AND ACETAMINOPHEN 1 TABLET: 10; 325 TABLET ORAL at 10:01
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
## 2023-01-26 LAB
ABS NEUT CALC (OHS): ABNORMAL
ANISOCYTOSIS BLD QL SMEAR: ABNORMAL
BASOPHILS # BLD AUTO: 0.02 X10(3)/MCL (ref 0–0.2)
BASOPHILS NFR BLD AUTO: 0.2 %
EOSINOPHIL # BLD AUTO: 0.04 X10(3)/MCL (ref 0–0.9)
EOSINOPHIL NFR BLD AUTO: 0.5 %
ERYTHROCYTE [DISTWIDTH] IN BLOOD BY AUTOMATED COUNT: 18.6 % (ref 11.5–17)
HCT VFR BLD AUTO: 25.7 % (ref 42–52)
HGB BLD-MCNC: 8 GM/DL (ref 14–18)
HYPOCHROMIA BLD QL SMEAR: ABNORMAL
IMM GRANULOCYTES # BLD AUTO: 0.46 X10(3)/MCL (ref 0–0.04)
IMM GRANULOCYTES NFR BLD AUTO: 5.6 %
LYMPHOCYTES # BLD AUTO: 1.96 X10(3)/MCL (ref 0.6–4.6)
LYMPHOCYTES NFR BLD AUTO: 23.8 %
LYMPHOCYTES NFR BLD MANUAL: 27 % (ref 13–40)
MACROCYTES BLD QL SMEAR: ABNORMAL
MCH RBC QN AUTO: 19.7 PG
MCHC RBC AUTO-ENTMCNC: 31.1 MG/DL (ref 33–36)
MCV RBC AUTO: 63.3 FL (ref 80–94)
MONOCYTES # BLD AUTO: 1.08 X10(3)/MCL (ref 0.1–1.3)
MONOCYTES NFR BLD AUTO: 13.1 %
MONOCYTES NFR BLD MANUAL: 17 % (ref 2–11)
NEUTROPHILS # BLD AUTO: 4.69 X10(3)/MCL (ref 2.1–9.2)
NEUTROPHILS NFR BLD AUTO: 56.8 %
NEUTROPHILS NFR BLD MANUAL: 55 % (ref 47–80)
NRBC BLD AUTO-RTO: 0 %
OVALOCYTES (OLG): ABNORMAL
PLATELET # BLD AUTO: 201 X10(3)/MCL (ref 130–400)
PLATELET # BLD EST: NORMAL 10*3/UL
PMV BLD AUTO: ABNORMAL FL
POIKILOCYTOSIS BLD QL SMEAR: ABNORMAL
PROLYMPHOCYTES # BLD MANUAL: 1 %
RBC # BLD AUTO: 4.06 X10(6)/MCL (ref 4.7–6.1)
RBC MORPH BLD: ABNORMAL
TARGETS BLD QL SMEAR: SLIGHT
VIEW PATHOLOGY REPORT (RELIAPATH): NORMAL
WBC # SPEC AUTO: 8.3 X10(3)/MCL (ref 4.5–11.5)

## 2023-01-26 PROCEDURE — 25000003 PHARM REV CODE 250

## 2023-01-26 PROCEDURE — 21400001 HC TELEMETRY ROOM

## 2023-01-26 PROCEDURE — 85007 BL SMEAR W/DIFF WBC COUNT: CPT | Performed by: INTERNAL MEDICINE

## 2023-01-26 PROCEDURE — 25000003 PHARM REV CODE 250: Performed by: HOSPITALIST

## 2023-01-26 PROCEDURE — 25000003 PHARM REV CODE 250: Performed by: EMERGENCY MEDICINE

## 2023-01-26 PROCEDURE — 63600175 PHARM REV CODE 636 W HCPCS: Performed by: INTERNAL MEDICINE

## 2023-01-26 PROCEDURE — 25000003 PHARM REV CODE 250: Performed by: NURSE PRACTITIONER

## 2023-01-26 PROCEDURE — 25000003 PHARM REV CODE 250: Performed by: INTERNAL MEDICINE

## 2023-01-26 PROCEDURE — 85027 COMPLETE CBC AUTOMATED: CPT | Performed by: INTERNAL MEDICINE

## 2023-01-26 PROCEDURE — A4216 STERILE WATER/SALINE, 10 ML: HCPCS | Performed by: EMERGENCY MEDICINE

## 2023-01-26 RX ADMIN — POLYETHYLENE GLYCOL 3350 17 G: 17 POWDER, FOR SOLUTION ORAL at 09:01

## 2023-01-26 RX ADMIN — HYDROCODONE BITARTRATE AND ACETAMINOPHEN 1 TABLET: 10; 325 TABLET ORAL at 09:01

## 2023-01-26 RX ADMIN — BACLOFEN 10 MG: 10 TABLET ORAL at 09:01

## 2023-01-26 RX ADMIN — SODIUM CHLORIDE, PRESERVATIVE FREE 10 ML: 5 INJECTION INTRAVENOUS at 11:01

## 2023-01-26 RX ADMIN — MEROPENEM 1 G: 1 INJECTION, POWDER, FOR SOLUTION INTRAVENOUS at 10:01

## 2023-01-26 RX ADMIN — SODIUM CHLORIDE, PRESERVATIVE FREE 10 ML: 5 INJECTION INTRAVENOUS at 05:01

## 2023-01-26 RX ADMIN — FERROUS SULFATE TAB 325 MG (65 MG ELEMENTAL FE) 1 EACH: 325 (65 FE) TAB at 09:01

## 2023-01-26 RX ADMIN — MEROPENEM 1 G: 1 INJECTION, POWDER, FOR SOLUTION INTRAVENOUS at 05:01

## 2023-01-26 RX ADMIN — ZOLPIDEM TARTRATE 10 MG: 5 TABLET ORAL at 08:01

## 2023-01-26 RX ADMIN — BACLOFEN 10 MG: 10 TABLET ORAL at 04:01

## 2023-01-26 RX ADMIN — PANTOPRAZOLE SODIUM 40 MG: 40 TABLET, DELAYED RELEASE ORAL at 06:01

## 2023-01-26 RX ADMIN — PANTOPRAZOLE SODIUM 40 MG: 40 TABLET, DELAYED RELEASE ORAL at 04:01

## 2023-01-26 RX ADMIN — SODIUM CHLORIDE, PRESERVATIVE FREE 10 ML: 5 INJECTION INTRAVENOUS at 06:01

## 2023-01-26 NOTE — PLAN OF CARE
Problem: Infection  Goal: Absence of Infection Signs and Symptoms  Outcome: Ongoing, Progressing     Problem: Adult Inpatient Plan of Care  Goal: Plan of Care Review  Outcome: Ongoing, Progressing  Goal: Patient-Specific Goal (Individualized)  Outcome: Ongoing, Progressing  Goal: Absence of Hospital-Acquired Illness or Injury  Outcome: Ongoing, Progressing  Goal: Optimal Comfort and Wellbeing  Outcome: Ongoing, Progressing  Goal: Readiness for Transition of Care  Outcome: Ongoing, Progressing     Problem: Impaired Wound Healing  Goal: Optimal Wound Healing  Outcome: Ongoing, Progressing     Problem: Skin Injury Risk Increased  Goal: Skin Health and Integrity  Outcome: Ongoing, Progressing     Problem: Pain Acute  Goal: Acceptable Pain Control and Functional Ability  Outcome: Ongoing, Progressing
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
## 2023-01-27 VITALS
HEART RATE: 87 BPM | OXYGEN SATURATION: 98 % | DIASTOLIC BLOOD PRESSURE: 79 MMHG | SYSTOLIC BLOOD PRESSURE: 130 MMHG | HEIGHT: 72 IN | WEIGHT: 138 LBS | BODY MASS INDEX: 18.69 KG/M2 | TEMPERATURE: 99 F | RESPIRATION RATE: 18 BRPM

## 2023-01-27 PROBLEM — N13.9 URINARY OBSTRUCTION: Status: ACTIVE | Noted: 2023-01-27

## 2023-01-27 PROCEDURE — 63600175 PHARM REV CODE 636 W HCPCS: Performed by: INTERNAL MEDICINE

## 2023-01-27 PROCEDURE — A4216 STERILE WATER/SALINE, 10 ML: HCPCS | Performed by: EMERGENCY MEDICINE

## 2023-01-27 PROCEDURE — 25000003 PHARM REV CODE 250: Performed by: EMERGENCY MEDICINE

## 2023-01-27 PROCEDURE — 25000003 PHARM REV CODE 250: Performed by: INTERNAL MEDICINE

## 2023-01-27 PROCEDURE — 25000003 PHARM REV CODE 250

## 2023-01-27 PROCEDURE — 25000003 PHARM REV CODE 250: Performed by: HOSPITALIST

## 2023-01-27 RX ORDER — PANTOPRAZOLE SODIUM 40 MG/1
40 TABLET, DELAYED RELEASE ORAL
Qty: 60 TABLET | Refills: 0 | Status: ON HOLD | OUTPATIENT
Start: 2023-01-27 | End: 2023-01-01

## 2023-01-27 RX ORDER — FERROUS SULFATE 325(65) MG
325 TABLET, DELAYED RELEASE (ENTERIC COATED) ORAL DAILY
Qty: 30 TABLET | Refills: 0 | Status: SHIPPED | OUTPATIENT
Start: 2023-01-27 | End: 2023-01-27 | Stop reason: HOSPADM

## 2023-01-27 RX ORDER — POLYETHYLENE GLYCOL 3350 17 G/17G
17 POWDER, FOR SOLUTION ORAL 2 TIMES DAILY PRN
Qty: 28 PACKET | Refills: 0 | Status: SHIPPED | OUTPATIENT
Start: 2023-01-27 | End: 2023-02-10

## 2023-01-27 RX ADMIN — SODIUM CHLORIDE, PRESERVATIVE FREE 10 ML: 5 INJECTION INTRAVENOUS at 01:01

## 2023-01-27 RX ADMIN — SODIUM CHLORIDE, PRESERVATIVE FREE 10 ML: 5 INJECTION INTRAVENOUS at 06:01

## 2023-01-27 RX ADMIN — FERROUS SULFATE TAB 325 MG (65 MG ELEMENTAL FE) 1 EACH: 325 (65 FE) TAB at 09:01

## 2023-01-27 RX ADMIN — SODIUM CHLORIDE, PRESERVATIVE FREE 10 ML: 5 INJECTION INTRAVENOUS at 12:01

## 2023-01-27 RX ADMIN — BACLOFEN 10 MG: 10 TABLET ORAL at 09:01

## 2023-01-27 RX ADMIN — PANTOPRAZOLE SODIUM 40 MG: 40 TABLET, DELAYED RELEASE ORAL at 06:01

## 2023-01-27 RX ADMIN — MEROPENEM 1 G: 1 INJECTION, POWDER, FOR SOLUTION INTRAVENOUS at 01:01
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
## 2023-01-27 NOTE — NURSING
01/27/23 1100        Altered Skin Integrity 05/13/22 1237 Back #2 Other (comment) Full thickness tissue loss with exposed bone, tendon, or muscle. Often includes undermining and tunneling. May extend into muscle and/or supporting structures.   Date First Assessed/Time First Assessed: 05/13/22 1237   Altered Skin Integrity Present on Admission: yes  Location: Back  Wound Number: #2  Is this injury device related?: No  Primary Wound Type: (c) Other (comment)  Description of Altered Skin Integ...   Wound Image    Dressing Appearance Intact;Moist drainage   Drainage Amount Small   Drainage Characteristics/Odor Serosanguineous;No odor   Appearance Pink;Red;Moist   Tissue loss description Partial thickness   Red (%), Wound Tissue Color 100 %   Periwound Area Dry   Wound Edges Jagged   Care Cleansed with:;Wound cleanser   Dressing Non-adherent        Altered Skin Integrity 05/13/22 0107 Sacral spine #6 Other (comment) Full thickness tissue loss with exposed bone, tendon, or muscle. Often includes undermining and tunneling. May extend into muscle and/or supporting structures.   Date First Assessed/Time First Assessed: 05/13/22 0107   Altered Skin Integrity Present on Admission: yes  Location: Sacral spine  Wound Number: #6  Is this injury device related?: No  Primary Wound Type: (c) Other (comment)  Description of Altered Sk...   Wound Image    Dressing Appearance Intact;Moist drainage   Drainage Amount Moderate   Drainage Characteristics/Odor Serosanguineous;No odor   Appearance Red;Moist;Wet   Tissue loss description Partial thickness   Red (%), Wound Tissue Color 100 %   Periwound Area Dry   Wound Edges Jagged   Care Cleansed with:;Wound cleanser   Dressing Changed        Altered Skin Integrity 11/04/22 1203 Left anterior;lower Leg #5 Other (comment)   Date First Assessed/Time First Assessed: 11/04/22 1203   Altered Skin Integrity Present on Admission: yes  Side: Left  Orientation: anterior;lower  Location: Leg  Wound  Number: #5  Is this injury device related?: No  Primary Wound Type: Other (comment)   Wound Image    Dressing Appearance Intact;Dried drainage   Drainage Amount Scant   Drainage Characteristics/Odor Serous;No odor   Appearance Pink;Moist   Tissue loss description Partial thickness   Red (%), Wound Tissue Color 100 %   Periwound Area Dry   Wound Edges Jagged   Wound Length (cm) 4 cm   Wound Width (cm) 1.5 cm   Wound Surface Area (cm^2) 6 cm^2   Care Cleansed with:;Wound cleanser   Dressing Non-adherent        Altered Skin Integrity 12/02/22 1336 Right Elbow #7   Date First Assessed/Time First Assessed: 12/02/22 1336   Altered Skin Integrity Present on Admission: yes  Side: Right  Location: Elbow  Wound Number: #7  Is this injury device related?: No   Wound Image    Dressing Appearance Intact   Drainage Amount Small   Drainage Characteristics/Odor Serosanguineous;No odor   Appearance Red;Moist   Tissue loss description Partial thickness   Red (%), Wound Tissue Color 100 %   Periwound Area   (scaly and moist)   Wound Length (cm) 2 cm   Wound Width (cm) 3 cm   Wound Surface Area (cm^2) 6 cm^2   Care Cleansed with:;Wound cleanser   Dressing Non-adherent        Altered Skin Integrity 12/02/22 1337 Left posterior Thigh #8   Date First Assessed/Time First Assessed: 12/02/22 1337   Altered Skin Integrity Present on Admission: yes  Side: Left  Orientation: posterior  Location: Thigh  Wound Number: #8  Is this injury device related?: No   Wound Image    Dressing Appearance Intact;Dried drainage   Drainage Amount Scant   Drainage Characteristics/Odor Serous;No odor   Appearance Red;Moist   Tissue loss description Partial thickness   Red (%), Wound Tissue Color 100 %   Periwound Area   (scaly and dry)   Wound Edges Jagged   Wound Length (cm) 3 cm   Wound Width (cm) 2 cm   Wound Surface Area (cm^2) 6 cm^2   Care Cleansed with:;Wound cleanser   Dressing Non-adherent        Altered Skin Integrity 01/20/23 1443 Right Heel   Date  First Assessed/Time First Assessed: 01/20/23 8903   Altered Skin Integrity Present on Admission: yes  Side: Right  Location: Heel   Wound Image   (mutiple scattered sm macerated lesions from buttocks to back to hips--see multiple photos)   Dressing Appearance Intact;Moist drainage   Drainage Amount Small   Drainage Characteristics/Odor Serosanguineous;No odor   Appearance Pink;Red  (dry to flaky moist)   Red (%), Wound Tissue Color 100 %   Wound Length (cm) 2 cm   Wound Width (cm) 3 cm   Wound Surface Area (cm^2) 6 cm^2   Care Cleansed with:;Wound cleanser   Dressing Non-adherent   Safety Management   Safety Promotion/Fall Prevention assistive device/personal item within reach;side rails raised x 3   Patient Rounds bed in low position;bed wheels locked;call light in patient/parent reach;clutter free environment maintained;ID band on;placement of personal items at bedside;toileting offered;visualized patient   Positioning   Body Position turned;30 degrees   Head of Bed (HOB) Positioning HOB at 30 degrees   Positioning/Transfer Devices pillows;wedge;in use   Changed out multiple body trunk lesions macerated moist scaly.    Quadriplegic  and completely rigid.   He has been on a low airloss bed this entire visit and is probably going home today.   No new issues noted at this time.    His ostomy is also without issues at this time.    Care concerns with nurse Del Toro.

## 2023-01-27 NOTE — PLAN OF CARE
Problem: Infection  Goal: Absence of Infection Signs and Symptoms  Outcome: Ongoing, Progressing     Problem: Adult Inpatient Plan of Care  Goal: Plan of Care Review  Outcome: Ongoing, Progressing  Goal: Patient-Specific Goal (Individualized)  Outcome: Ongoing, Progressing  Goal: Absence of Hospital-Acquired Illness or Injury  Outcome: Ongoing, Progressing  Goal: Optimal Comfort and Wellbeing  Outcome: Ongoing, Progressing  Goal: Readiness for Transition of Care  Outcome: Ongoing, Progressing     Problem: Impaired Wound Healing  Goal: Optimal Wound Healing  Outcome: Ongoing, Progressing     Problem: Skin Injury Risk Increased  Goal: Skin Health and Integrity  Outcome: Ongoing, Progressing     Problem: Pain Acute  Goal: Acceptable Pain Control and Functional Ability  Outcome: Ongoing, Progressing      Infectious Disease

## 2023-01-27 NOTE — PLAN OF CARE
01/27/23 0823   Final Note   Assessment Type Final Discharge Note   Anticipated Discharge Disposition Home-Health   Hospital Resources/Appts/Education Provided Post-Acute resouces added to AVS   Post-Acute Status   Post-Acute Authorization Home Health   Home Health Status Set-up Complete/Auth obtained  (WESLEY Lewis )
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
## 2023-01-30 ENCOUNTER — PATIENT OUTREACH (OUTPATIENT)
Dept: ADMINISTRATIVE | Facility: CLINIC | Age: 48
End: 2023-01-30
Payer: MEDICARE

## 2023-01-30 ENCOUNTER — PATIENT MESSAGE (OUTPATIENT)
Dept: ADMINISTRATIVE | Facility: HOSPITAL | Age: 48
End: 2023-01-30
Payer: MEDICARE

## 2023-01-30 NOTE — PROGRESS NOTES
C3 nurse attempted to contact Modesto Payton for a TCC post hospital discharge follow up call. No answer. No voicemail available.The patient does not have a scheduled HOSFU appointment. Message sent to PCP staff for assistance with scheduling visit with patient.

## 2023-01-31 NOTE — TELEPHONE ENCOUNTER
----- Message from Bety Loja sent at 1/31/2023  4:04 PM CST -----    ----- Message -----  From: Fanta Solitario MA  Sent: 1/31/2023   1:36 PM CST  To: Bety Loja    Pt must bee seen before 02/03/23. Thanks.       You Just now (1:32 PM)    TT  Pt was called yesterday and scheduled with us for 02/16/23 @ 2:00. Thanks!      Note     MD Tania Teran RN; Chuyita HOLMAN Staff; Elba Ivy 1 hour ago (12:01 PM)      Good afternoon. I do not have the ability to add patients to my schedule. I forwarded this message to my office staff and will include my  to assist with scheduling this TCC with available provider in our office within the TCC time frame. Thanks.      MIGUEL Long MD 2 hours ago (10:36 AM)    JB WILLIS nurse spoke with .Modesto Payton      for a TCC post hospital discharge follow up call. The patient has a scheduled HOSFU appointment with Quita Boogie MD    on 02/26/2023 @ 2PM. D/c 1/27/2023 for tcc apt make apt by 2/3/2023     Tania Ferrera RN 2 hours ago (10:35 AM)    JB WILLIS nurse spoke with .Modesto Payton      for a TCC post hospital discharge follow up call. The patient has a scheduled HOSFU appointment with Quita Boogie MD   on 02/26/2023 @ 2PM. D/c 1/27/2023 for tcc apt make apt by 2/3/2023

## 2023-01-31 NOTE — PROGRESS NOTES
C3 nurse spoke with .Modesto Payton     for a TCC post hospital discharge follow up call. The patient has a scheduled HOS appointment with Quita Boogie MD   on 02/26/2023 @ 2PM. D/c 1/27/2023 for tcc apt make apt by 2/3/2023

## 2023-02-03 ENCOUNTER — HOSPITAL ENCOUNTER (OUTPATIENT)
Dept: WOUND CARE | Facility: HOSPITAL | Age: 48
Discharge: HOME | End: 2023-02-03
Attending: FAMILY MEDICINE
Payer: MEDICARE

## 2023-02-03 VITALS
HEART RATE: 80 BPM | HEIGHT: 72 IN | DIASTOLIC BLOOD PRESSURE: 78 MMHG | BODY MASS INDEX: 18.69 KG/M2 | WEIGHT: 138 LBS | SYSTOLIC BLOOD PRESSURE: 132 MMHG | RESPIRATION RATE: 18 BRPM

## 2023-02-03 DIAGNOSIS — L89.894 PRESSURE INJURY OF LEFT LEG, STAGE 4: ICD-10-CM

## 2023-02-03 DIAGNOSIS — L89.894 PRESSURE INJURY OF RIGHT LEG, STAGE 4: Primary | ICD-10-CM

## 2023-02-03 DIAGNOSIS — L89.154 PRESSURE INJURY OF SACRAL REGION, STAGE 4: ICD-10-CM

## 2023-02-03 DIAGNOSIS — L89.114 PRESSURE INJURY OF RIGHT UPPER BACK, STAGE 4: ICD-10-CM

## 2023-02-03 PROCEDURE — 17250 CHEM CAUT OF GRANLTJ TISSUE: CPT

## 2023-02-03 PROCEDURE — 27000999 HC MEDICAL RECORD PHOTO DOCUMENTATION

## 2023-02-03 PROCEDURE — 99213 OFFICE O/P EST LOW 20 MIN: CPT

## 2023-02-03 RX ORDER — HYDROCODONE BITARTRATE AND ACETAMINOPHEN 10; 325 MG/1; MG/1
1 TABLET ORAL EVERY 8 HOURS PRN
Qty: 63 TABLET | Refills: 0 | Status: SHIPPED | OUTPATIENT
Start: 2023-02-03 | End: 2023-02-24

## 2023-02-03 NOTE — PROGRESS NOTES
Subjective:       Patient ID: Modesto Payton is a 47 y.o. male.    Chief Complaint: No chief complaint on file.    47-year-old  male, with quadriplegia, is here for follow up of his multiple pressure ulcers on his back and extremities.  He was recently in the hospital for a UTI, for 8 days.  His wounds are looking much better today.    Review of Systems   Constitutional: Negative.    Respiratory: Negative.     Cardiovascular: Negative.    Skin:         As documented in the HPI.   All other systems reviewed and are negative.      Objective:    There were no vitals filed for this visit.     Physical Exam  Vitals and nursing note reviewed. Exam conducted with a chaperone present.   Constitutional:       Appearance: Normal appearance.   Cardiovascular:      Rate and Rhythm: Normal rate.   Pulmonary:      Effort: Pulmonary effort is normal.   Skin:     General: Skin is warm and dry.      Comments: There are multiple pressure ulcers along his posterior side, which are improved from his last visit.  Is getting ready to get a new low air loss mattress at home.  This should help his ulcers heal even more quickly.  I did chemical cauterization of his ulcers today.  There was no surgical debridement necessary.   Neurological:      Mental Status: He is alert.     Sacrum    Left posterior thigh    Right posterior thigh    Back    Right lower leg    Right posterior arm    Right elbow    Left anterior lower leg         Assessment:         ICD-10-CM ICD-9-CM   1. Pressure injury of right leg, stage 4  L89.894 707.09     707.24   2. Pressure injury of sacral region, stage 4  L89.154 707.03     707.24   3. Pressure injury of left leg, stage 4  L89.894 707.09     707.24   4. Pressure injury of right upper back, stage 4  L89.114 707.02     707.24         Procedures:   Excisional debridement performed:  [] Yes [x] No   Selective debridement performed:  [] Yes [x] No   Mechanical debridement performed:  [] Yes [x] No    Silver nitrate applied :    [x] Yes [] No   Labs ordered this visit:   [] Yes [x] No   Imaging ordered this visit:   [] Yes [x] No   Tissue pathology and/or culture taken:  [] Yes [x] No     Procedures:  [facility]      PCP - General, Home Health Services     Since 2/3/2023     [phone]     TIMES PER WEEK/DAYS:  ORDERS:  Right lower leg wound: Cleanse with wound cleanser, apply xeroform, 6x6 gentle foam border to be changed daily  Back wound: Cleanse with wound cleanser, apply xeroform, ABD pad, tape to be changed daily  Right posterior upper arm wound: Cleanse with wound cleanser, apply xeroform, 6x6 gentle foam border to be changed daily  Right posterior thigh wound: Cleanse with wound cleanser, apply xeroform, ABD pad, tape to be changed daily  Sacral wound: Cleanse with wound cleanser, apply xeroform, ABD pad, tape to be changed daily  Left posterior thigh wound: Cleanse with wound cleanser and apply xeroform to the wound bed, cover with a gentle foam border dressing, change daily.  Right elbow wound: Cleanse with wound cleanser and apply xeroform to the wound bed, cover with a gentle foam border dressing, change daily.     Patient Orders:  Hydrocodone 10 mg, number 63  He will return in 3 weeks.

## 2023-02-07 ENCOUNTER — OFFICE VISIT (OUTPATIENT)
Dept: FAMILY MEDICINE | Facility: CLINIC | Age: 48
End: 2023-02-07
Payer: MEDICARE

## 2023-02-07 VITALS
WEIGHT: 155 LBS | DIASTOLIC BLOOD PRESSURE: 80 MMHG | SYSTOLIC BLOOD PRESSURE: 100 MMHG | RESPIRATION RATE: 19 BRPM | OXYGEN SATURATION: 99 % | BODY MASS INDEX: 20.99 KG/M2 | HEIGHT: 72 IN | HEART RATE: 100 BPM

## 2023-02-07 DIAGNOSIS — Z09 HOSPITAL DISCHARGE FOLLOW-UP: Primary | ICD-10-CM

## 2023-02-07 PROCEDURE — 99495 TRANSJ CARE MGMT MOD F2F 14D: CPT | Mod: ,,, | Performed by: NURSE PRACTITIONER

## 2023-02-07 PROCEDURE — 99495 TCM SERVICES (MODERATE COMPLEXITY): ICD-10-PCS | Mod: ,,, | Performed by: NURSE PRACTITIONER
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
## 2023-02-08 NOTE — ASSESSMENT & PLAN NOTE
Admission diagnoses:  1) Sepsis syndrome  2.  Complicated UTI  3.  Obstructive uropathy with bilateral hydronephrosis   4. Urinary stricture with urinary retention s/p dilation  5.  Acute kidney injury  6.  Encephalopathy-metabolic  7.  Anemia and thrombocytopenia   8. Quadriplegia    Med rec completed; continue PPI    Follow up:  Urology 2/17/2023 @ 8:00; (Dr. Kelley)  Wound Card: Lesvia [facility]  HH with [facility]  GI: Phani Truong; Sister will call for appt.

## 2023-02-20 ENCOUNTER — TELEPHONE (OUTPATIENT)
Dept: FAMILY MEDICINE | Facility: CLINIC | Age: 48
End: 2023-02-20
Payer: MEDICARE

## 2023-02-20 DIAGNOSIS — K59.00 CONSTIPATION, UNSPECIFIED CONSTIPATION TYPE: Primary | ICD-10-CM

## 2023-02-20 NOTE — TELEPHONE ENCOUNTER
----- Message from Jack Wilson sent at 2/20/2023  3:13 PM CST -----  .Type:  Needs Medical Advice    Who Called: Sowmya with [facility]   Symptoms (please be specific):    How long has patient had these symptoms:    Pharmacy name and phone #:    Would the patient rather a call back or a response via [facility]?   Best Call Back Number: [phone]  Additional Information: She needs to speak with nurse left message the patient was suppose to follow up with a gastrologist, however, the doctor is out on maternity leave so he will need a new referral for a new doctor.

## 2023-02-23 DIAGNOSIS — G47.00 INSOMNIA, UNSPECIFIED TYPE: ICD-10-CM

## 2023-02-23 RX ORDER — TRAZODONE HYDROCHLORIDE 50 MG/1
75 TABLET ORAL NIGHTLY PRN
Qty: 90 TABLET | Refills: 1 | Status: SHIPPED | OUTPATIENT
Start: 2023-02-23 | End: 2023-01-01 | Stop reason: SDUPTHER

## 2023-02-23 NOTE — TELEPHONE ENCOUNTER
----- Message from Bety Loja sent at 2/23/2023  8:43 AM CST -----  Regarding: Med refill  [facility] is requesting a refill on Trazadone 50mg. Take 1 & 1/2 tablets by mouth nightly as needed for insomnia. Qty.90

## 2023-02-24 ENCOUNTER — HOSPITAL ENCOUNTER (OUTPATIENT)
Dept: WOUND CARE | Facility: HOSPITAL | Age: 48
Discharge: HOME | End: 2023-02-24
Attending: FAMILY MEDICINE
Payer: MEDICARE

## 2023-02-24 VITALS
HEIGHT: 72 IN | BODY MASS INDEX: 20.99 KG/M2 | WEIGHT: 155 LBS | HEART RATE: 100 BPM | RESPIRATION RATE: 18 BRPM | DIASTOLIC BLOOD PRESSURE: 78 MMHG | SYSTOLIC BLOOD PRESSURE: 102 MMHG

## 2023-02-24 DIAGNOSIS — L89.154 PRESSURE INJURY OF SACRAL REGION, STAGE 4: ICD-10-CM

## 2023-02-24 DIAGNOSIS — L89.894 PRESSURE INJURY OF LEFT LEG, STAGE 4: ICD-10-CM

## 2023-02-24 DIAGNOSIS — L89.894 PRESSURE INJURY OF RIGHT LEG, STAGE 4: Primary | ICD-10-CM

## 2023-02-24 DIAGNOSIS — L89.114 PRESSURE INJURY OF RIGHT UPPER BACK, STAGE 4: ICD-10-CM

## 2023-02-24 PROCEDURE — 27000999 HC MEDICAL RECORD PHOTO DOCUMENTATION

## 2023-02-24 PROCEDURE — 17250 CHEM CAUT OF GRANLTJ TISSUE: CPT

## 2023-02-24 PROCEDURE — 99213 OFFICE O/P EST LOW 20 MIN: CPT

## 2023-02-24 RX ORDER — HYDROCODONE BITARTRATE AND ACETAMINOPHEN 10; 325 MG/1; MG/1
1 TABLET ORAL EVERY 8 HOURS PRN
Qty: 63 TABLET | Refills: 0 | Status: ON HOLD | OUTPATIENT
Start: 2023-02-24 | End: 2023-01-01 | Stop reason: HOSPADM

## 2023-02-24 NOTE — PROGRESS NOTES
Subjective:       Patient ID: Modesto Payton is a 47 y.o. male.    Chief Complaint: Pressure Ulcer (Right lower leg /Back wound/Right posterior upper arm /Right posterior thigh /Sacrum/Left posterior thigh/Right elbow )    47-year-old black male, quadriplegia, is here for follow up of his multiple pressure wounds on his back side.  They are still slowly improving.  His mother has also noticed a possible fistula on the lower back, possibly some draining urine.  He might have a fistula, from the genitourinary tract, to the cutaneous surface.  The mother denies fever or nausea.    Review of Systems   Constitutional: Negative.    Respiratory: Negative.     Cardiovascular: Negative.    Skin:         As documented in the HPI.   All other systems reviewed and are negative.      Objective:    There were no vitals filed for this visit.     Physical Exam  Vitals reviewed.   Constitutional:       Appearance: Normal appearance.   Cardiovascular:      Rate and Rhythm: Normal rate.   Pulmonary:      Effort: Pulmonary effort is normal.   Skin:     General: Skin is warm and dry.      Comments: The multiple pressure injuries are measuring slightly smaller.  I did chemical cauterization, treating hypergranulation, on all of his back side wounds.  There is an area on his buttock, that appears to be draining clear fluid, possibly urine.  He only has a condum catheter.  The indwelling was taken out, when he had a UTI a few weeks ago.  He was in the hospital in [place], [facility].   Neurological:      Mental Status: He is alert.     Perineum, possible fistula    Left groin, possible genitourinary cutaneous fistula    Left posterior thigh    Right elbow    Sacrum    Right posterior thigh    Right upper posterior arm    Back    Right lower leg         Assessment:         ICD-10-CM ICD-9-CM   1. Pressure injury of right leg, stage 4  L89.894 707.09     707.24   2. Pressure injury of sacral region, stage 4  L89.154 707.03     707.24    3. Pressure injury of left leg, stage 4  L89.894 707.09     707.24   4. Pressure injury of right upper back, stage 4  L89.114 707.02     707.24         Procedures:   Excisional debridement performed:  [] Yes [x] No   Selective debridement performed:  [] Yes [x] No   Mechanical debridement performed:  [] Yes [x] No   Silver nitrate applied :    [x] Yes [] No   Labs ordered this visit:   [] Yes [x] No   Imaging ordered this visit:   [] Yes [x] No   Tissue pathology and/or culture taken:  [] Yes [x] No     Procedures:  HOME HEALTH AGENCY:   [facility]     Since 2/6/2023     [phone]     TIMES PER WEEK/DAYS:  ORDERS:  Right lower leg wound: Cleanse with wound cleanser, apply xeroform, 6x6 gentle foam border to be changed daily  Back wound: Cleanse with wound cleanser, apply xeroform, ABD pad, tape to be changed daily  Right posterior upper arm wound: Cleanse with wound cleanser, apply xeroform, 6x6 gentle foam border to be changed daily  Right posterior thigh wound: Cleanse with wound cleanser, apply xeroform, ABD pad, tape to be changed daily  Sacral wound: Cleanse with wound cleanser, apply xeroform, ABD pad, tape to be changed daily  Left posterior thigh wound: Cleanse with wound cleanser and apply xeroform to the wound bed, cover with a gentle foam border dressing, change daily.  Right elbow wound: Cleanse with wound cleanser and apply xeroform to the wound bed, cover with a gentle foam border dressing, change daily.     Patient Orders:    Fistula noted to left groin and periuneum  Left groin 1 x 1 x 3.0 cm   Perineum 0.8 x 0.5 x 2.3 cm   The mother will call the urologist, for further instructions about the possible fistula.  She will take him to the emergency room, should he develop nausea, fever, etc.    Hydrocodone 10 mg number 63  Returned to the clinic in 3 weeks.

## 2023-03-08 NOTE — TELEPHONE ENCOUNTER
----- Message from eBty Loja sent at 3/8/2023 11:40 AM CST -----  Regarding: Med refill  [facility] @ [address] is requesting a refill on Promethazine 6.25/5 Sol Phar.Qty. 473. Take 5 mls by mouth every 6 hours as needed for nausea.

## 2023-03-15 PROBLEM — G82.50 QUADRIPLEGIA, UNSPECIFIED: Status: ACTIVE | Noted: 2023-01-01

## 2023-03-15 PROBLEM — Z93.3 COLOSTOMY STATUS: Status: ACTIVE | Noted: 2023-01-01
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
## 2023-03-16 NOTE — Clinical Note
Diagnosis: Difficult Kidd catheter placement [[id number]]   Future Attending Provider: IVETTE KULKARNI [[id number]]   Admitting Provider:: IVETTE KULKARNI [[id number]]   Special Needs:: Wheelchair or Bed Bound [2]

## 2023-03-17 PROBLEM — T83.9XXA DIFFICULT FOLEY CATHETER PLACEMENT: Status: ACTIVE | Noted: 2023-01-01
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
## 2023-03-17 NOTE — ANESTHESIA PROCEDURE NOTES
Intubation    Date/Time: 3/17/2023 12:09 PM  Performed by: Beau Kelsey CRNA  Authorized by: Gustavo Arias MD     Intubation:     Induction:  Intravenous    Intubated:  Postinduction    Mask Ventilation:  Easy mask    Attempts:  1    Attempted By:  CRNA    Difficult Airway Encountered?: No      Complications:  None    Airway Device:  Supraglottic airway/LMA    Airway Device Size:  5.0    Style/Cuff Inflation:  Cuffed (inflated to minimal occlusive pressure)    Inflation Amount (mL):  18    Placement Verified By:  Capnometry    Complicating Factors:  None    Findings Post-Intubation:  BS equal bilateral
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
## 2023-03-17 NOTE — TRANSFER OF CARE
"Anesthesia Transfer of Care Note    Patient: Modesto Payton    Procedure(s) Performed: Procedure(s) (LRB):  CYSTOSCOPY (N/A)    Patient location: PACU    Anesthesia Type: general    Transport from OR: Transported from OR on room air with adequate spontaneous ventilation    Post pain: adequate analgesia    Post assessment: no apparent anesthetic complications and tolerated procedure well    Post vital signs: stable    Level of consciousness: awake    Nausea/Vomiting: no nausea/vomiting    Complications: none    Transfer of care protocol was followed      Last vitals:   Visit Vitals  /78   Pulse (!) 120   Temp 36.3 °C (97.3 °F) (Oral)   Resp 18   Ht 6' 2.02" (1.88 m)   Wt 74.8 kg (165 lb)   SpO2 100%   BMI 21.18 kg/m²     "

## 2023-03-17 NOTE — OP NOTE
operative note    Surgeon:  Buzz Koch MD     Date of procedure:  03/17/2023     Preop diagnosis:  Urethral stricture, neurogenic bladder, urinary retention, hydronephrosis, urinary tract infection    Postop diagnosis:  Same     Procedure:  Cystoscopy with complex Kidd catheter placement with urethral dilation    Preoperative history and indication for procedure:  This patient is a 48-year-old black male with quadriplegia and he was found to have hydronephrosis and urinary infection and elevated residual urine.  He was voiding into a collection device externally but had recently had a Kidd catheter up until about a month ago.  Attempt was made to replace a catheter in the office unsuccessfully and he was encouraged to go to the hospital for urologic management.  He was nontoxic in the hospital and renal function was stable.  He was admitted to the Medicine Service with plan for elective cystoscopy and catheter replacement this morning.      Procedure in detail:  After obtaining proper consent the patient was taken to the procedure room.  He was unable to be placed in lithotomy position secondary to flexion contracture.  He was prepped with baby shampoo.  I inserted the flexible cystoscope per urethra and got down to a stricture at the proximal bulbar or membranous urethral area.  There was significant tortuosity to the urethra at this point and anatomy was not perfectly clear in addition to the patient being in a right lateral decubitus position essentially.  I was able to advance a guidewire across the small aperture and confidently into the bladder.  I removed the cystoscope at this point and dilated him from 12-18 [place] Irish with Jessica dilators over the guidewire.  After that was accomplished I inserted the flexible cystoscope alongside the guidewire and into the bladder.  The urine was cloudy and I could not see perfectly well but did not detect any stone or tumor.  The cystoscope was withdrawn and a  16 Hebrew silicone catheter was turned into a [place] Kidd and placed over the guidewire with confidence and the balloon inflated and seated atop the prostate.  The urine was foul-smelling and cloudy but urine culture was already pending.  Revealed normal penis scrotum and testes without phlegmon.  That completed the procedure.  The patient was awakened and extubated and transported to recovery room with stable vital signs and in stable condition.  He will be taken back to his prior location for convalescence.  I think he would benefit from some antibiotics until the culture returns and then he would be able to go home.  He will follow-up with Dr. Kelley for catheter change in the future or further management and for consideration of suprapubic tube.    Buzz Koch MD

## 2023-03-17 NOTE — ANESTHESIA PREPROCEDURE EVALUATION
"                                                                                                             03/17/2023  Modesto Payton is a 48 y.o., male with pmh noted      Pre-op Assessment    I have reviewed the Patient Summary Reports.     I have reviewed the Nursing Notes. I have reviewed the NPO Status.   I have reviewed the Medications.     Review of Systems      Physical Exam  General: Well nourished and Cooperative    Airway:  Mallampati: II   Mouth Opening: Normal  TM Distance: Normal  Tongue: Normal  Neck ROM: Normal ROM    Dental:  Intact    Chest/Lungs:  Clear to auscultation    Heart:  Rate: Normal        Anesthesia Plan  Type of Anesthesia, risks & benefits discussed:    Anesthesia Type: Gen Supraglottic Airway  Intra-op Monitoring Plan: Standard ASA Monitors  Post Op Pain Control Plan: multimodal analgesia  Induction:  IV  Informed Consent: Informed consent signed with the Patient and all parties understand the risks and agree with anesthesia plan.  All questions answered.   ASA Score: 3  Day of Surgery Review of History & Physical: H&P Update referred to the surgeon/provider.    Ready For Surgery From Anesthesia Perspective.     .  I explained anesthesia plan to patient/responsbile party if available.  Anesthesia consent done going over the material facts, risks, complications & alternatives, obtained which includes the possibility of altering the anesthesia plan.  I reviewed problem list, appropriate labs, any workup, Xray, EKG etc noted below.  Patients condition is satisfactory to proceed with anesthesia plan unless otherwise noted (see anesthesia chart for details of the anesthesia plan carried out).      Pre-operative evaluation for Procedure(s) (LRB):  CYSTOSCOPY (N/A)    /74   Pulse (!) 116   Temp 36.3 °C (97.3 °F) (Oral)   Resp (!) 22   Ht 6' 2.02" (1.88 m)   Wt 74.8 kg (165 lb)   SpO2 98%   BMI 21.18 kg/m²     Patient Active Problem List   Diagnosis    Pressure injury of " sacral region, stage 4    Pressure injury of left leg, stage 4    Pressure injury of right leg, stage 4    Pressure injury of right upper back, stage 4    Peripheral vascular disease, unspecified    Urinary obstruction    Hospital discharge follow-up    Quadriplegia, unspecified    Colostomy status       Review of patient's allergies indicates:   Allergen Reactions    Iodine     Penicillins     Sulfur        Current Outpatient Medications   Medication Instructions    baclofen (LIORESAL) 10 mg, Oral, 3 times daily    ferrous gluconate (FERGON) 324 mg, Oral, With breakfast    HYDROcodone-acetaminophen (NORCO)  mg per tablet 1 tablet, Oral, Every 8 hours PRN    pantoprazole (PROTONIX) 40 mg, Oral, 2 times daily before meals    promethazine (PHENERGAN) 6.25 mg, Oral, Every 6 hours PRN    traZODone (DESYREL) 75 mg, Oral, Nightly PRN    zolpidem (AMBIEN) 10 mg, Oral, Nightly PRN       Past Surgical History:   Procedure Laterality Date    EGD, WITH CLOSED BIOPSY  1/23/2023    Procedure: EGD, WITH CLOSED BIOPSY;  Surgeon: Phani Truong MD;  Location: [facility] ENDOSCOPY;  Service: Gastroenterology;;    EGD, WITH HEMORRHAGE CONTROL  1/23/2023    Procedure: EGD,WITH HEMORRHAGE CONTROL;  Surgeon: Phani Truong MD;  Location: [facility] ENDOSCOPY;  Service: Gastroenterology;;    ESOPHAGOGASTRODUODENOSCOPY N/A 1/23/2023    Procedure: EGD (ESOPHAGOGASTRODUODENOSCOPY);  Surgeon: Phani Truong MD;  Location: [facility] ENDOSCOPY;  Service: Gastroenterology;  Laterality: N/A;    FLAP PROCEDURE      for PU     multiple surgicla debridements      NECK SURGERY      spinal fusion        Social History     Socioeconomic History    Marital status: Unknown   Tobacco Use    Smoking status: Never    Smokeless tobacco: Never   Substance and Sexual Activity    Alcohol use: Never    Drug use: Not Currently       Lab Results   Component Value Date    WBC 24.8 (H) 03/17/2023    HGB 10.1 (L) 03/17/2023    HCT  35.2 (L) 03/17/2023    MCV 66.3 (L) 03/17/2023     (H) 03/17/2023          BMP  Lab Results   Component Value Date    HCT 35.2 (L) 03/17/2023     03/17/2023    K 4.2 03/17/2023    BUN 12.6 03/17/2023    CREATININE 0.70 (L) 03/17/2023    CALCIUM 9.0 03/17/2023        INR  No results for input(s): PT, INR, PROTIME, APTT in the last 72 hours.        Diagnostic Studies:      EKG:  Results for orders placed or performed during the hospital encounter of 01/19/23   EKG 12-lead    Collection Time: 01/19/23  6:53 PM    Narrative    Test Reason : R00.0,    Vent. Rate : 129 BPM     Atrial Rate : 129 BPM     P-R Int : 122 ms          QRS Dur : 086 ms      QT Int : 312 ms       P-R-T Axes : 059 056 027 degrees     QTc Int : 457 ms    Sinus tachycardia  Biatrial enlargement  LVH with repolarization abnormality  Abnormal ECG  No previous ECGs available  Confirmed by Laurent Covarrubias MD ([id number]) on 1/21/2023 3:24:34 AM    Referred By: [email or web address]   SELF           Confirmed By:Laurent Covarrubias MD

## 2023-03-17 NOTE — NURSING
Inpatient consult reiceved for multiple wounds. Patient unavailable for assessment in cysto currently. Photos, chart reviewed. Wound care orders placed for xeroform to all open wounds, cover with abd pads, secure with cloth tape, change daily and as needed. Pressure prevention measures and specialty bed ordered. Plan follow up.

## 2023-03-17 NOTE — PROGRESS NOTES
Pharmacokinetic Initial Assessment: IV Vancomycin    Assessment/Plan:    Start patient on vancomycin 1000 mg IV every 12 hours  Desired empiric serum trough concentration is 15 to 20 mcg/mL  Draw vancomycin trough level 60 min prior to fourth dose on 03/18 at approximately 2300  Pharmacy will continue to follow and monitor vancomycin.      Please contact pharmacy at extension [phone] with any questions regarding this assessment.     Thank you for the consult,   Emanuel Estevez       Patient brief summary:  Modesto Payton is a 48 y.o. male initiated on antimicrobial therapy with IV Vancomycin for treatment of suspected skin & soft tissue infection **Patient is quadriplegic which may effect regimen**    Drug Allergies:   Review of patient's allergies indicates:   Allergen Reactions    Iodine     Penicillins     Sulfur        Actual Body Weight:   75 kg    Renal Function:   Estimated Creatinine Clearance: 138 mL/min (A) (based on SCr of 0.7 mg/dL (L)).,     Dialysis Method (if applicable):  N/A    CBC (last 72 hours):  Recent Labs   Lab Result Units 03/17/23  0144   WBC x10(3)/mcL 24.8*   Hgb g/dL 10.1*   Hct % 35.2*   Platelet x10(3)/mcL 495*   Mono % % 0.3   Eos % % 0.0   Basophil % % 0.2       Metabolic Panel (last 72 hours):  Recent Labs   Lab Result Units 03/16/23  2253 03/17/23  0149   Sodium Level mmol/L  --  141   Potassium Level mmol/L  --  4.2   Chloride mmol/L  --  108*   Carbon Dioxide mmol/L  --  19*   Glucose Level mg/dL  --  78   Glucose, UA mg/dL Negative  --    Blood Urea Nitrogen mg/dL  --  12.6   Creatinine mg/dL  --  0.70*   Albumin Level g/dL  --  3.3*   Bilirubin Total mg/dL  --  0.6   Alkaline Phosphatase unit/L  --  149   Aspartate Aminotransferase unit/L  --  15   Alanine Aminotransferase unit/L  --  9       Drug levels (last 3 results):  No results for input(s): VANCOMYCINRA, VANCORANDOM, VANCOMYCINPE, VANCOPEAK, VANCOMYCINTR, VANCOTROUGH in the last 72 hours.    Microbiologic  Results:  Microbiology Results (last 7 days)       Procedure Component Value Units Date/Time    Urine culture [[id number]] Collected: 03/16/23 0372    Order Status: Sent Specimen: Urine Updated: 03/16/23 6477
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
## 2023-03-18 NOTE — PROGRESS NOTES
progress note    Patient postop day 1 with cystoscopy and complex Kidd catheter placement.    He remains afebrile with labile blood pressure    Catheter draining adequately and urine cloudy with a culture pending.  Showing [id number] g negative rods with identification and sensitivities pending.    Patient does not appear toxic from bacteriuria and this more likely represents colonization and probably does not require specific antibiotic coverage.  He does have leukocytosis and chronic anemia.  Renal function is normal.  He is slightly acidotic.    Catheter adequately placed and expect this to suffice for the time being but change our removal of catheter potentially fraught with difficulty.    Please re-consult if we may be of assistance.  Patient should follow-up with our office for catheter change in 4-6 weeks and to discuss future management of urine stream with consideration for suprapubic tube or possibly even urinary diversion.    Buzz Koch MD

## 2023-03-18 NOTE — NURSING
Nurses Note -- 4 Eyes      3/17/2023  2055      Skin assessed during: Admit      [] No Pressure Injuries Present    []Prevention Measures Documented      [x] Yes- Altered Skin Integrity Present or Discovered   [x] LDA Added if Not in Epic (Describe Wound)   [x] New Altered Skin Integrity was Present on Admit and Documented in LDA   [x] Wound Image Taken    Wound Care Consulted? Yes    Attending Nurse:  Jimmy Live RN     Second RN/Staff Member:  Marielena Carnes LPN
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
## 2023-03-20 PROBLEM — N39.0 UTI (URINARY TRACT INFECTION): Status: ACTIVE | Noted: 2023-01-01
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
## 2023-03-20 NOTE — PLAN OF CARE
03/20/23 1025   Final Note   Assessment Type Final Discharge Note   Anticipated Discharge Disposition Home-Health   Hospital Resources/Appts/Education Provided Post-Acute resouces added to AVS   Post-Acute Status   Post-Acute Authorization Home Health   Home Health Status Set-up Complete/Auth obtained  (Resume with Debbie)   Discharge Delays None known at this time

## 2023-03-21 NOTE — PROGRESS NOTES
C3 nurse attempted to contact Modesto Payton  for a TCC post hospital discharge follow up call. No answer. Reached no vm. The patient does not have a scheduled HOSFU appointment. Message sent to PCP staff for assistance with scheduling visit with patient.

## 2023-03-22 NOTE — PROGRESS NOTES
C3 nurse spoke with Modesto Payton for a TCC post hospital discharge follow up call. The patient has a scheduled HOSFU appointment with Quita Boogie MD on 3/30/23 @ 1:00pm.    Patient states he is also taking Hydrocodone 10mg every 6-8 hrs as needed for pain.  Medication unable to be reconciled as it is not on patient's medication list.

## 2023-03-24 NOTE — TELEPHONE ENCOUNTER
Called pt to ask if he would call the back of his insurance so I can fax his dentist referral. Pt said he would call back or message in the [facility] efrain.
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
## 2023-03-31 NOTE — PROGRESS NOTES
"   Subjective:       Patient ID: Modesto Payton is a 48 y.o. male.    Chief Complaint: Pressure Ulcer (Right lower leg /Back wound/Right posterior upper arm /Right posterior thigh /Sacrum/Left posterior thigh/Right elbow /Left toe, 2nd (NEW))    48-year-old black male, with quadriplegia, who is here for follow up of his chronic stage IV pressure ulcers on his back side.  He has a new open wound on the left 2nd toe today.  It was recently in the hospital, for 1 week, for treatment with cystoscopy, placement of an indwelling Kidd catheter, and IV antibiotics for a UTI.  He is doing well now.  He saw a PCP yesterday.  I have read her note.  Overall, his wounds are improving.  Is trying to get a new mattress in a few months.    Review of Systems   Constitutional: Negative.    Respiratory: Negative.     Cardiovascular: Negative.    Skin:         As documented in the HPI.   All other systems reviewed and are negative.      Objective:      Vitals:    03/31/23 1203   BP: (!) 140/81   Pulse: 80   Resp: 20   Weight: 74.8 kg (165 lb)   Height: 6' 2" (1.88 m)        Physical Exam  Vitals and nursing note reviewed. Exam conducted with a chaperone present.   Constitutional:       Appearance: Normal appearance.   Cardiovascular:      Rate and Rhythm: Normal rate.   Pulmonary:      Effort: Pulmonary effort is normal.   Skin:     General: Skin is warm and dry.      Comments: Overall, his wounds are improving.  There was no debridement necessary, but I did apply silver nitrate across his wounds, including the new wound on the 2nd toe.  He tolerated this well.  His wounds were dressed with Xeroform.   Neurological:      Mental Status: He is alert.     Left 2nd toe    Right lower leg    Sacrum    Right posterior thigh    Back    Left posterior thigh    Right elbow       Altered Skin Integrity 05/13/22 1237 Back #2 Other (comment) Full thickness tissue loss with exposed bone, tendon, or muscle. Often includes undermining and " tunneling. May extend into muscle and/or supporting structures. (Active)   05/13/22 1237   Altered Skin Integrity Present on Admission - Did Patient arrive to the hospital with altered skin?: yes   Side:    Orientation:    Location: Back   Wound Number: #2   Is this injury device related?: No   Primary Wound Type: Other   Description of Altered Skin Integrity: Full thickness tissue loss with exposed bone, tendon, or muscle. Often includes undermining and tunneling. May extend into muscle and/or supporting structures.   Ankle-Brachial Index:    Pulses:    Removal Indication and Assessment:    Wound Outcome:    (Retired) Wound Length (cm):    (Retired) Wound Width (cm):    (Retired) Depth (cm):    Wound Description (Comments):    Removal Indications:    Wound Length (cm) 21.5 cm 03/31/23 1140   Wound Width (cm) 22 cm 03/31/23 1140   Wound Depth (cm) 0.2 cm 03/31/23 1140   Wound Volume (cm^3) 94.6 cm^3 03/31/23 1140   Wound Surface Area (cm^2) 473 cm^2 03/31/23 1140            Altered Skin Integrity 05/13/22 1247 Right posterior Thigh #4 Other (comment) Full thickness tissue loss. Subcutaneous fat may be visible but bone, tendon or muscle are not exposed (Active)   05/13/22 1247   Altered Skin Integrity Present on Admission - Did Patient arrive to the hospital with altered skin?: yes   Side: Right   Orientation: posterior   Location: Thigh   Wound Number: #4   Is this injury device related?: No   Primary Wound Type: Other   Description of Altered Skin Integrity: Full thickness tissue loss. Subcutaneous fat may be visible but bone, tendon or muscle are not exposed   Ankle-Brachial Index:    Pulses:    Removal Indication and Assessment:    Wound Outcome:    (Retired) Wound Length (cm):    (Retired) Wound Width (cm):    (Retired) Depth (cm):    Wound Description (Comments):    Removal Indications:    Wound Length (cm) 27.4 cm 03/31/23 1140   Wound Width (cm) 12.9 cm 03/31/23 1140   Wound Depth (cm) 0.2 cm 03/31/23 1140    Wound Volume (cm^3) 70.692 cm^3 03/31/23 1140   Wound Surface Area (cm^2) 353.46 cm^2 03/31/23 1140            Altered Skin Integrity 05/13/22 0107 Sacral spine #6 Other (comment) Full thickness tissue loss with exposed bone, tendon, or muscle. Often includes undermining and tunneling. May extend into muscle and/or supporting structures. (Active)   05/13/22 0107   Altered Skin Integrity Present on Admission - Did Patient arrive to the hospital with altered skin?: yes   Side:    Orientation:    Location: Sacral spine   Wound Number: #6   Is this injury device related?: No   Primary Wound Type: Other   Description of Altered Skin Integrity: Full thickness tissue loss with exposed bone, tendon, or muscle. Often includes undermining and tunneling. May extend into muscle and/or supporting structures.   Ankle-Brachial Index:    Pulses:    Removal Indication and Assessment:    Wound Outcome:    (Retired) Wound Length (cm):    (Retired) Wound Width (cm):    (Retired) Depth (cm):    Wound Description (Comments):    Removal Indications:    Wound Length (cm) 20 cm 03/31/23 1140   Wound Width (cm) 10.6 cm 03/31/23 1140   Wound Depth (cm) 0.2 cm 03/31/23 1140   Wound Volume (cm^3) 42.4 cm^3 03/31/23 1140   Wound Surface Area (cm^2) 212 cm^2 03/31/23 1140            Altered Skin Integrity 12/02/22 1337 Left posterior Thigh #8 (Active)   12/02/22 1337   Altered Skin Integrity Present on Admission - Did Patient arrive to the hospital with altered skin?: yes   Side: Left   Orientation: posterior   Location: Thigh   Wound Number: #8   Is this injury device related?: No   Primary Wound Type:    Description of Altered Skin Integrity:    Ankle-Brachial Index:    Pulses:    Removal Indication and Assessment:    Wound Outcome:    (Retired) Wound Length (cm):    (Retired) Wound Width (cm):    (Retired) Depth (cm):    Wound Description (Comments):    Removal Indications:    Wound Length (cm) 0.9 cm 03/31/23 1140   Wound Width (cm) 0.5 cm  03/31/23 1140   Wound Depth (cm) 0.2 cm 03/31/23 1140   Wound Volume (cm^3) 0.09 cm^3 03/31/23 1140   Wound Surface Area (cm^2) 0.45 cm^2 03/31/23 1140            Altered Skin Integrity 03/18/23 0545 Right posterior Elbow (Active)   03/18/23 0545   Altered Skin Integrity Present on Admission - Did Patient arrive to the hospital with altered skin?: yes   Side: Right   Orientation: posterior   Location: Elbow   Wound Number:    Is this injury device related?:    Primary Wound Type:    Description of Altered Skin Integrity:    Ankle-Brachial Index:    Pulses:    Removal Indication and Assessment:    Wound Outcome:    (Retired) Wound Length (cm):    (Retired) Wound Width (cm):    (Retired) Depth (cm):    Wound Description (Comments):    Removal Indications:    Wound Length (cm) 1.9 cm 03/31/23 1140   Wound Width (cm) 1 cm 03/31/23 1140   Wound Depth (cm) 0.2 cm 03/31/23 1140   Wound Volume (cm^3) 0.38 cm^3 03/31/23 1140   Wound Surface Area (cm^2) 1.9 cm^2 03/31/23 1140            Altered Skin Integrity 03/31/23 1156 Right lower Leg (Active)   03/31/23 1156   Altered Skin Integrity Present on Admission - Did Patient arrive to the hospital with altered skin?: yes   Side: Right   Orientation: lower   Location: Leg   Wound Number:    Is this injury device related?: No   Primary Wound Type:    Description of Altered Skin Integrity:    Ankle-Brachial Index:    Pulses:    Removal Indication and Assessment:    Wound Outcome:    (Retired) Wound Length (cm):    (Retired) Wound Width (cm):    (Retired) Depth (cm):    Wound Description (Comments):    Removal Indications:    Dressing Appearance Moist drainage;Intact 03/31/23 1140   Drainage Amount Moderate 03/31/23 1140   Drainage Characteristics/Odor Serosanguineous 03/31/23 1140   Appearance Intact;Slough;Moist;Epithelialization;Granulating 03/31/23 1140   Wound Length (cm) 3.5 cm 03/31/23 1140   Wound Width (cm) 1.7 cm 03/31/23 1140   Wound Depth (cm) 0.2 cm 03/31/23 1140    Wound Volume (cm^3) 1.19 cm^3 03/31/23 1140   Wound Surface Area (cm^2) 5.95 cm^2 03/31/23 1140            Altered Skin Integrity 03/31/23 1209 Left distal Toe, second (Active)   03/31/23 1209   Altered Skin Integrity Present on Admission - Did Patient arrive to the hospital with altered skin?: yes   Side: Left   Orientation: distal   Location: Toe, second   Wound Number:    Is this injury device related?: No   Primary Wound Type:    Description of Altered Skin Integrity:    Ankle-Brachial Index:    Pulses:    Removal Indication and Assessment:    Wound Outcome:    (Retired) Wound Length (cm):    (Retired) Wound Width (cm):    (Retired) Depth (cm):    Wound Description (Comments):    Removal Indications:    Wound Length (cm) 0.7 cm 03/31/23 1140   Wound Width (cm) 1.1 cm 03/31/23 1140   Wound Depth (cm) 0.2 cm 03/31/23 1140   Wound Volume (cm^3) 0.154 cm^3 03/31/23 1140   Wound Surface Area (cm^2) 0.77 cm^2 03/31/23 1140       [REMOVED]      Altered Skin Integrity 03/18/23 0545 Left upper;posterior Arm (Removed)   03/18/23 0545   Altered Skin Integrity Present on Admission - Did Patient arrive to the hospital with altered skin?: yes   Side: Left   Orientation: upper;posterior   Location: Arm   Wound Number:    Is this injury device related?:    Primary Wound Type:    Description of Altered Skin Integrity:    Ankle-Brachial Index:    Pulses:    Removal Indication and Assessment:    Wound Outcome: Healed   (Retired) Wound Length (cm):    (Retired) Wound Width (cm):    (Retired) Depth (cm):    Wound Description (Comments):    Removal Indications:    Removed 03/31/23 1155   Wound Length (cm) 0 cm 03/31/23 1140   Wound Width (cm) 0 cm 03/31/23 1140   Wound Depth (cm) 0 cm 03/31/23 1140   Wound Volume (cm^3) 0 cm^3 03/31/23 1140   Wound Surface Area (cm^2) 0 cm^2 03/31/23 1140         Assessment:         ICD-10-CM ICD-9-CM   1. Pressure injury of right leg, stage 4  L89.894 707.09     707.24   2. Pressure injury of  sacral region, stage 4  L89.154 707.03     707.24   3. Pressure injury of left leg, stage 4  L89.894 707.09     707.24   4. Pressure injury of right upper back, stage 4  L89.114 707.02     707.24   5. Quadriplegia  G82.50 344.00         Procedures:   Excisional debridement performed:  [] Yes [x] No   Selective debridement performed:  [] Yes [x] No   Mechanical debridement performed:  [] Yes [x] No   Silver nitrate applied :    [x] Yes [] No   Labs ordered this visit:   [] Yes [x] No   Imaging ordered this visit:   [] Yes [x] No   Tissue pathology and/or culture taken:  [] Yes [x] No     Procedures:  HOME HEALTH AGENCY:  [facility]     Since 2/6/2023     [phone]     TIMES PER WEEK/DAYS:  ORDERS:  Right lower leg wound: Cleanse with wound cleanser, apply xeroform, 6x6 gentle foam border to be changed daily  Back wound: Cleanse with wound cleanser, apply xeroform, ABD pad, tape to be changed daily  Right posterior upper arm wound: Cleanse with wound cleanser, apply xeroform, 6x6 gentle foam border to be changed daily  Right posterior thigh wound: Cleanse with wound cleanser, apply xeroform, ABD pad, tape to be changed daily  Sacral wound: Cleanse with wound cleanser, apply xeroform, ABD pad, tape to be changed daily  Left posterior thigh wound: Cleanse with wound cleanser and apply xeroform to the wound bed, cover with a gentle foam border dressing, change daily.  Right elbow wound: Cleanse with wound cleanser and apply xeroform to the wound bed, cover with a gentle foam border dressing, change daily.   Left toe, 2nd- Cleanse with wound cleanser and apply silver alginate to wound bed and cover with a dry dressing to be changed daily.     Patient Orders:  Fistula noted to left groin and periuneum  Left groin 1 x 1 x 3.0 cm   Perineum 0.8 x 0.5 x 2.3 cm

## 2023-04-20 NOTE — TELEPHONE ENCOUNTER
----- Message from Bety Loja sent at 4/20/2023  9:45 AM CDT -----  Regarding: Med refill  [facility] @ [address] is requesting a refill on Ambien 10mg. Take 1 tablet by mouth @ bedtime as needed for sleep. Qty.30

## 2023-04-27 NOTE — TELEPHONE ENCOUNTER
----- Message from April Stewart sent at 4/26/2023 10:04 AM CDT -----  Regarding: med advice  .Type:  Needs Medical Advice    Who Called: [facility] (Home Health)  Symptoms (please be specific):    How long has patient had these symptoms:    Pharmacy name and phone #:    Would the patient rather a call back or a response via [facility]? Call back  Best Call Back Number: fax: [phone]; [phone]  Additional Information: patient stated he was given urinalysis orders but lost them. They would like to know if those orders can be faxed to their office for the patient.

## 2023-04-28 NOTE — PROGRESS NOTES
Subjective:       Patient ID: Modesto Payton is a 48 y.o. male.    Chief Complaint: No chief complaint on file.    8-year-old black male, quadriplegia, who is here for follow up of the multiple pressure ulcers on his back side.  He is waiting for a new air loss mattress to arrive in June.  He will be using an egg crate mattress in the meantime.  The ulcers have improved slightly since his last visit, 1 month ago.  He has no new complaints.  Will receive new bed/mattress 06/01/2023  Recommended patient get egg crate for current mattress now d/t the mattress deflating   Need refill on pain medicine    Review of Systems   Constitutional: Negative.    Respiratory: Negative.     Cardiovascular: Negative.    Skin:         As documented in the HPI.   All other systems reviewed and are negative.      Objective:    There were no vitals filed for this visit.     Physical Exam  Vitals and nursing note reviewed. Exam conducted with a chaperone present.   Constitutional:       Appearance: Normal appearance.   Cardiovascular:      Rate and Rhythm: Normal rate.   Pulmonary:      Effort: Pulmonary effort is normal.   Skin:     General: Skin is warm and dry.      Comments: There are multiple pressure ulcers, stage IV, on his back side, including lower back.  I used silver nitrate to treat the hypergranulation and cauterize the open wounds.  There was no surgical debridement done.   Neurological:      Mental Status: He is alert.     Right lower leg    Right posterior pelvis    Sacrum    Right posterior thigh    Lower back         Assessment:         ICD-10-CM ICD-9-CM   1. Pressure injury of right leg, stage 4  L89.894 707.09     707.24   2. Pressure injury of sacral region, stage 4  L89.154 707.03     707.24   3. Pressure injury of left leg, stage 4  L89.894 707.09     707.24   4. Pressure injury of right upper back, stage 4  L89.114 707.02     707.24         Procedures:   Excisional debridement performed:  [] Yes [x] No    Selective debridement performed:  [] Yes [x] No   Mechanical debridement performed:  [] Yes [x] No   Silver nitrate applied :    [x] Yes [] No   Labs ordered this visit:   [] Yes [x] No   Imaging ordered this visit:   [] Yes [x] No   Tissue pathology and/or culture taken:  [] Yes [x] No     Procedures:  HOME HEALTH AGENCY:  [facility]     Since 2/6/2023     [phone]     TIMES PER WEEK/DAYS:  ORDERS:  Right posterior elbow wound: Cleanse with wound cleanser, apply xeroform, and gentle foam border dressing to be changed daily   Back wound: Cleanse with wound cleanser, apply xeroform, ABD pad, and tape to be changed daily   Right lower leg wound: Cleanse with wound cleanser, apply xeroform, and gentle foam border dressing to be changed daily   Right posterior thigh wound: Cleanse with wound cleanser, apply xeroform, ABD pad, and tape to be changed daily   Sacral wound: Cleanse with wound cleanser, apply xeroform, ABD pad, and tape to be changed daily     Patient Orders:  Hydrocodone 10 mg, number 42, prescribed.

## 2023-05-01 NOTE — TELEPHONE ENCOUNTER
Received a call from Malathi in regards to patient's CBC lab. Patient was ordered for a CBC to be drawn today and he refused. He stated that he is a hard stick and anytime labs are to be drawn, a line must be placed.     If necessary, please advise. Thanks!

## 2023-05-02 NOTE — TELEPHONE ENCOUNTER
Called and spoke to Adilia at [facility]. Malathi reported that they do patient's labs there and then send them off to [facility].

## 2023-05-02 NOTE — TELEPHONE ENCOUNTER
Called Malathi and told her that his PCP recommended that he go to [facility] to have his CBC drawn. She voiced her understanding and said she will let the patient know.

## 2023-05-08 NOTE — TELEPHONE ENCOUNTER
----- Message from Quita Boogie MD sent at 5/4/2023  5:00 PM CDT -----  Patient has bacterial urinary tract infection, susceptible to Cipro, Rx Cipro sent, take until course is completed.

## 2023-05-08 NOTE — TELEPHONE ENCOUNTER
Called pt, voiced understanding. Thanks     Epidermal Closure Graft Donor Site (Optional): simple interrupted

## 2023-05-15 PROBLEM — Z09 HOSPITAL DISCHARGE FOLLOW-UP: Status: RESOLVED | Noted: 2023-02-07 | Resolved: 2023-01-01

## 2023-05-26 NOTE — PROGRESS NOTES
"   Subjective:       Patient ID: Modesto Payton is a 48 y.o. male.    Chief Complaint: Pressure Ulcer (Right posterior elbow - stage 4/Back - stage 4/Right lower leg - stage 4/Right posterior thigh - stage 4/Sacrum - stage 4//NEW WOUNDS:/Right forearm/Left lower leg/Right upper posterior arm /Left elbow /Left axilla/Left posterior knee )    48-year-old black male, with quadriplegia, who is here for follow up of his diffuse pressure injuries on his back.  He had an MRI done of his abdomen recently, and sustained some injuries to both elbow, when he was removed out of the MRI machine.  It was accidental.  He has been hurting, and would like to go back to hydrocodone 3 times a day.  Patient arrives today with multiple new injuries to bilateral arms. Reportedly was placed into the MRI machine and arms were "jammed" into the machine and was rubbing against the sides for 10 minutes before someone attempted to assist him.     Review of Systems   Constitutional: Negative.    Respiratory: Negative.     Cardiovascular: Negative.    Skin:         As documented in the HPI.   All other systems reviewed and are negative.      Objective:      Vitals:    05/26/23 1052   BP: 125/74   BP Location: Right arm   Patient Position: Lying   Pulse: 71   Resp: 18   Weight: 74.8 kg (165 lb)   Height: 6' 2" (1.88 m)        Physical Exam  Vitals and nursing note reviewed. Exam conducted with a chaperone present.   Constitutional:       Appearance: Normal appearance.   Cardiovascular:      Rate and Rhythm: Normal rate.   Pulmonary:      Effort: Pulmonary effort is normal.   Skin:     General: Skin is warm and dry.      Comments: There are new open ulcers to both elbow.  There is an open ulcer in the left axilla.  The remainder of his wounds appears slightly improved from his last visit 1 month ago.  I treated all of these with silver nitrate, for hypergranulation.  There was no surgical debridement.   Neurological:      Mental Status: He is " alert.      Comments: Complete quadriplegia, but he remains alert and oriented.     Right posterior thigh    Right lower leg    Sacrum    Back    Right upper posterior arm    Right elbow    Right forearm    Left posterior knee    Left axilla    Left elbow    Left lower leg         Assessment:         ICD-10-CM ICD-9-CM   1. Pressure injury of right leg, stage 4  L89.894 707.09     707.24   2. Pressure injury of sacral region, stage 4  L89.154 707.03     707.24   3. Open wound of left elbow, initial encounter  S51.002A 881.01   4. Open wound of right elbow, initial encounter  S51.001A 881.01   5. Pressure injury of right upper back, stage 4  L89.114 707.02     707.24         Procedures:   Excisional debridement performed:  [] Yes [x] No   Selective debridement performed:  [] Yes [x] No   Mechanical debridement performed:  [] Yes [x] No   Silver nitrate applied :    [x] Yes [] No   Labs ordered this visit:   [] Yes [x] No   Imaging ordered this visit:   [] Yes [x] No   Tissue pathology and/or culture taken:  [] Yes [x] No     Procedures:  HOME HEALTH AGENCY:  [facility]     Since 2/6/2023     [phone]     TIMES PER WEEK/DAYS:  ORDERS:  Right posterior elbow wound: Cleanse with wound cleanser, apply xeroform, and gentle foam border dressing to be changed daily   Back wound: Cleanse with wound cleanser, apply xeroform, ABD pad, and tape to be changed daily   Right lower leg wound: Cleanse with wound cleanser, apply xeroform, and gentle foam border dressing to be changed daily   Right posterior thigh wound: Cleanse with wound cleanser, apply xeroform, ABD pad, and tape to be changed daily   Sacral wound: Cleanse with wound cleanser, apply xeroform, ABD pad, and tape to be changed daily   Right lower arm wound: Cleanse with wound cleanser, apply xeroform, and gentle foam border dressing to be changed daily   Right upper posterior arm wound: Cleanse with wound  cleanser, apply xeroform, and gentle foam border dressing to be changed daily   Left elbow wound: Cleanse with wound cleanser, apply xeroform, and gentle foam border dressing to be changed daily   Left axilla wound: Cleanse with wound cleanser, apply xeroform, and gentle foam border dressing to be changed daily   Left lower leg wound: Cleanse with wound cleanser, apply xeroform, ABD pad, and tape to be changed daily   Left posterior knee wound: Cleanse with wound cleanser, apply xeroform, and gentle foam border dressing to be changed daily     Patient Orders:  Hydrocodone 10 mg, number 63    Returned to the clinic in 3 weeks.

## 2023-06-02 NOTE — TELEPHONE ENCOUNTER
----- Message from Fanta Solitario MA sent at 5/31/2023  4:20 PM CDT -----  Regarding: FW: no show    ----- Message -----  From: Mila Stewart  Sent: 5/31/2023   4:10 PM CDT  To: [facility] Staff  Subject: no show                                          .Type:  Needs Medical Advice    Who Called: patient  Symptoms (please be specific):    How long has patient had these symptoms:    Pharmacy name and phone #:    Would the patient rather a call back or a response via [facility]? Call back  Best Call Back Number: [phone]  Additional Information: patient stated that he was online for appointment but no one came on the screen. He is rescheduled for 6/6 virtual but would like to be contacted in regards to how to get on the correct way. Please advise.
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
## 2023-06-16 NOTE — PROGRESS NOTES
Subjective:       Patient ID: Modesto Payton is a 48 y.o. male.    Chief Complaint: Pressure Ulcer ( Pressure Ulcer (Right posterior elbow - stage 4/Back - stage 4/Right lower leg - stage 4/Right posterior thigh - stage 4/Sacrum - stage 4//NEW WOUNDS:/Right forearm/Left lower leg/Right upper posterior arm /Left elbow /Left axilla/Left posterior knee ))    48-year-old black male, a quadriplegia, who is here for follow up of his multiple pressure injuries.  The wounds are stable, looking slightly better.  He is waiting for his new air loss mattress to come into his house.  There are no new issues.    Review of Systems   Constitutional: Negative.    Respiratory: Negative.     Cardiovascular: Negative.    Skin:         As documented in the HPI.   All other systems reviewed and are negative.      Objective:    There were no vitals filed for this visit.     Physical Exam  Vitals and nursing note reviewed. Exam conducted with a chaperone present.   Constitutional:       Appearance: Normal appearance.   Cardiovascular:      Rate and Rhythm: Normal rate.   Pulmonary:      Effort: Pulmonary effort is normal.   Skin:     General: Skin is warm and dry.      Comments: There are multiple pressure ulcers on his back.  None of them appear to be deteriorated.  Some of these wounds are actually improving.  I treated all of these with chemical cauterization, silver nitrate.  There was no surgical debridements done.   Neurological:      Mental Status: He is alert.     Right posterior thigh    Sacrum    Back    Right lower leg    Right posterior Upper Arm    Right posterior elbow         Assessment:         ICD-10-CM ICD-9-CM   1. Pressure injury of right leg, stage 4  L89.894 707.09     707.24   2. Pressure injury of sacral region, stage 4  L89.154 707.03     707.24   3. Open wound of right elbow, subsequent encounter  S51.001D V58.89     881.01   4. Pressure injury of right upper back, stage 4  L89.114 707.02     707.24   5.  Pressure injury of left leg, stage 4  L89.894 707.09     707.24         Procedures:   Excisional debridement performed:  [] Yes [x] No   Selective debridement performed:  [] Yes [x] No   Mechanical debridement performed:  [] Yes [x] No   Silver nitrate applied :    [x] Yes [] No   Labs ordered this visit:   [] Yes [x] No   Imaging ordered this visit:   [] Yes [x] No   Tissue pathology and/or culture taken:  [] Yes [x] No     Procedures:  [facility]:  [facility]     Since 2/6/2023     [phone]     TIMES PER WEEK/DAYS:  ORDERS:  Right posterior elbow wound: Cleanse with wound cleanser, apply xeroform, and gentle foam border dressing to be changed daily   Back wound: Cleanse with wound cleanser, apply xeroform, ABD pad, and tape to be changed daily   Right lower leg wound: Cleanse with wound cleanser, apply xeroform, and gentle foam border dressing to be changed daily   Right posterior thigh wound: Cleanse with wound cleanser, apply xeroform, ABD pad, and tape to be changed daily   Sacral wound: Cleanse with wound cleanser, apply xeroform, ABD pad, and tape to be changed daily   Right lower arm wound: Cleanse with wound cleanser, apply xeroform, and gentle foam border dressing to be changed daily   Right upper posterior arm wound: Cleanse with wound cleanser, apply xeroform, and gentle foam border dressing to be changed daily   Left elbow wound: Cleanse with wound cleanser, apply xeroform, and gentle foam border dressing to be changed daily   Left axilla wound: Cleanse with wound cleanser, apply xeroform, and gentle foam border dressing to be changed daily   Left lower leg wound: Cleanse with wound cleanser, apply xeroform, ABD pad, and tape to be changed daily   Left posterior knee wound: Cleanse with wound cleanser, apply xeroform, and gentle foam border dressing to be change    Patient Orders:    Hydrocodone 10 mg, number 60, prescribed today.

## 2023-06-19 PROBLEM — N39.0 UTI (URINARY TRACT INFECTION): Status: RESOLVED | Noted: 2023-01-01 | Resolved: 2023-01-01

## 2023-07-07 NOTE — PROGRESS NOTES
"   Subjective:       Patient ID: Modesto Payton is a 48 y.o. male.    Chief Complaint: Pressure Ulcer (Right posterior elbow/Back /Right lower leg /Right posterior thigh /Sacrum/Right lower arm (HEALED)/Right upper posterior arm/Left elbow /Left axilla/Left lower leg/Left posterior knee/Left hip (NEW))    48-year-old black male, who has quadriplegia, is here for follow up of his multiple pressure ulcers on his back side.  He still did not receive his new low air loss mattress.  However, the wounds are stable.  He has no new issues.  He would like his hydrocodone refilled.    Review of Systems   Constitutional: Negative.    Respiratory: Negative.     Cardiovascular: Negative.    Skin:         As documented in the HPI.   All other systems reviewed and are negative.      Objective:        Vitals:    07/07/23 1209   BP: 101/72   BP Location: Right arm   Patient Position: Lying   Pulse: 78   Resp: 17   Weight: 74.8 kg (165 lb)   Height: 6' 2" (1.88 m)        Physical Exam  Vitals and nursing note reviewed. Exam conducted with a chaperone present.   Constitutional:       Appearance: Normal appearance.   Cardiovascular:      Rate and Rhythm: Normal rate.   Pulmonary:      Effort: Pulmonary effort is normal.   Skin:     General: Skin is warm and dry.      Comments: There multiple stage 2 and 3 pressure injuries to his back side.  I used silver nitrate to cauterize the wound beds.  There appears to be no wounds with secondary infection.   Neurological:      Mental Status: He is alert.     Right posterior elbow      Back      Right lower leg      Right posterior thigh       Sacrum      Right lower arm      Left elbow       Left axilla      Left lower leg       Left posterior knee       Left hip                            Altered Skin Integrity 05/13/22 1237 Back #2 Other (comment) Full thickness tissue loss with exposed bone, tendon, or muscle. Often includes undermining and tunneling. May extend into muscle and/or " supporting structures. (Active)   05/13/22 1237   Altered Skin Integrity Present on Admission - Did Patient arrive to the hospital with altered skin?: yes   Side:    Orientation:    Location: Back   Wound Number: #2   Is this injury device related?: No   Primary Wound Type: Other   Description of Altered Skin Integrity: Full thickness tissue loss with exposed bone, tendon, or muscle. Often includes undermining and tunneling. May extend into muscle and/or supporting structures.   Ankle-Brachial Index:    Pulses:    Removal Indication and Assessment:    Wound Outcome:    (Retired) Wound Length (cm):    (Retired) Wound Width (cm):    (Retired) Depth (cm):    Wound Description (Comments):    Removal Indications:    Dressing Appearance Moist drainage 07/07/23 1210   Drainage Amount Moderate 07/07/23 1210   Drainage Characteristics/Odor Serosanguineous 07/07/23 1210   Appearance Moist;Red;Granulating;Slough 07/07/23 1210   Tissue loss description Full thickness 07/07/23 1210   Periwound Area Intact 07/07/23 1210   Wound Edges Open 07/07/23 1210   Wound Length (cm) 8 cm 07/07/23 1210   Wound Width (cm) 16 cm 07/07/23 1210   Wound Depth (cm) 0.2 cm 07/07/23 1210   Wound Volume (cm^3) 25.6 cm^3 07/07/23 1210   Wound Surface Area (cm^2) 128 cm^2 07/07/23 1210   Care Cleansed with:;Wound cleanser 07/07/23 1210   Dressing Applied;Other (comment) 07/07/23 1210   Dressing Change Due 07/08/23 07/07/23 1210            Altered Skin Integrity 05/13/22 1247 Right posterior Thigh #4 Other (comment) Full thickness tissue loss. Subcutaneous fat may be visible but bone, tendon or muscle are not exposed (Active)   05/13/22 1247   Altered Skin Integrity Present on Admission - Did Patient arrive to the hospital with altered skin?: yes   Side: Right   Orientation: posterior   Location: Thigh   Wound Number: #4   Is this injury device related?: No   Primary Wound Type: Other   Description of Altered Skin Integrity: Full thickness tissue loss.  Subcutaneous fat may be visible but bone, tendon or muscle are not exposed   Ankle-Brachial Index:    Pulses:    Removal Indication and Assessment:    Wound Outcome:    (Retired) Wound Length (cm):    (Retired) Wound Width (cm):    (Retired) Depth (cm):    Wound Description (Comments):    Removal Indications:    Dressing Appearance Moist drainage 07/07/23 1210   Drainage Amount Moderate 07/07/23 1210   Drainage Characteristics/Odor Serosanguineous 07/07/23 1210   Appearance Moist;Red;Slough;Hypergranulation 07/07/23 1210   Tissue loss description Full thickness 07/07/23 1210   Periwound Area Intact;Moist 07/07/23 1210   Wound Edges Open 07/07/23 1210   Wound Length (cm) 27 cm 07/07/23 1210   Wound Width (cm) 15 cm 07/07/23 1210   Wound Depth (cm) 0.2 cm 07/07/23 1210   Wound Volume (cm^3) 81 cm^3 07/07/23 1210   Wound Surface Area (cm^2) 405 cm^2 07/07/23 1210   Care Cleansed with:;Wound cleanser 07/07/23 1210   Dressing Applied;Other (comment) 07/07/23 1210   Dressing Change Due 07/08/23 07/07/23 1210            Altered Skin Integrity 05/13/22 0107 Sacral spine #5 Other (comment) Full thickness tissue loss with exposed bone, tendon, or muscle. Often includes undermining and tunneling. May extend into muscle and/or supporting structures. (Active)   05/13/22 0107   Altered Skin Integrity Present on Admission - Did Patient arrive to the hospital with altered skin?: yes   Side:    Orientation:    Location: Sacral spine   Wound Number: #5   Is this injury device related?: No   Primary Wound Type: Other   Description of Altered Skin Integrity: Full thickness tissue loss with exposed bone, tendon, or muscle. Often includes undermining and tunneling. May extend into muscle and/or supporting structures.   Ankle-Brachial Index:    Pulses:    Removal Indication and Assessment:    Wound Outcome:    (Retired) Wound Length (cm):    (Retired) Wound Width (cm):    (Retired) Depth (cm):    Wound Description (Comments):    Removal  Indications:    Dressing Appearance Moist drainage 07/07/23 1210   Drainage Amount Moderate 07/07/23 1210   Drainage Characteristics/Odor Serosanguineous 07/07/23 1210   Appearance Moist;Red;Slough;Hypergranulation 07/07/23 1210   Tissue loss description Full thickness 07/07/23 1210   Periwound Area Intact;Moist 07/07/23 1210   Wound Edges Open 07/07/23 1210   Wound Length (cm) 9 cm 07/07/23 1210   Wound Width (cm) 7.5 cm 07/07/23 1210   Wound Depth (cm) 0.2 cm 07/07/23 1210   Wound Volume (cm^3) 13.5 cm^3 07/07/23 1210   Wound Surface Area (cm^2) 67.5 cm^2 07/07/23 1210   Care Cleansed with:;Wound cleanser 07/07/23 1210   Dressing Applied;Other (comment) 07/07/23 1210   Dressing Change Due 07/08/23 07/07/23 1210            Altered Skin Integrity 03/18/23 0545 Right posterior Elbow #1 (Active)   03/18/23 0545   Altered Skin Integrity Present on Admission - Did Patient arrive to the hospital with altered skin?: yes   Side: Right   Orientation: posterior   Location: Elbow   Wound Number: #1   Is this injury device related?: No   Primary Wound Type:    Description of Altered Skin Integrity:    Ankle-Brachial Index:    Pulses:    Removal Indication and Assessment:    Wound Outcome:    (Retired) Wound Length (cm):    (Retired) Wound Width (cm):    (Retired) Depth (cm):    Wound Description (Comments):    Removal Indications:    Dressing Appearance Moist drainage 07/07/23 1210   Drainage Amount Moderate 07/07/23 1210   Drainage Characteristics/Odor Serosanguineous 07/07/23 1210   Appearance Moist;Red;Granulating;Slough 07/07/23 1210   Tissue loss description Full thickness 07/07/23 1210   Periwound Area Moist;Intact 07/07/23 1210   Wound Edges Open 07/07/23 1210   Wound Length (cm) 5.5 cm 07/07/23 1210   Wound Width (cm) 1 cm 07/07/23 1210   Wound Depth (cm) 0.2 cm 07/07/23 1210   Wound Volume (cm^3) 1.1 cm^3 07/07/23 1210   Wound Surface Area (cm^2) 5.5 cm^2 07/07/23 1210   Care Cleansed with:;Wound cleanser 07/07/23  1210   Dressing Applied;Other (comment) 07/07/23 1210   Dressing Change Due 07/08/23 07/07/23 1210            Altered Skin Integrity 03/31/23 1156 Right lower Leg #3 (Active)   03/31/23 1156   Altered Skin Integrity Present on Admission - Did Patient arrive to the hospital with altered skin?: yes   Side: Right   Orientation: lower   Location: Leg   Wound Number: #3   Is this injury device related?: No   Primary Wound Type:    Description of Altered Skin Integrity:    Ankle-Brachial Index:    Pulses:    Removal Indication and Assessment:    Wound Outcome:    (Retired) Wound Length (cm):    (Retired) Wound Width (cm):    (Retired) Depth (cm):    Wound Description (Comments):    Removal Indications:    Dressing Appearance Moist drainage 07/07/23 1210   Drainage Amount Moderate 07/07/23 1210   Drainage Characteristics/Odor Serosanguineous 07/07/23 1210   Appearance Moist;Red;Granulating 07/07/23 1210   Tissue loss description Full thickness 07/07/23 1210   Periwound Area Intact;Moist 07/07/23 1210   Wound Edges Open 07/07/23 1210   Wound Length (cm) 3.7 cm 07/07/23 1210   Wound Width (cm) 1.8 cm 07/07/23 1210   Wound Depth (cm) 0.2 cm 07/07/23 1210   Wound Volume (cm^3) 1.332 cm^3 07/07/23 1210   Wound Surface Area (cm^2) 6.66 cm^2 07/07/23 1210   Care Cleansed with:;Wound cleanser 07/07/23 1210   Dressing Applied;Other (comment) 07/07/23 1210   Dressing Change Due 07/08/23 07/07/23 1210            Altered Skin Integrity 05/26/23 1146 Right upper;posterior Arm #7 (Active)   05/26/23 1146   Altered Skin Integrity Present on Admission - Did Patient arrive to the hospital with altered skin?: yes   Side: Right   Orientation: upper;posterior   Location: Arm   Wound Number: #7   Is this injury device related?: No   Primary Wound Type:    Description of Altered Skin Integrity:    Ankle-Brachial Index:    Pulses:    Removal Indication and Assessment:    Wound Outcome:    (Retired) Wound Length (cm):    (Retired) Wound Width  (cm):    (Retired) Depth (cm):    Wound Description (Comments):    Removal Indications:    Dressing Appearance Moist drainage 07/07/23 1210   Drainage Characteristics/Odor Serosanguineous 07/07/23 1210   Appearance Moist;Red;Hypergranulation;Slough 07/07/23 1210   Tissue loss description Full thickness 07/07/23 1210   Periwound Area Moist;Intact 07/07/23 1210   Wound Edges Open 07/07/23 1210   Wound Length (cm) 6 cm 07/07/23 1210   Wound Width (cm) 1.5 cm 07/07/23 1210   Wound Depth (cm) 0.2 cm 07/07/23 1210   Wound Volume (cm^3) 1.8 cm^3 07/07/23 1210   Wound Surface Area (cm^2) 9 cm^2 07/07/23 1210   Care Cleansed with:;Wound cleanser 07/07/23 1210   Dressing Applied;Other (comment) 07/07/23 1210   Dressing Change Due 07/08/23 07/07/23 1210            Altered Skin Integrity 05/26/23 1147 Left Elbow #8 (Active)   05/26/23 1147   Altered Skin Integrity Present on Admission - Did Patient arrive to the hospital with altered skin?: yes   Side: Left   Orientation:    Location: Elbow   Wound Number: #8   Is this injury device related?: No   Primary Wound Type:    Description of Altered Skin Integrity:    Ankle-Brachial Index:    Pulses:    Removal Indication and Assessment:    Wound Outcome:    (Retired) Wound Length (cm):    (Retired) Wound Width (cm):    (Retired) Depth (cm):    Wound Description (Comments):    Removal Indications:    Dressing Appearance Moist drainage 07/07/23 1210   Drainage Amount Moderate 07/07/23 1210   Drainage Characteristics/Odor Serosanguineous 07/07/23 1210   Appearance Moist;Slough;Hypergranulation;Red 07/07/23 1210   Tissue loss description Full thickness 07/07/23 1210   Periwound Area Intact;Moist 07/07/23 1210   Wound Edges Open 07/07/23 1210   Wound Length (cm) 5.2 cm 07/07/23 1210   Wound Width (cm) 3.7 cm 07/07/23 1210   Wound Depth (cm) 0.3 cm 07/07/23 1210   Wound Volume (cm^3) 5.772 cm^3 07/07/23 1210   Wound Surface Area (cm^2) 19.24 cm^2 07/07/23 1210   Care Cleansed with:;Wound  cleanser 07/07/23 1210   Dressing Applied;Other (comment) 07/07/23 1210   Dressing Change Due 07/08/23 07/07/23 1210            Altered Skin Integrity 05/26/23 1147 Left Axilla #9 (Active)   05/26/23 1147   Altered Skin Integrity Present on Admission - Did Patient arrive to the hospital with altered skin?: yes   Side: Left   Orientation:    Location: Axilla   Wound Number: #9   Is this injury device related?: No   Primary Wound Type:    Description of Altered Skin Integrity:    Ankle-Brachial Index:    Pulses:    Removal Indication and Assessment:    Wound Outcome:    (Retired) Wound Length (cm):    (Retired) Wound Width (cm):    (Retired) Depth (cm):    Wound Description (Comments):    Removal Indications:    Dressing Appearance Moist drainage 07/07/23 1210   Drainage Amount Moderate 07/07/23 1210   Drainage Characteristics/Odor Serosanguineous 07/07/23 1210   Appearance Moist;Red;Granulating;Slough 07/07/23 1210   Tissue loss description Full thickness 07/07/23 1210   Periwound Area Intact;Moist 07/07/23 1210   Wound Edges Open 07/07/23 1210   Wound Length (cm) 0.9 cm 07/07/23 1210   Wound Width (cm) 2.6 cm 07/07/23 1210   Wound Depth (cm) 0.2 cm 07/07/23 1210   Wound Volume (cm^3) 0.468 cm^3 07/07/23 1210   Wound Surface Area (cm^2) 2.34 cm^2 07/07/23 1210   Care Cleansed with:;Wound cleanser 07/07/23 1210   Dressing Applied;Other (comment) 07/07/23 1210   Dressing Change Due 07/08/23 07/07/23 1210            Altered Skin Integrity 05/26/23 1147 Left lower Leg #10 (Active)   05/26/23 1147   Altered Skin Integrity Present on Admission - Did Patient arrive to the hospital with altered skin?: yes   Side: Left   Orientation: lower   Location: Leg   Wound Number: #10   Is this injury device related?: No   Primary Wound Type:    Description of Altered Skin Integrity:    Ankle-Brachial Index:    Pulses:    Removal Indication and Assessment:    Wound Outcome:    (Retired) Wound Length (cm):    (Retired) Wound Width (cm):     (Retired) Depth (cm):    Wound Description (Comments):    Removal Indications:    Dressing Appearance Moist drainage 07/07/23 1210   Drainage Amount Moderate 07/07/23 1210   Drainage Characteristics/Odor Serosanguineous 07/07/23 1210   Appearance Moist;Slough;Granulating;Red 07/07/23 1210   Tissue loss description Full thickness 07/07/23 1210   Periwound Area Intact;Moist 07/07/23 1210   Wound Edges Open 07/07/23 1210   Wound Length (cm) 1.3 cm 07/07/23 1210   Wound Width (cm) 0.8 cm 07/07/23 1210   Wound Depth (cm) 0.2 cm 07/07/23 1210   Wound Volume (cm^3) 0.208 cm^3 07/07/23 1210   Wound Surface Area (cm^2) 1.04 cm^2 07/07/23 1210   Care Cleansed with:;Wound cleanser 07/07/23 1210   Dressing Applied;Other (comment) 07/07/23 1210   Dressing Change Due 07/08/23 07/07/23 1210            Altered Skin Integrity 05/26/23 1148 Left posterior Knee #11 (Active)   05/26/23 1148   Altered Skin Integrity Present on Admission - Did Patient arrive to the hospital with altered skin?: yes   Side: Left   Orientation: posterior   Location: Knee   Wound Number: #11   Is this injury device related?: No   Primary Wound Type:    Description of Altered Skin Integrity:    Ankle-Brachial Index:    Pulses:    Removal Indication and Assessment:    Wound Outcome:    (Retired) Wound Length (cm):    (Retired) Wound Width (cm):    (Retired) Depth (cm):    Wound Description (Comments):    Removal Indications:    Dressing Appearance Moist drainage 07/07/23 1210   Drainage Amount Moderate 07/07/23 1210   Drainage Characteristics/Odor Serosanguineous 07/07/23 1210   Appearance Moist;Red;Granulating;Slough 07/07/23 1210   Tissue loss description Full thickness 07/07/23 1210   Periwound Area Intact;Moist 07/07/23 1210   Wound Edges Open 07/07/23 1210   Wound Length (cm) 2.8 cm 07/07/23 1210   Wound Width (cm) 2.5 cm 07/07/23 1210   Wound Depth (cm) 0.2 cm 07/07/23 1210   Wound Volume (cm^3) 1.4 cm^3 07/07/23 1210   Wound Surface Area (cm^2) 7  cm^2 07/07/23 1210   Care Cleansed with:;Wound cleanser 07/07/23 1210   Dressing Applied;Other (comment) 07/07/23 1210   Dressing Change Due 07/08/23 07/07/23 1210         Assessment:         ICD-10-CM ICD-9-CM   1. Pressure injury of right leg, stage 4  L89.894 707.09     707.24   2. Pressure injury of sacral region, stage 4  L89.154 707.03     707.24   3. Open wound of right elbow, subsequent encounter  S51.001D V58.89     881.01   4. Pressure injury of right upper back, stage 4  L89.114 707.02     707.24         Procedures:   Excisional debridement performed:  [] Yes [x] No   Selective debridement performed:  [] Yes [x] No   Mechanical debridement performed:  [] Yes [x] No   Silver nitrate applied :    [x] Yes [] No   Labs ordered this visit:   [] Yes [x] No   Imaging ordered this visit:   [] Yes [x] No   Tissue pathology and/or culture taken:  [] Yes [x] No     Procedures:  [facility]  [facility]     Since 2/6/2023     [phone]     TIMES PER WEEK/DAYS:  ORDERS:  Right posterior elbow wound: Cleanse with wound cleanser, apply xeroform, and gentle foam border dressing to be changed daily   Back wound: Cleanse with wound cleanser, apply xeroform, ABD pad, and tape to be changed daily   Right lower leg wound: Cleanse with wound cleanser, apply xeroform, and gentle foam border dressing to be changed daily   Right posterior thigh wound: Cleanse with wound cleanser, apply xeroform, ABD pad, and tape to be changed daily   Sacral wound: Cleanse with wound cleanser, apply xeroform, ABD pad, and tape to be changed daily    Right upper posterior arm wound: Cleanse with wound cleanser, apply xeroform, and gentle foam border dressing to be changed daily   Left elbow wound: Cleanse with wound cleanser, apply xeroform, and gentle foam border dressing to be changed daily   Left axilla wound: Cleanse with wound cleanser, apply xeroform, and gentle foam border  dressing to be changed daily   Left lower leg wound: Cleanse with wound cleanser, apply xeroform, ABD pad, and tape to be changed daily   Left posterior knee wound: Cleanse with wound cleanser, apply xeroform, and gentle foam border dressing to be changed daily   Left hip wound: Cleanse with wound cleanser, apply xeroform, and gentle foam border dressing to be changed daily     Patient Orders:    Hydrocodone 10 mg, number 60, prescribed today.      He will return in 3 weeks.

## 2023-07-28 PROBLEM — S51.001A OPEN WOUND OF RIGHT ELBOW: Status: ACTIVE | Noted: 2023-01-01

## 2023-07-28 NOTE — PROGRESS NOTES
"Home Health: [facility]   Smoker   [] Yes  [x] No  Diabetic   [] Yes   [x] No   Low air loss mattress [] Yes [x] No (family is trying to get,has been ordered)    Is the patient eligible for a graft, and/or currently grafting?  [] Yes [x] No      Subjective:       Patient ID: Modesto Payton is a 48 y.o. male.    Chief Complaint: Pressure Ulcer    48-year-old black male, who is a quadriplegia, is here for follow up of his multiple pressure ulcers.  These ulcers have not significantly deteriorated, are for the most part slowly improving.  He still has not received his low air loss mattress.  There are no new complaints today.  He has home health services twice a week.      Review of Systems   Constitutional: Negative.    Respiratory: Negative.     Cardiovascular: Negative.    Skin:         As documented in the HPI.   All other systems reviewed and are negative.      Objective:      Vitals:    07/28/23 1322   BP: 114/70   BP Location: Right arm   Patient Position: Sitting   Pulse: 80   Resp: 18   Weight: 74.8 kg (165 lb)   Height: 6' 2" (1.88 m)      Physical Exam  Vitals and nursing note reviewed. Exam conducted with a chaperone present.   Constitutional:       Appearance: Normal appearance.   Cardiovascular:      Rate and Rhythm: Normal rate.   Pulmonary:      Effort: Pulmonary effort is normal.   Skin:     General: Skin is warm and dry.      Comments: There are multiple pressure injuries to his elbow and back side.  All of these are mostly stage II and 3 ulcers.  We did mechanical debridement, and then I cauterized most of these wounds with silver nitrate.  There was no significant pain or bleeding.   Neurological:      Mental Status: He is alert.          Altered Skin Integrity 05/13/22 1237 Back #2 Other (comment) Full thickness tissue loss with exposed bone, tendon, or muscle. Often includes undermining and tunneling. May extend into muscle and/or supporting structures. (Active)   05/13/22 1237   Altered Skin " Integrity Present on Admission - Did Patient arrive to the hospital with altered skin?: yes   Side:    Orientation:    Location: Back   Wound Number: #2   Is this injury device related?: No   Primary Wound Type: Other   Description of Altered Skin Integrity: Full thickness tissue loss with exposed bone, tendon, or muscle. Often includes undermining and tunneling. May extend into muscle and/or supporting structures.   Ankle-Brachial Index:    Pulses:    Removal Indication and Assessment:    Wound Outcome:    (Retired) Wound Length (cm):    (Retired) Wound Width (cm):    (Retired) Depth (cm):    Wound Description (Comments):    Removal Indications:    Dressing Appearance Moist drainage 07/28/23 1326   Drainage Amount Moderate 07/28/23 1326   Drainage Characteristics/Odor Serosanguineous 07/28/23 1326   Appearance Red;Hypergranulation;Moist;Bleeding 07/28/23 1326   Tissue loss description Full thickness 07/28/23 1326   Periwound Area Intact 07/28/23 1326   Wound Edges Open 07/28/23 1326   Wound Length (cm) 37 cm 07/28/23 1326   Wound Width (cm) 23.5 cm 07/28/23 1326   Wound Depth (cm) 0.2 cm 07/28/23 1326   Wound Volume (cm^3) 173.9 cm^3 07/28/23 1326   Wound Surface Area (cm^2) 869.5 cm^2 07/28/23 1326   Care Cleansed with:;Wound cleanser 07/28/23 1326   Dressing Applied 07/28/23 1326   Dressing Change Due 07/29/23 07/28/23 1326            Altered Skin Integrity 05/13/22 1247 Right posterior Thigh #4 Other (comment) Full thickness tissue loss. Subcutaneous fat may be visible but bone, tendon or muscle are not exposed (Active)   05/13/22 1247   Altered Skin Integrity Present on Admission - Did Patient arrive to the hospital with altered skin?: yes   Side: Right   Orientation: posterior   Location: Thigh   Wound Number: #4   Is this injury device related?: No   Primary Wound Type: Other   Description of Altered Skin Integrity: Full thickness tissue loss. Subcutaneous fat may be visible but bone, tendon or muscle are not  exposed   Ankle-Brachial Index:    Pulses:    Removal Indication and Assessment:    Wound Outcome:    (Retired) Wound Length (cm):    (Retired) Wound Width (cm):    (Retired) Depth (cm):    Wound Description (Comments):    Removal Indications:    Dressing Appearance Moist drainage 07/28/23 1326   Drainage Amount Moderate 07/28/23 1326   Drainage Characteristics/Odor Serosanguineous 07/28/23 1326   Appearance Red;Hypergranulation;Moist 07/28/23 1326   Tissue loss description Full thickness 07/28/23 1326   Periwound Area Intact 07/28/23 1326   Wound Edges Open 07/28/23 1326   Wound Length (cm) 20 cm 07/28/23 1326   Wound Width (cm) 11 cm 07/28/23 1326   Wound Depth (cm) 0.2 cm 07/28/23 1326   Wound Volume (cm^3) 44 cm^3 07/28/23 1326   Wound Surface Area (cm^2) 220 cm^2 07/28/23 1326   Care Cleansed with:;Wound cleanser 07/28/23 1326   Dressing Applied 07/28/23 1326   Dressing Change Due 07/29/23 07/28/23 1326            Altered Skin Integrity 05/13/22 0107 Sacral spine #5 Other (comment) Full thickness tissue loss with exposed bone, tendon, or muscle. Often includes undermining and tunneling. May extend into muscle and/or supporting structures. (Active)   05/13/22 0107   Altered Skin Integrity Present on Admission - Did Patient arrive to the hospital with altered skin?: yes   Side:    Orientation:    Location: Sacral spine   Wound Number: #5   Is this injury device related?: No   Primary Wound Type: Other   Description of Altered Skin Integrity: Full thickness tissue loss with exposed bone, tendon, or muscle. Often includes undermining and tunneling. May extend into muscle and/or supporting structures.   Ankle-Brachial Index:    Pulses:    Removal Indication and Assessment:    Wound Outcome:    (Retired) Wound Length (cm):    (Retired) Wound Width (cm):    (Retired) Depth (cm):    Wound Description (Comments):    Removal Indications:    Dressing Appearance Moist drainage 07/28/23 1326   Drainage Amount Moderate  07/28/23 1326   Drainage Characteristics/Odor Serosanguineous 07/28/23 1326   Appearance Red;Hypergranulation;Moist 07/28/23 1326   Tissue loss description Full thickness 07/28/23 1326   Periwound Area Intact 07/28/23 1326   Wound Edges Open 07/28/23 1326   Wound Length (cm) 10.5 cm 07/28/23 1326   Wound Width (cm) 10 cm 07/28/23 1326   Wound Depth (cm) 0.2 cm 07/28/23 1326   Wound Volume (cm^3) 21 cm^3 07/28/23 1326   Wound Surface Area (cm^2) 105 cm^2 07/28/23 1326   Care Cleansed with:;Wound cleanser 07/28/23 1326   Dressing Applied 07/28/23 1326   Dressing Change Due 07/29/23 07/28/23 1326            Altered Skin Integrity 03/18/23 0545 Right posterior Elbow #1 (Active)   03/18/23 0545   Altered Skin Integrity Present on Admission - Did Patient arrive to the hospital with altered skin?: yes   Side: Right   Orientation: posterior   Location: Elbow   Wound Number: #1   Is this injury device related?: No   Primary Wound Type:    Description of Altered Skin Integrity:    Ankle-Brachial Index:    Pulses:    Removal Indication and Assessment:    Wound Outcome:    (Retired) Wound Length (cm):    (Retired) Wound Width (cm):    (Retired) Depth (cm):    Wound Description (Comments):    Removal Indications:    Dressing Appearance Moist drainage 07/28/23 1326   Drainage Amount Moderate 07/28/23 1326   Drainage Characteristics/Odor Serosanguineous 07/28/23 1326   Appearance Red;Hypergranulation;Moist 07/28/23 1326   Tissue loss description Full thickness 07/28/23 1326   Periwound Area Intact 07/28/23 1326   Wound Edges Open 07/28/23 1326   Wound Length (cm) 1 cm 07/28/23 1326   Wound Width (cm) 0.5 cm 07/28/23 1326   Wound Depth (cm) 0.2 cm 07/28/23 1326   Wound Volume (cm^3) 0.1 cm^3 07/28/23 1326   Wound Surface Area (cm^2) 0.5 cm^2 07/28/23 1326   Care Cleansed with:;Wound cleanser 07/28/23 1326   Dressing Applied 07/28/23 1326   Dressing Change Due 07/29/23 07/28/23 1326            Altered Skin Integrity 03/31/23 1153  Right lower Leg #3 (Active)   03/31/23 1156   Altered Skin Integrity Present on Admission - Did Patient arrive to the hospital with altered skin?: yes   Side: Right   Orientation: lower   Location: Leg   Wound Number: #3   Is this injury device related?: No   Primary Wound Type:    Description of Altered Skin Integrity:    Ankle-Brachial Index:    Pulses:    Removal Indication and Assessment:    Wound Outcome:    (Retired) Wound Length (cm):    (Retired) Wound Width (cm):    (Retired) Depth (cm):    Wound Description (Comments):    Removal Indications:    Dressing Appearance Moist drainage 07/28/23 1326   Drainage Amount Moderate 07/28/23 1326   Drainage Characteristics/Odor Serosanguineous 07/28/23 1326   Appearance Hypergranulation;Moist;Red 07/28/23 1326   Tissue loss description Full thickness 07/28/23 1326   Periwound Area Intact 07/28/23 1326   Wound Edges Open 07/28/23 1326   Wound Length (cm) 27 cm 07/28/23 1326   Wound Width (cm) 6 cm 07/28/23 1326   Wound Depth (cm) 0.2 cm 07/28/23 1326   Wound Volume (cm^3) 32.4 cm^3 07/28/23 1326   Wound Surface Area (cm^2) 162 cm^2 07/28/23 1326   Care Cleansed with:;Wound cleanser 07/28/23 1326   Dressing Applied 07/28/23 1326   Dressing Change Due 07/29/23 07/28/23 1326            Altered Skin Integrity 05/26/23 1146 Right upper;posterior Arm #7 (Active)   05/26/23 1146   Altered Skin Integrity Present on Admission - Did Patient arrive to the hospital with altered skin?: yes   Side: Right   Orientation: upper;posterior   Location: Arm   Wound Number: #7   Is this injury device related?: No   Primary Wound Type:    Description of Altered Skin Integrity:    Ankle-Brachial Index:    Pulses:    Removal Indication and Assessment:    Wound Outcome:    (Retired) Wound Length (cm):    (Retired) Wound Width (cm):    (Retired) Depth (cm):    Wound Description (Comments):    Removal Indications:    Dressing Appearance Moist drainage 07/28/23 1326   Drainage Amount Moderate  07/28/23 1326   Drainage Characteristics/Odor Serosanguineous 07/28/23 1326   Appearance Hypergranulation;Moist;Red 07/28/23 1326   Tissue loss description Full thickness 07/28/23 1326   Wound Edges Open 07/28/23 1326   Wound Length (cm) 8 cm 07/28/23 1326   Wound Width (cm) 4 cm 07/28/23 1326   Wound Depth (cm) 0.2 cm 07/28/23 1326   Wound Volume (cm^3) 6.4 cm^3 07/28/23 1326   Wound Surface Area (cm^2) 32 cm^2 07/28/23 1326   Care Cleansed with:;Wound cleanser 07/28/23 1326   Dressing Applied 07/28/23 1326   Dressing Change Due 07/29/23 07/28/23 1326            Altered Skin Integrity 05/26/23 1147 Left Elbow #8 (Active)   05/26/23 1147   Altered Skin Integrity Present on Admission - Did Patient arrive to the hospital with altered skin?: yes   Side: Left   Orientation:    Location: Elbow   Wound Number: #8   Is this injury device related?: No   Primary Wound Type:    Description of Altered Skin Integrity:    Ankle-Brachial Index:    Pulses:    Removal Indication and Assessment:    Wound Outcome:    (Retired) Wound Length (cm):    (Retired) Wound Width (cm):    (Retired) Depth (cm):    Wound Description (Comments):    Removal Indications:    Dressing Appearance Moist drainage 07/28/23 1326   Drainage Amount Moderate 07/28/23 1326   Drainage Characteristics/Odor Serosanguineous 07/28/23 1326   Appearance Pink;Slough;Moist 07/28/23 1326   Tissue loss description Full thickness 07/28/23 1326   Periwound Area Intact 07/28/23 1326   Wound Edges Open 07/28/23 1326   Wound Length (cm) 5 cm 07/28/23 1326   Wound Width (cm) 5.3 cm 07/28/23 1326   Wound Depth (cm) 0.3 cm 07/28/23 1326   Wound Volume (cm^3) 7.95 cm^3 07/28/23 1326   Wound Surface Area (cm^2) 26.5 cm^2 07/28/23 1326   Care Cleansed with:;Wound cleanser 07/28/23 1326   Dressing Applied 07/28/23 1326   Dressing Change Due 07/29/23 07/28/23 1326            Altered Skin Integrity 05/26/23 1147 Left Axilla #9 (Active)   05/26/23 1147   Altered Skin Integrity Present  on Admission - Did Patient arrive to the hospital with altered skin?: yes   Side: Left   Orientation:    Location: Axilla   Wound Number: #9   Is this injury device related?: No   Primary Wound Type:    Description of Altered Skin Integrity:    Ankle-Brachial Index:    Pulses:    Removal Indication and Assessment:    Wound Outcome:    (Retired) Wound Length (cm):    (Retired) Wound Width (cm):    (Retired) Depth (cm):    Wound Description (Comments):    Removal Indications:    Dressing Appearance Moist drainage 07/28/23 1326   Drainage Amount Moderate 07/28/23 1326   Drainage Characteristics/Odor Serosanguineous 07/28/23 1326   Appearance Red;Hypergranulation;Moist 07/28/23 1326   Tissue loss description Full thickness 07/28/23 1326   Periwound Area Intact 07/28/23 1326   Wound Edges Open 07/28/23 1326   Wound Length (cm) 1 cm 07/28/23 1326   Wound Width (cm) 1.3 cm 07/28/23 1326   Wound Depth (cm) 0.2 cm 07/28/23 1326   Wound Volume (cm^3) 0.26 cm^3 07/28/23 1326   Wound Surface Area (cm^2) 1.3 cm^2 07/28/23 1326   Care Cleansed with:;Wound cleanser 07/28/23 1326   Dressing Applied 07/28/23 1326   Dressing Change Due 07/29/23 07/28/23 1326            Altered Skin Integrity 05/26/23 1147 Left lower Leg #10 (Active)   05/26/23 1147   Altered Skin Integrity Present on Admission - Did Patient arrive to the hospital with altered skin?: yes   Side: Left   Orientation: lower   Location: Leg   Wound Number: #10   Is this injury device related?: No   Primary Wound Type:    Description of Altered Skin Integrity:    Ankle-Brachial Index:    Pulses:    Removal Indication and Assessment:    Wound Outcome:    (Retired) Wound Length (cm):    (Retired) Wound Width (cm):    (Retired) Depth (cm):    Wound Description (Comments):    Removal Indications:    Dressing Appearance Moist drainage 07/28/23 1326   Drainage Amount Moderate 07/28/23 1326   Drainage Characteristics/Odor Serosanguineous 07/28/23 1326   Appearance  Red;Hypergranulation;Moist 07/28/23 1326   Tissue loss description Full thickness 07/28/23 1326   Periwound Area Intact 07/28/23 1326   Wound Edges Open 07/28/23 1326   Wound Length (cm) 1 cm 07/28/23 1326   Wound Width (cm) 1 cm 07/28/23 1326   Wound Depth (cm) 0.2 cm 07/28/23 1326   Wound Volume (cm^3) 0.2 cm^3 07/28/23 1326   Wound Surface Area (cm^2) 1 cm^2 07/28/23 1326   Care Cleansed with:;Wound cleanser 07/28/23 1326   Dressing Applied 07/28/23 1326   Dressing Change Due 07/29/23 07/28/23 1326            Altered Skin Integrity 05/26/23 1148 Left posterior Knee #11 (Active)   05/26/23 1148   Altered Skin Integrity Present on Admission - Did Patient arrive to the hospital with altered skin?: yes   Side: Left   Orientation: posterior   Location: Knee   Wound Number: #11   Is this injury device related?: No   Primary Wound Type:    Description of Altered Skin Integrity:    Ankle-Brachial Index:    Pulses:    Removal Indication and Assessment:    Wound Outcome:    (Retired) Wound Length (cm):    (Retired) Wound Width (cm):    (Retired) Depth (cm):    Wound Description (Comments):    Removal Indications:    Dressing Appearance Moist drainage 07/28/23 1326   Drainage Amount Moderate 07/28/23 1326   Drainage Characteristics/Odor Serosanguineous 07/28/23 1326   Appearance Red;Hypergranulation;Moist 07/28/23 1326   Tissue loss description Full thickness 07/28/23 1326   Periwound Area Intact 07/28/23 1326   Wound Edges Open 07/28/23 1326   Wound Length (cm) 4 cm 07/28/23 1326   Wound Width (cm) 2 cm 07/28/23 1326   Wound Depth (cm) 0.2 cm 07/28/23 1326   Wound Volume (cm^3) 1.6 cm^3 07/28/23 1326   Wound Surface Area (cm^2) 8 cm^2 07/28/23 1326   Care Cleansed with:;Wound cleanser 07/28/23 1326   Dressing Applied 07/28/23 1326   Dressing Change Due 07/29/23 07/28/23 1326     07/28/23       Left posterior knee                                       Right elbow        Right lower leg                                             Sacrum        Right posterior thigh and back                      Back        Right elbow                                                  Left lower leg       Left axilla                                                     Left elbow         Assessment:         ICD-10-CM ICD-9-CM   1. Pressure injury of right leg, stage 4  L89.894 707.09     707.24   2. Pressure injury of sacral region, stage 4  L89.154 707.03     707.24   3. Open wound of right elbow, subsequent encounter  S51.001D V58.89     881.01   4. Pressure injury of right upper back, stage 4  L89.114 707.02     707.24   5. Pressure injury of left leg, stage 4  L89.894 707.09     707.24         Procedures:   Excisional debridement performed:  [] Yes [x] No   Selective debridement performed:  [] Yes [x] No   Mechanical debridement performed:  [] Yes [x] No   Silver nitrate applied :    [x] Yes [] No   Labs ordered this visit:   [] Yes [x] No   Imaging ordered this visit:   [] Yes [x] No   Tissue pathology and/or culture taken:  [] Yes [x] No     Procedures:  HOME HEALTH AG[facility]:  [facility]     Since 2/6/2023     [phone]     TIMES PER WEEK/DAYS:  ORDERS:  Right posterior elbow wound: Cleanse with wound cleanser, apply xeroform, and gentle foam border dressing to be changed daily   Back wound: Cleanse with wound cleanser, apply xeroform, ABD pad, and tape to be changed daily   Right lower leg wound: Cleanse with wound cleanser, apply xeroform, and gentle foam border dressing to be changed daily   Right posterior thigh wound: Cleanse with wound cleanser, apply xeroform, ABD pad, and tape to be changed daily   Sacral wound: Cleanse with wound cleanser, apply xeroform, ABD pad, and tape to be changed daily    Right upper posterior arm wound: Cleanse with wound cleanser, apply xeroform, and gentle foam border dressing to be changed daily   Left elbow wound: Cleanse with wound cleanser, apply xeroform, and  gentle foam border dressing to be changed daily   Left axilla wound: Cleanse with wound cleanser, apply xeroform, and gentle foam border dressing to be changed daily   Left lower leg wound: Cleanse with wound cleanser, apply xeroform, ABD pad, and tape to be changed daily   Left posterior knee wound: Cleanse with wound cleanser, apply xeroform, and gentle foam border dressing to be changed daily     I prescribed hydrocodone 10 mg, number 90.    He will return here in 1 month.

## 2023-07-31 NOTE — TELEPHONE ENCOUNTER
----- Message from Jack Wilson sent at 7/31/2023  2:02 PM CDT -----  .Type:  Needs Medical Advice    Who Called: [facility] nurse   Symptoms (please be specific):    How long has patient had these symptoms:    Pharmacy name and phone #:    Would the patient rather a call back or a response via [facility]?   Best Call Back Number: [phone]  Additional Information: She requested a call back form the nurse re: this patient.

## 2023-07-31 NOTE — TELEPHONE ENCOUNTER
Sowmya from [facility] is requesting an Order for Monthly Catheter changes for the patient. Please advise, thanks!

## 2023-08-16 NOTE — TELEPHONE ENCOUNTER
----- Message from Jack Wilson sent at 8/16/2023  1:44 PM CDT -----  .Type:  Needs Medical Advice    Who Called: Sowmya with [facility]   Symptoms (please be specific):    How long has patient had these symptoms:    Pharmacy name and phone #:    Would the patient rather a call back or a response via [facility]?   Best Call Back Number: [phone]  Additional Information: Requested a call back from the nurse re: this patient. She needs an order for  a UA.

## 2023-08-16 NOTE — TELEPHONE ENCOUNTER
----- Message from Arnel Mallory sent at 8/16/2023  2:13 PM CDT -----  .Type:  Needs Medical Advice    Who Called: Leonor Payton  Symptoms (please be specific):    How long has patient had these symptoms:    Pharmacy name and phone #:    Would the patient rather a call back or a response via [facility]? Call back  Best Call Back Number: [phone]  Additional Information: stated that Nurse Deng is needing a call before 5 due to urine sample

## 2023-08-17 NOTE — TELEPHONE ENCOUNTER
----- Message from Rosemary Booth sent at 8/17/2023  3:36 PM CDT -----  Regarding: call back  .Type:  Patient Returning Call    Who Called:Leonor his wife  Who Left Message for Patient:Leonor  Does the patient know what this is regarding?:Phone hung up  Would the patient rather a call back or a response via [facility]?   Best Call Back Number:[phone]  Additional Information: Please call Leonor rodas

## 2023-08-17 NOTE — TELEPHONE ENCOUNTER
----- Message from Alycia Live sent at 8/17/2023 10:43 AM CDT -----  .Type:  Test Results    Who Called: pt   Name of Test (Lab/Mammo/Etc): urine lab   Date of Test:   Ordering Provider: [place]   Where the test was performed:   Would the patient rather a call back or a response via [facility]? Call back   Best Call Back Number: [phone] or [phone]  Additional Information:  pt states that they do not know the results of a urine speciman. Please call the pt back to let them know.

## 2023-08-21 NOTE — TELEPHONE ENCOUNTER
----- Message from Arnel Mallory sent at 8/21/2023  1:36 PM CDT -----  .Type:  Needs Medical Advice    Who Called: [facility]   Symptoms (please be specific):    How long has patient had these symptoms:    Pharmacy name and phone #:    Would the patient rather a call back or a response via [facility]? Call back  Best Call Back Number: [phone]  Additional Information: calling to check on status of urine sample that was sent over last week

## 2023-08-22 NOTE — TELEPHONE ENCOUNTER
----- Message from Laila Beard sent at 8/22/2023  3:14 PM CDT -----  .Type:  RX Refill Request    Who Called: pt sister  Refill or New Rx:refill  RX Name and Strength:ferric gluconate (FERRLECIT) 125 mg in sodium chloride 0.9% 100 mL IVPB   [[id number]]  How is the patient currently taking it?  2XDay  Is this a 30 day or 90 day RX:180  Preferred Pharmacy with phone number:[id number] - [place] - [address]  Local or Mail Order:local   Ordering Provider:Chuyita  Would the patient rather a call back or a response via [facility]? Call back   Best Call Back Number:[phone]  Additional Information: pt requesting refill

## 2023-08-24 NOTE — TELEPHONE ENCOUNTER
----- Message from Arnel Mallory sent at 8/24/2023 11:34 AM CDT -----  .Type:  Needs Medical Advice    Who Called: Tamiko Payton  Symptoms (please be specific):    How long has patient had these symptoms:    Pharmacy name and phone #:    Would the patient rather a call back or a response via [facility]? Call back  Best Call Back Number: [phone]  Additional Information: calling about medication, states they left previous messages

## 2023-08-25 NOTE — ADDENDUM NOTE
Encounter addended by: Tim Grider LPN on: 8/25/2023 2:31 PM   Actions taken: Order Reconciliation Section accessed

## 2023-08-25 NOTE — PROGRESS NOTES
"Home Health: [facility]    Smoker   [] Yes  [x] No  Diabetic   [] Yes   [x] No   Low air loss mattress [] Yes [x] No (family is trying to get,has been ordered)    Is the patient eligible for a graft, and/or currently grafting?  [] Yes [x] No      Subjective:       Patient ID: Modesto Payton is a 48 y.o. male.    Chief Complaint: Pressure Ulcer (Right posterior elbow (HEALED)/Back /Right lower leg /Right posterior thigh /Sacrum/Right upper posterior arm/Left elbow /Left axilla/Left lower leg/Left posterior knee )    August 25th:  48-year-old black male, a quadriplegia, is here for follow up of his multiple pressure ulcers.  He has not had significant deterioration.  He is still waiting for his low air loss mattress.  His last visit was 1 month ago.  He needs his pain medication refilled.  There are no obviously infected wounds at this time.      48-year-old black male, who is a quadriplegia, is here for follow up of his multiple pressure ulcers.  These ulcers have not significantly deteriorated, are for the most part slowly improving.  He still has not received his low air loss mattress.  There are no new complaints today.  He has home health services twice a week.      Review of Systems   Constitutional: Negative.    Respiratory: Negative.     Cardiovascular: Negative.    Skin:         As documented in the HPI.   All other systems reviewed and are negative.      Objective:      Vitals:    08/25/23 1245   BP: 98/68   BP Location: Right arm   Patient Position: Lying   Pulse: (!) 112   Resp: 18   Weight: 74.8 kg (165 lb)   Height: 6' 2" (1.88 m)      Physical Exam  Vitals and nursing note reviewed. Exam conducted with a chaperone present.   Constitutional:       Appearance: Normal appearance.   Cardiovascular:      Rate and Rhythm: Normal rate.   Pulmonary:      Effort: Pulmonary effort is normal.   Skin:     General: Skin is warm and dry.      Comments: There are multiple stage III pressure ulcers on his posterior " side, and his elbow.  These wounds were dressed as previously.  I did silver nitrate for hypergranulation.  He tolerated all of this well.   Neurological:      Mental Status: He is alert.            Altered Skin Integrity 05/13/22 1237 Back #2 Other (comment) Full thickness tissue loss with exposed bone, tendon, or muscle. Often includes undermining and tunneling. May extend into muscle and/or supporting structures. (Active)   05/13/22 1237   Altered Skin Integrity Present on Admission - Did Patient arrive to the hospital with altered skin?: yes   Side:    Orientation:    Location: Back   Wound Number: #2   Is this injury device related?: No   Primary Wound Type: Other   Description of Altered Skin Integrity: Full thickness tissue loss with exposed bone, tendon, or muscle. Often includes undermining and tunneling. May extend into muscle and/or supporting structures.   Ankle-Brachial Index:    Pulses:    Removal Indication and Assessment:    Wound Outcome:    (Retired) Wound Length (cm):    (Retired) Wound Width (cm):    (Retired) Depth (cm):    Wound Description (Comments):    Removal Indications:    Dressing Appearance Moist drainage 08/25/23 1246   Drainage Amount Moderate 08/25/23 1246   Drainage Characteristics/Odor Serosanguineous 08/25/23 1246   Appearance Moist;Red;Hypergranulation 08/25/23 1246   Tissue loss description Full thickness 08/25/23 1246   Periwound Area Intact;Moist 08/25/23 1246   Wound Edges Open 08/25/23 1246   Wound Length (cm) 26 cm 08/25/23 1246   Wound Width (cm) 17.5 cm 08/25/23 1246   Wound Depth (cm) 0.2 cm 08/25/23 1246   Wound Volume (cm^3) 91 cm^3 08/25/23 1246   Wound Surface Area (cm^2) 455 cm^2 08/25/23 1246   Care Cleansed with:;Wound cleanser 08/25/23 1246   Dressing Applied;Other (comment) 08/25/23 1246   Dressing Change Due 08/26/23 08/25/23 1246            Altered Skin Integrity 05/13/22 1247 Right posterior Thigh #4 Other (comment) Full thickness tissue loss. Subcutaneous  fat may be visible but bone, tendon or muscle are not exposed (Active)   05/13/22 1247   Altered Skin Integrity Present on Admission - Did Patient arrive to the hospital with altered skin?: yes   Side: Right   Orientation: posterior   Location: Thigh   Wound Number: #4   Is this injury device related?: No   Primary Wound Type: Other   Description of Altered Skin Integrity: Full thickness tissue loss. Subcutaneous fat may be visible but bone, tendon or muscle are not exposed   Ankle-Brachial Index:    Pulses:    Removal Indication and Assessment:    Wound Outcome:    (Retired) Wound Length (cm):    (Retired) Wound Width (cm):    (Retired) Depth (cm):    Wound Description (Comments):    Removal Indications:    Dressing Appearance Moist drainage 08/25/23 1246   Drainage Amount Moderate 08/25/23 1246   Drainage Characteristics/Odor Serosanguineous 08/25/23 1246   Appearance Moist;Red;Hypergranulation 08/25/23 1246   Tissue loss description Full thickness 08/25/23 1246   Periwound Area Intact;Moist 08/25/23 1246   Wound Edges Open 08/25/23 1246   Wound Length (cm) 17 cm 08/25/23 1246   Wound Width (cm) 10 cm 08/25/23 1246   Wound Depth (cm) 0.2 cm 08/25/23 1246   Wound Volume (cm^3) 34 cm^3 08/25/23 1246   Wound Surface Area (cm^2) 170 cm^2 08/25/23 1246   Care Cleansed with:;Wound cleanser 08/25/23 1246   Dressing Applied;Other (comment) 08/25/23 1246   Dressing Change Due 08/26/23 08/25/23 1246            Altered Skin Integrity 05/13/22 0107 Sacral spine #5 Other (comment) Full thickness tissue loss with exposed bone, tendon, or muscle. Often includes undermining and tunneling. May extend into muscle and/or supporting structures. (Active)   05/13/22 0107   Altered Skin Integrity Present on Admission - Did Patient arrive to the hospital with altered skin?: yes   Side:    Orientation:    Location: Sacral spine   Wound Number: #5   Is this injury device related?: No   Primary Wound Type: Other   Description of Altered  Skin Integrity: Full thickness tissue loss with exposed bone, tendon, or muscle. Often includes undermining and tunneling. May extend into muscle and/or supporting structures.   Ankle-Brachial Index:    Pulses:    Removal Indication and Assessment:    Wound Outcome:    (Retired) Wound Length (cm):    (Retired) Wound Width (cm):    (Retired) Depth (cm):    Wound Description (Comments):    Removal Indications:    Dressing Appearance Moist drainage 08/25/23 1246   Drainage Amount Moderate 08/25/23 1246   Drainage Characteristics/Odor Serosanguineous 08/25/23 1246   Appearance Moist;Red;Hypergranulation 08/25/23 1246   Tissue loss description Full thickness 08/25/23 1246   Periwound Area Intact;Moist 08/25/23 1246   Wound Edges Open 08/25/23 1246   Wound Length (cm) 13 cm 08/25/23 1246   Wound Width (cm) 10 cm 08/25/23 1246   Wound Depth (cm) 0.2 cm 08/25/23 1246   Wound Volume (cm^3) 26 cm^3 08/25/23 1246   Wound Surface Area (cm^2) 130 cm^2 08/25/23 1246   Care Cleansed with:;Wound cleanser 08/25/23 1246   Dressing Applied;Other (comment) 08/25/23 1246   Dressing Change Due 08/26/23 08/25/23 1246            Altered Skin Integrity 03/31/23 1156 Right lower Leg #3 (Active)   03/31/23 1156   Altered Skin Integrity Present on Admission - Did Patient arrive to the hospital with altered skin?: yes   Side: Right   Orientation: lower   Location: Leg   Wound Number: #3   Is this injury device related?: No   Primary Wound Type:    Description of Altered Skin Integrity:    Ankle-Brachial Index:    Pulses:    Removal Indication and Assessment:    Wound Outcome:    (Retired) Wound Length (cm):    (Retired) Wound Width (cm):    (Retired) Depth (cm):    Wound Description (Comments):    Removal Indications:    Dressing Appearance Moist drainage 08/25/23 1246   Drainage Amount Moderate 08/25/23 1246   Drainage Characteristics/Odor Serosanguineous 08/25/23 1246   Appearance Moist;Red;Hypergranulation 08/25/23 1246   Tissue loss  description Full thickness 08/25/23 1246   Periwound Area Moist;Intact 08/25/23 1246   Wound Edges Open 08/25/23 1246   Wound Length (cm) 2.8 cm 08/25/23 1246   Wound Width (cm) 5.5 cm 08/25/23 1246   Wound Depth (cm) 0.2 cm 08/25/23 1246   Wound Volume (cm^3) 3.08 cm^3 08/25/23 1246   Wound Surface Area (cm^2) 15.4 cm^2 08/25/23 1246   Care Cleansed with:;Wound cleanser 08/25/23 1246   Dressing Applied;Other (comment) 08/25/23 1246   Dressing Change Due 08/26/23 08/25/23 1246            Altered Skin Integrity 05/26/23 1146 Right upper;posterior Arm #7 (Active)   05/26/23 1146   Altered Skin Integrity Present on Admission - Did Patient arrive to the hospital with altered skin?: yes   Side: Right   Orientation: upper;posterior   Location: Arm   Wound Number: #7   Is this injury device related?: No   Primary Wound Type:    Description of Altered Skin Integrity:    Ankle-Brachial Index:    Pulses:    Removal Indication and Assessment:    Wound Outcome:    (Retired) Wound Length (cm):    (Retired) Wound Width (cm):    (Retired) Depth (cm):    Wound Description (Comments):    Removal Indications:    Dressing Appearance Moist drainage 08/25/23 1246   Drainage Amount Moderate 08/25/23 1246   Drainage Characteristics/Odor Serosanguineous 08/25/23 1246   Appearance Moist;Red;Hypergranulation 08/25/23 1246   Tissue loss description Full thickness 08/25/23 1246   Periwound Area Intact 08/25/23 1246   Wound Edges Open 08/25/23 1246   Wound Length (cm) 21 cm 08/25/23 1246   Wound Width (cm) 2 cm 08/25/23 1246   Wound Depth (cm) 0.2 cm 08/25/23 1246   Wound Volume (cm^3) 8.4 cm^3 08/25/23 1246   Wound Surface Area (cm^2) 42 cm^2 08/25/23 1246   Care Cleansed with:;Wound cleanser 08/25/23 1246   Dressing Applied;Other (comment) 08/25/23 1246   Dressing Change Due 08/26/23 08/25/23 1246            Altered Skin Integrity 05/26/23 1147 Left Elbow #8 (Active)   05/26/23 1147   Altered Skin Integrity Present on Admission - Did Patient  arrive to the hospital with altered skin?: yes   Side: Left   Orientation:    Location: Elbow   Wound Number: #8   Is this injury device related?: No   Primary Wound Type:    Description of Altered Skin Integrity:    Ankle-Brachial Index:    Pulses:    Removal Indication and Assessment:    Wound Outcome:    (Retired) Wound Length (cm):    (Retired) Wound Width (cm):    (Retired) Depth (cm):    Wound Description (Comments):    Removal Indications:    Dressing Appearance Moist drainage 08/25/23 1246   Drainage Amount Moderate 08/25/23 1246   Drainage Characteristics/Odor Serosanguineous 08/25/23 1246   Appearance Moist;Slough;Granulating;Hypergranulation;Ecchymotic 08/25/23 1246   Tissue loss description Full thickness 08/25/23 1246   Periwound Area Intact 08/25/23 1246   Wound Edges Open 08/25/23 1246   Wound Length (cm) 5.6 cm 08/25/23 1246   Wound Width (cm) 4.5 cm 08/25/23 1246   Wound Depth (cm) 0.6 cm 08/25/23 1246   Wound Volume (cm^3) 15.12 cm^3 08/25/23 1246   Wound Surface Area (cm^2) 25.2 cm^2 08/25/23 1246   Care Cleansed with:;Wound cleanser 08/25/23 1246   Dressing Applied;Other (comment) 08/25/23 1246   Dressing Change Due 08/26/23 08/25/23 1246            Altered Skin Integrity 05/26/23 1147 Left Axilla #9 (Active)   05/26/23 1147   Altered Skin Integrity Present on Admission - Did Patient arrive to the hospital with altered skin?: yes   Side: Left   Orientation:    Location: Axilla   Wound Number: #9   Is this injury device related?: No   Primary Wound Type:    Description of Altered Skin Integrity:    Ankle-Brachial Index:    Pulses:    Removal Indication and Assessment:    Wound Outcome:    (Retired) Wound Length (cm):    (Retired) Wound Width (cm):    (Retired) Depth (cm):    Wound Description (Comments):    Removal Indications:    Dressing Appearance Moist drainage 08/25/23 1246   Drainage Amount Moderate 08/25/23 1246   Drainage Characteristics/Odor Serosanguineous 08/25/23 1246   Appearance  Moist;Red;Granulating 08/25/23 1246   Tissue loss description Full thickness 08/25/23 1246   Periwound Area Intact 08/25/23 1246   Wound Edges Open 08/25/23 1246   Wound Length (cm) 1 cm 08/25/23 1246   Wound Width (cm) 2.5 cm 08/25/23 1246   Wound Depth (cm) 0.2 cm 08/25/23 1246   Wound Volume (cm^3) 0.5 cm^3 08/25/23 1246   Wound Surface Area (cm^2) 2.5 cm^2 08/25/23 1246   Care Cleansed with:;Wound cleanser 08/25/23 1246   Dressing Applied;Other (comment) 08/25/23 1246   Dressing Change Due 08/26/23 08/25/23 1246            Altered Skin Integrity 05/26/23 1147 Left lower Leg #10 (Active)   05/26/23 1147   Altered Skin Integrity Present on Admission - Did Patient arrive to the hospital with altered skin?: yes   Side: Left   Orientation: lower   Location: Leg   Wound Number: #10   Is this injury device related?: No   Primary Wound Type:    Description of Altered Skin Integrity:    Ankle-Brachial Index:    Pulses:    Removal Indication and Assessment:    Wound Outcome:    (Retired) Wound Length (cm):    (Retired) Wound Width (cm):    (Retired) Depth (cm):    Wound Description (Comments):    Removal Indications:    Dressing Appearance Moist drainage 08/25/23 1246   Drainage Amount Moderate 08/25/23 1246   Drainage Characteristics/Odor Serosanguineous 08/25/23 1246   Appearance Moist;Red;Granulating;Hypergranulation 08/25/23 1246   Tissue loss description Full thickness 08/25/23 1246   Periwound Area Intact;Moist 08/25/23 1246   Wound Edges Open 08/25/23 1246   Wound Length (cm) 1 cm 08/25/23 1246   Wound Width (cm) 1.5 cm 08/25/23 1246   Wound Depth (cm) 0.2 cm 08/25/23 1246   Wound Volume (cm^3) 0.3 cm^3 08/25/23 1246   Wound Surface Area (cm^2) 1.5 cm^2 08/25/23 1246   Care Cleansed with:;Wound cleanser 08/25/23 1246   Dressing Applied;Other (comment) 08/25/23 1246   Dressing Change Due 08/26/23 08/25/23 1246            Altered Skin Integrity 05/26/23 1148 Left posterior Knee #11 (Active)   05/26/23 1148   Altered  Skin Integrity Present on Admission - Did Patient arrive to the hospital with altered skin?: yes   Side: Left   Orientation: posterior   Location: Knee   Wound Number: #11   Is this injury device related?: No   Primary Wound Type:    Description of Altered Skin Integrity:    Ankle-Brachial Index:    Pulses:    Removal Indication and Assessment:    Wound Outcome:    (Retired) Wound Length (cm):    (Retired) Wound Width (cm):    (Retired) Depth (cm):    Wound Description (Comments):    Removal Indications:    Dressing Appearance Moist drainage 08/25/23 1246   Drainage Amount Moderate 08/25/23 1246   Drainage Characteristics/Odor Serosanguineous 08/25/23 1246   Appearance Moist;Hypergranulation;Red 08/25/23 1246   Tissue loss description Full thickness 08/25/23 1246   Periwound Area Intact;Moist 08/25/23 1246   Wound Edges Open 08/25/23 1246   Wound Length (cm) 1.8 cm 08/25/23 1246   Wound Width (cm) 1.5 cm 08/25/23 1246   Wound Depth (cm) 0.2 cm 08/25/23 1246   Wound Volume (cm^3) 0.54 cm^3 08/25/23 1246   Wound Surface Area (cm^2) 2.7 cm^2 08/25/23 1246   Care Cleansed with:;Wound cleanser 08/25/23 1246   Dressing Applied;Other (comment) 08/25/23 1246   Dressing Change Due 08/26/23 08/25/23 1246     8/25/23    Left axilla     Sacrum     Right posterior thigh     Right lower leg     Back    Right posterior upper arm     Left posterior knee     Left lower leg     Left elbow         Assessment:         ICD-10-CM ICD-9-CM   1. Pressure injury of right leg, stage 4  L89.894 707.09     707.24   2. Pressure injury of sacral region, stage 4  L89.154 707.03     707.24   3. Pressure injury of right upper back, stage 4  L89.114 707.02     707.24   4. Pressure injury of left leg, stage 4  L89.894 707.09     707.24         Procedures:   Excisional debridement performed:  [] Yes [x] No   Selective debridement performed:  [] Yes [x] No   Mechanical debridement performed:  [] Yes [x] No   Silver nitrate applied :    [x] Yes [] No    Labs ordered this visit:   [] Yes [x] No   Imaging ordered this visit:   [] Yes [x] No   Tissue pathology and/or culture taken:  [] Yes [x] No     Procedures:  [facility]:  [facility]     Since 2/6/2023     [phone]     TIMES PER WEEK/DAYS:  ORDERS:  Right posterior elbow wound: Cleanse with wound cleanser, apply xeroform, and gentle foam border dressing to be changed daily   Back wound: Cleanse with wound cleanser, apply xeroform, ABD pad, and tape to be changed daily   Right lower leg wound: Cleanse with wound cleanser, apply xeroform, and gentle foam border dressing to be changed daily   Right posterior thigh wound: Cleanse with wound cleanser, apply xeroform, ABD pad, and tape to be changed daily   Sacral wound: Cleanse with wound cleanser, apply xeroform, ABD pad, and tape to be changed daily    Right upper posterior arm wound: Cleanse with wound cleanser, apply xeroform, and gentle foam border dressing to be changed daily   Left elbow wound: Cleanse with wound cleanser, apply xeroform, silver alginate, and gentle foam border dressing to be changed daily   Left axilla wound: Cleanse with wound cleanser, apply xeroform, and gentle foam border dressing to be changed daily   Left lower leg wound: Cleanse with wound cleanser, apply xeroform, ABD pad, and tape to be changed daily   Left posterior knee wound: Cleanse with wound cleanser, apply xeroform, and gentle foam border dressing to be changed daily     I prescribed hydrocodone 10 mg, number 90.    He will return here in 1 month.

## 2023-09-22 NOTE — PROGRESS NOTES
Home Health: [facility]   Smoker   [] Yes  [x] No  Diabetic   [] Yes   [x] No   Low air loss mattress [] Yes [x] No (family is trying to get,has been ordered)    Is the patient eligible for a graft, and/or currently grafting?  [] Yes [x] No      Subjective:       Patient ID: Modesto Payton is a 48 y.o. male.    Chief Complaint: Pressure Ulcer (Back/Right posterior thigh/Sacrum/Right lower leg /Right upper posterior arm/Left elbow/Left axilla/Left lower leg /Left posterior knee/Right elbow/Left upper chest /Left lower arm /Left medial knee/Left medial foot )    September 22nd:  48-year-old black male, with quadriplegia, is here for follow up of his multiple pressure injuries.  He now has 14 pressure ulcers.  His family and home health takes care of him.  There some new ulcers today.  There is a recent skin tear on his right elbow.      August 25th:  48-year-old black male, a quadriplegia, is here for follow up of his multiple pressure ulcers.  He has not had significant deterioration.  He is still waiting for his low air loss mattress.  His last visit was 1 month ago.  He needs his pain medication refilled.  There are no obviously infected wounds at this time.      48-year-old black male, who is a quadriplegia, is here for follow up of his multiple pressure ulcers.  These ulcers have not significantly deteriorated, are for the most part slowly improving.  He still has not received his low air loss mattress.  There are no new complaints today.  He has home health services twice a week.        Review of Systems   Constitutional: Negative.    Respiratory: Negative.     Cardiovascular: Negative.    Skin:         As documented in the HPI.   All other systems reviewed and are negative.        Objective:      There were no vitals filed for this visit.     Physical Exam  Vitals and nursing note reviewed. Exam conducted with a chaperone present.   Constitutional:       Appearance: Normal appearance.   Cardiovascular:       Rate and Rhythm: Normal rate.   Pulmonary:      Effort: Pulmonary effort is normal.   Skin:     General: Skin is warm and dry.      Comments: There are multiple stage III pressure ulcers, as documented by the nursing staff today.  They were clean, and dressed.  I used silver nitrate to treat the hypergranulation on most of these wounds.  None of the wounds needed surgical debridement today.   Neurological:      Mental Status: He is alert.            Altered Skin Integrity 05/13/22 1237 Back #2 Other (comment) Full thickness tissue loss with exposed bone, tendon, or muscle. Often includes undermining and tunneling. May extend into muscle and/or supporting structures. (Active)   05/13/22 1237   Altered Skin Integrity Present on Admission - Did Patient arrive to the hospital with altered skin?: yes   Side:    Orientation:    Location: Back   Wound Number: #2   Is this injury device related?: No   Primary Wound Type: Other   Description of Altered Skin Integrity: Full thickness tissue loss with exposed bone, tendon, or muscle. Often includes undermining and tunneling. May extend into muscle and/or supporting structures.   Ankle-Brachial Index:    Pulses:    Removal Indication and Assessment:    Wound Outcome:    (Retired) Wound Length (cm):    (Retired) Wound Width (cm):    (Retired) Depth (cm):    Wound Description (Comments):    Removal Indications:    Dressing Appearance Moist drainage 09/22/23 1232   Drainage Amount Moderate 09/22/23 1232   Drainage Characteristics/Odor Serosanguineous 09/22/23 1232   Appearance Red;Hypergranulation;Moist 09/22/23 1232   Tissue loss description Full thickness 09/22/23 1232   Periwound Area Intact 09/22/23 1232   Wound Edges Open 09/22/23 1232   Wound Length (cm) 24 cm 09/22/23 1232   Wound Width (cm) 17 cm 09/22/23 1232   Wound Depth (cm) 0.2 cm 09/22/23 1232   Wound Volume (cm^3) 81.6 cm^3 09/22/23 1232   Wound Surface Area (cm^2) 408 cm^2 09/22/23 1232   Care Cleansed with:;Wound  cleanser 09/22/23 1232   Dressing Applied 09/22/23 1232   Dressing Change Due 09/23/23 09/22/23 1232            Altered Skin Integrity 05/13/22 1247 Right posterior Thigh #4 Other (comment) Full thickness tissue loss. Subcutaneous fat may be visible but bone, tendon or muscle are not exposed (Active)   05/13/22 1247   Altered Skin Integrity Present on Admission - Did Patient arrive to the hospital with altered skin?: yes   Side: Right   Orientation: posterior   Location: Thigh   Wound Number: #4   Is this injury device related?: No   Primary Wound Type: Other   Description of Altered Skin Integrity: Full thickness tissue loss. Subcutaneous fat may be visible but bone, tendon or muscle are not exposed   Ankle-Brachial Index:    Pulses:    Removal Indication and Assessment:    Wound Outcome:    (Retired) Wound Length (cm):    (Retired) Wound Width (cm):    (Retired) Depth (cm):    Wound Description (Comments):    Removal Indications:    Dressing Appearance Moist drainage 09/22/23 1232   Drainage Amount Moderate 09/22/23 1232   Drainage Characteristics/Odor Serosanguineous 09/22/23 1232   Appearance Red;Hypergranulation;Moist 09/22/23 1232   Tissue loss description Full thickness 09/22/23 1232   Periwound Area Intact 09/22/23 1232   Wound Edges Open 09/22/23 1232   Wound Length (cm) 19 cm 09/22/23 1232   Wound Width (cm) 10 cm 09/22/23 1232   Wound Depth (cm) 0.2 cm 09/22/23 1232   Wound Volume (cm^3) 38 cm^3 09/22/23 1232   Wound Surface Area (cm^2) 190 cm^2 09/22/23 1232   Care Cleansed with:;Wound cleanser 09/22/23 1232   Dressing Applied 09/22/23 1232   Dressing Change Due 09/23/23 09/22/23 1232            Altered Skin Integrity 05/13/22 0107 Sacral spine #5 Other (comment) Full thickness tissue loss with exposed bone, tendon, or muscle. Often includes undermining and tunneling. May extend into muscle and/or supporting structures. (Active)   05/13/22 0107   Altered Skin Integrity Present on Admission - Did Patient  arrive to the hospital with altered skin?: yes   Side:    Orientation:    Location: Sacral spine   Wound Number: #5   Is this injury device related?: No   Primary Wound Type: Other   Description of Altered Skin Integrity: Full thickness tissue loss with exposed bone, tendon, or muscle. Often includes undermining and tunneling. May extend into muscle and/or supporting structures.   Ankle-Brachial Index:    Pulses:    Removal Indication and Assessment:    Wound Outcome:    (Retired) Wound Length (cm):    (Retired) Wound Width (cm):    (Retired) Depth (cm):    Wound Description (Comments):    Removal Indications:    Dressing Appearance Moist drainage 09/22/23 1232   Drainage Amount Moderate 09/22/23 1232   Drainage Characteristics/Odor Serosanguineous 09/22/23 1232   Appearance Red;Hypergranulation;Moist 09/22/23 1232   Tissue loss description Full thickness 09/22/23 1232   Periwound Area Intact 09/22/23 1232   Wound Edges Open 09/22/23 1232   Wound Length (cm) 8.5 cm 09/22/23 1232   Wound Width (cm) 6.5 cm 09/22/23 1232   Wound Depth (cm) 0.2 cm 09/22/23 1232   Wound Volume (cm^3) 11.05 cm^3 09/22/23 1232   Wound Surface Area (cm^2) 55.25 cm^2 09/22/23 1232   Care Cleansed with:;Wound cleanser 09/22/23 1232   Dressing Applied 09/22/23 1232   Dressing Change Due 09/23/23 09/22/23 1232            Altered Skin Integrity 03/31/23 1156 Right lower Leg #3 (Active)   03/31/23 1156   Altered Skin Integrity Present on Admission - Did Patient arrive to the hospital with altered skin?: yes   Side: Right   Orientation: lower   Location: Leg   Wound Number: #3   Is this injury device related?: No   Primary Wound Type:    Description of Altered Skin Integrity:    Ankle-Brachial Index:    Pulses:    Removal Indication and Assessment:    Wound Outcome:    (Retired) Wound Length (cm):    (Retired) Wound Width (cm):    (Retired) Depth (cm):    Wound Description (Comments):    Removal Indications:    Dressing Appearance Moist drainage  09/22/23 1232   Drainage Amount Moderate 09/22/23 1232   Drainage Characteristics/Odor Serosanguineous 09/22/23 1232   Appearance Red;Hypergranulation;Moist 09/22/23 1232   Tissue loss description Full thickness 09/22/23 1232   Periwound Area Intact 09/22/23 1232   Wound Edges Open 09/22/23 1232   Wound Length (cm) 27.5 cm 09/22/23 1232   Wound Width (cm) 5.4 cm 09/22/23 1232   Wound Depth (cm) 0.2 cm 09/22/23 1232   Wound Volume (cm^3) 29.7 cm^3 09/22/23 1232   Wound Surface Area (cm^2) 148.5 cm^2 09/22/23 1232   Care Cleansed with:;Wound cleanser 09/22/23 1232   Dressing Applied 09/22/23 1232   Dressing Change Due 09/22/23 09/22/23 1232            Altered Skin Integrity 05/26/23 1146 Right upper;posterior Arm #7 (Active)   05/26/23 1146   Altered Skin Integrity Present on Admission - Did Patient arrive to the hospital with altered skin?: yes   Side: Right   Orientation: upper;posterior   Location: Arm   Wound Number: #7   Is this injury device related?: No   Primary Wound Type:    Description of Altered Skin Integrity:    Ankle-Brachial Index:    Pulses:    Removal Indication and Assessment:    Wound Outcome:    (Retired) Wound Length (cm):    (Retired) Wound Width (cm):    (Retired) Depth (cm):    Wound Description (Comments):    Removal Indications:    Dressing Appearance Moist drainage 09/22/23 1232   Drainage Amount Moderate 09/22/23 1232   Drainage Characteristics/Odor Serosanguineous 09/22/23 1232   Appearance Red;Hypergranulation 09/22/23 1232   Tissue loss description Full thickness 09/22/23 1232   Periwound Area Intact 09/22/23 1232   Wound Edges Open 09/22/23 1232   Wound Length (cm) 15 cm 09/22/23 1232   Wound Width (cm) 2 cm 09/22/23 1232   Wound Depth (cm) 0.2 cm 09/22/23 1232   Wound Volume (cm^3) 6 cm^3 09/22/23 1232   Wound Surface Area (cm^2) 30 cm^2 09/22/23 1232   Care Cleansed with:;Wound cleanser 09/22/23 1232   Dressing Applied 09/22/23 1232   Dressing Change Due 09/23/23 09/22/23 1232             Altered Skin Integrity 05/26/23 1147 Left Elbow #8 (Active)   05/26/23 1147   Altered Skin Integrity Present on Admission - Did Patient arrive to the hospital with altered skin?: yes   Side: Left   Orientation:    Location: Elbow   Wound Number: #8   Is this injury device related?: No   Primary Wound Type:    Description of Altered Skin Integrity:    Ankle-Brachial Index:    Pulses:    Removal Indication and Assessment:    Wound Outcome:    (Retired) Wound Length (cm):    (Retired) Wound Width (cm):    (Retired) Depth (cm):    Wound Description (Comments):    Removal Indications:    Drainage Amount Moderate 09/22/23 1232   Drainage Characteristics/Odor Serosanguineous 09/22/23 1232   Appearance Red;Hypergranulation;Moist 09/22/23 1232   Tissue loss description Full thickness 09/22/23 1232   Periwound Area Intact 09/22/23 1232   Wound Edges Open 09/22/23 1232   Wound Length (cm) 5.5 cm 09/22/23 1232   Wound Width (cm) 4 cm 09/22/23 1232   Wound Depth (cm) 0.2 cm 09/22/23 1232   Wound Volume (cm^3) 4.4 cm^3 09/22/23 1232   Wound Surface Area (cm^2) 22 cm^2 09/22/23 1232   Care Cleansed with:;Wound cleanser 09/22/23 1232   Dressing Applied 09/22/23 1232   Dressing Change Due 09/23/23 09/22/23 1232            Altered Skin Integrity 05/26/23 1147 Left Axilla #9 (Active)   05/26/23 1147   Altered Skin Integrity Present on Admission - Did Patient arrive to the hospital with altered skin?: yes   Side: Left   Orientation:    Location: Axilla   Wound Number: #9   Is this injury device related?: No   Primary Wound Type:    Description of Altered Skin Integrity:    Ankle-Brachial Index:    Pulses:    Removal Indication and Assessment:    Wound Outcome:    (Retired) Wound Length (cm):    (Retired) Wound Width (cm):    (Retired) Depth (cm):    Wound Description (Comments):    Removal Indications:    Dressing Appearance Moist drainage 09/22/23 1232   Drainage Amount Moderate 09/22/23 1232   Drainage Characteristics/Odor  Serosanguineous 09/22/23 1232   Appearance Red;Hypergranulation;Moist 09/22/23 1232   Tissue loss description Full thickness 09/22/23 1232   Periwound Area Intact 09/22/23 1232   Wound Edges Open 09/22/23 1232   Wound Length (cm) 7.5 cm 09/22/23 1232   Wound Width (cm) 1 cm 09/22/23 1232   Wound Depth (cm) 0.2 cm 09/22/23 1232   Wound Volume (cm^3) 1.5 cm^3 09/22/23 1232   Wound Surface Area (cm^2) 7.5 cm^2 09/22/23 1232   Care Cleansed with:;Wound cleanser 09/22/23 1232   Dressing Applied 09/22/23 1232   Dressing Change Due 09/23/23 09/22/23 1232            Altered Skin Integrity 05/26/23 1147 Left lower Leg #10 (Active)   05/26/23 1147   Altered Skin Integrity Present on Admission - Did Patient arrive to the hospital with altered skin?: yes   Side: Left   Orientation: lower   Location: Leg   Wound Number: #10   Is this injury device related?: No   Primary Wound Type:    Description of Altered Skin Integrity:    Ankle-Brachial Index:    Pulses:    Removal Indication and Assessment:    Wound Outcome:    (Retired) Wound Length (cm):    (Retired) Wound Width (cm):    (Retired) Depth (cm):    Wound Description (Comments):    Removal Indications:    Dressing Appearance Moist drainage 09/22/23 1232   Drainage Amount Moderate 09/22/23 1232   Drainage Characteristics/Odor Serosanguineous 09/22/23 1232   Appearance Red;Hypergranulation;Moist 09/22/23 1232   Tissue loss description Full thickness 09/22/23 1232   Periwound Area Intact 09/22/23 1232   Wound Edges Open 09/22/23 1232   Wound Length (cm) 0.6 cm 09/22/23 1232   Wound Width (cm) 0.6 cm 09/22/23 1232   Wound Depth (cm) 0.2 cm 09/22/23 1232   Wound Volume (cm^3) 0.072 cm^3 09/22/23 1232   Wound Surface Area (cm^2) 0.36 cm^2 09/22/23 1232   Care Cleansed with:;Wound cleanser 09/22/23 1232   Dressing Applied 09/22/23 1232   Dressing Change Due 09/23/23 09/22/23 1232            Altered Skin Integrity 05/26/23 1148 Left posterior Knee #11 (Active)   05/26/23 1140    Altered Skin Integrity Present on Admission - Did Patient arrive to the hospital with altered skin?: yes   Side: Left   Orientation: posterior   Location: Knee   Wound Number: #11   Is this injury device related?: No   Primary Wound Type:    Description of Altered Skin Integrity:    Ankle-Brachial Index:    Pulses:    Removal Indication and Assessment:    Wound Outcome:    (Retired) Wound Length (cm):    (Retired) Wound Width (cm):    (Retired) Depth (cm):    Wound Description (Comments):    Removal Indications:    Dressing Appearance Moist drainage 09/22/23 1232   Drainage Amount Moderate 09/22/23 1232   Drainage Characteristics/Odor Serosanguineous 09/22/23 1232   Appearance Red;Hypergranulation;Moist 09/22/23 1232   Tissue loss description Full thickness 09/22/23 1232   Periwound Area Intact 09/22/23 1232   Wound Edges Open 09/22/23 1232   Wound Length (cm) 5.5 cm 09/22/23 1232   Wound Width (cm) 1.5 cm 09/22/23 1232   Wound Depth (cm) 0.2 cm 09/22/23 1232   Wound Volume (cm^3) 1.65 cm^3 09/22/23 1232   Wound Surface Area (cm^2) 8.25 cm^2 09/22/23 1232   Care Cleansed with:;Wound cleanser 09/22/23 1232   Dressing Applied 09/22/23 1232   Dressing Change Due 09/23/23 09/22/23 1232            Altered Skin Integrity 09/22/23 1251 Right Elbow #1 (Active)   09/22/23 1251   Altered Skin Integrity Present on Admission - Did Patient arrive to the hospital with altered skin?: yes   Side: Right   Orientation:    Location: Elbow   Wound Number: #1   Is this injury device related?: No   Primary Wound Type:    Description of Altered Skin Integrity:    Ankle-Brachial Index:    Pulses:    Removal Indication and Assessment:    Wound Outcome:    (Retired) Wound Length (cm):    (Retired) Wound Width (cm):    (Retired) Depth (cm):    Wound Description (Comments):    Removal Indications:    Dressing Appearance Moist drainage 09/22/23 1232   Drainage Amount Moderate 09/22/23 1232   Drainage Characteristics/Odor Serosanguineous  09/22/23 1232   Appearance Red;Hypergranulation;Moist 09/22/23 1232   Tissue loss description Full thickness 09/22/23 1232   Periwound Area Intact 09/22/23 1232   Wound Edges Open 09/22/23 1232   Wound Length (cm) 2 cm 09/22/23 1232   Wound Width (cm) 1 cm 09/22/23 1232   Wound Depth (cm) 0.2 cm 09/22/23 1232   Wound Volume (cm^3) 0.4 cm^3 09/22/23 1232   Wound Surface Area (cm^2) 2 cm^2 09/22/23 1232   Care Cleansed with:;Wound cleanser 09/22/23 1232   Dressing Applied 09/22/23 1232   Dressing Change Due 09/22/23 09/22/23 1232            Altered Skin Integrity 09/22/23 1251 Left upper Chest #12 (Active)   09/22/23 1251   Altered Skin Integrity Present on Admission - Did Patient arrive to the hospital with altered skin?: yes   Side: Left   Orientation: upper   Location: Chest   Wound Number: #12   Is this injury device related?: No   Primary Wound Type:    Description of Altered Skin Integrity:    Ankle-Brachial Index:    Pulses:    Removal Indication and Assessment:    Wound Outcome:    (Retired) Wound Length (cm):    (Retired) Wound Width (cm):    (Retired) Depth (cm):    Wound Description (Comments):    Removal Indications:    Dressing Appearance Moist drainage 09/22/23 1232   Drainage Amount Moderate 09/22/23 1232   Drainage Characteristics/Odor Serosanguineous 09/22/23 1232   Appearance Red;Hypergranulation;Moist 09/22/23 1232   Tissue loss description Full thickness 09/22/23 1232   Periwound Area Intact 09/22/23 1232   Wound Edges Open 09/22/23 1232   Wound Length (cm) 2 cm 09/22/23 1232   Wound Width (cm) 2 cm 09/22/23 1232   Wound Depth (cm) 0.2 cm 09/22/23 1232   Wound Volume (cm^3) 0.8 cm^3 09/22/23 1232   Wound Surface Area (cm^2) 4 cm^2 09/22/23 1232   Care Cleansed with:;Wound cleanser 09/22/23 1232   Dressing Applied 09/22/23 1232   Dressing Change Due 09/22/23 09/22/23 1232            Altered Skin Integrity 09/22/23 1252 Left lower Arm #13 (Active)   09/22/23 1252   Altered Skin Integrity Present on  Admission - Did Patient arrive to the hospital with altered skin?: yes   Side: Left   Orientation: lower   Location: Arm   Wound Number: #13   Is this injury device related?: No   Primary Wound Type:    Description of Altered Skin Integrity:    Ankle-Brachial Index:    Pulses:    Removal Indication and Assessment:    Wound Outcome:    (Retired) Wound Length (cm):    (Retired) Wound Width (cm):    (Retired) Depth (cm):    Wound Description (Comments):    Removal Indications:    Dressing Appearance Moist drainage 09/22/23 1232   Drainage Amount Moderate 09/22/23 1232   Drainage Characteristics/Odor Serosanguineous 09/22/23 1232   Appearance Red;Hypergranulation;Moist 09/22/23 1232   Tissue loss description Full thickness 09/22/23 1232   Periwound Area Intact 09/22/23 1232   Wound Edges Open 09/22/23 1232   Wound Length (cm) 4 cm 09/22/23 1232   Wound Width (cm) 1.5 cm 09/22/23 1232   Wound Depth (cm) 0.2 cm 09/22/23 1232   Wound Volume (cm^3) 1.2 cm^3 09/22/23 1232   Wound Surface Area (cm^2) 6 cm^2 09/22/23 1232   Care Cleansed with:;Wound cleanser 09/22/23 1232   Dressing Applied 09/22/23 1232   Dressing Change Due 09/23/23 09/22/23 1232            Altered Skin Integrity 09/22/23 1255 Left medial Knee #6 (Active)   09/22/23 1255   Altered Skin Integrity Present on Admission - Did Patient arrive to the hospital with altered skin?: yes   Side: Left   Orientation: medial   Location: Knee   Wound Number: #6   Is this injury device related?: No   Primary Wound Type:    Description of Altered Skin Integrity:    Ankle-Brachial Index:    Pulses:    Removal Indication and Assessment:    Wound Outcome:    (Retired) Wound Length (cm):    (Retired) Wound Width (cm):    (Retired) Depth (cm):    Wound Description (Comments):    Removal Indications:    Dressing Appearance Moist drainage 09/22/23 1232   Drainage Amount Moderate 09/22/23 1232   Drainage Characteristics/Odor Serosanguineous 09/22/23 1232   Appearance  Hypergranulation;Red;Moist 09/22/23 1232   Tissue loss description Full thickness 09/22/23 1232   Periwound Area Intact 09/22/23 1232   Wound Edges Open 09/22/23 1232   Wound Length (cm) 2.8 cm 09/22/23 1232   Wound Width (cm) 4.2 cm 09/22/23 1232   Wound Depth (cm) 0.2 cm 09/22/23 1232   Wound Volume (cm^3) 2.352 cm^3 09/22/23 1232   Wound Surface Area (cm^2) 11.76 cm^2 09/22/23 1232   Care Cleansed with:;Wound cleanser 09/22/23 1232   Dressing Applied 09/22/23 1232   Dressing Change Due 09/23/23 09/22/23 1232            Altered Skin Integrity 09/22/23 1256 Left medial Foot #14 (Active)   09/22/23 1256   Altered Skin Integrity Present on Admission - Did Patient arrive to the hospital with altered skin?: yes   Side: Left   Orientation: medial   Location: Foot   Wound Number: #14   Is this injury device related?: No   Primary Wound Type:    Description of Altered Skin Integrity:    Ankle-Brachial Index:    Pulses:    Removal Indication and Assessment:    Wound Outcome:    (Retired) Wound Length (cm):    (Retired) Wound Width (cm):    (Retired) Depth (cm):    Wound Description (Comments):    Removal Indications:    Dressing Appearance Moist drainage 09/22/23 1232   Drainage Amount Moderate 09/22/23 1232   Drainage Characteristics/Odor Serosanguineous 09/22/23 1232   Appearance Red;Hypergranulation;Moist 09/22/23 1232   Periwound Area Intact 09/22/23 1232   Wound Edges Open 09/22/23 1232   Wound Length (cm) 0.8 cm 09/22/23 1232   Wound Width (cm) 0.6 cm 09/22/23 1232   Wound Depth (cm) 0.2 cm 09/22/23 1232   Wound Volume (cm^3) 0.096 cm^3 09/22/23 1232   Wound Surface Area (cm^2) 0.48 cm^2 09/22/23 1232   Care Cleansed with:;Wound cleanser 09/22/23 1232   Dressing Applied 09/22/23 1232   Dressing Change Due 09/23/23 09/22/23 1232     09/22/23           Left medial foot          Left medial lower leg     Left posterior knee            Left medial knee                Back                    Right posterior thigh          Sacrum                       Right lower leg          Right posterior arm         Left lower leg                   Left axilla               Left lower arm         Left elbow                     Left upper chest        Right elbow   (Xeroform, ABD pads/gentle borders, tape)   ML      Assessment:           ICD-10-CM ICD-9-CM   1. Pressure injury of right leg, stage 4  L89.894 707.09     707.24   2. Pressure injury of sacral region, stage 4  L89.154 707.03     707.24   3. Pressure injury of right upper back, stage 4  L89.114 707.02     707.24   4. Pressure injury of left leg, stage 4  L89.894 707.09     707.24   5. Open wound of right elbow, subsequent encounter  S51.001D V58.89     881.01         Procedures:   Silver nitrate x8    Excisional debridement performed:  [] Yes [x] No   Selective debridement performed:  [] Yes [x] No   Mechanical debridement performed:  [] Yes [x] No   Silver nitrate applied :    [x] Yes [] No   Labs ordered this visit:   [] Yes [x] No   Imaging ordered this visit:   [] Yes [x] No   Tissue pathology and/or culture taken:  [] Yes [x] No         [facility]:  [facility]     Since 2/6/2023     [phone]     ORDERS:  Right elbow wound: Cleanse with wound cleanser, apply xeroform, and gentle foam border dressing to be changed daily   Back wound: Cleanse with wound cleanser, apply xeroform, ABD pad, and tape to be changed daily   Right lower leg wound: Cleanse with wound cleanser, apply xeroform, and gentle foam border dressing to be changed daily   Right posterior thigh wound: Cleanse with wound cleanser, apply xeroform, ABD pad, and tape to be changed daily   Sacral wound: Cleanse with wound cleanser, apply xeroform, ABD pad, and tape to be changed daily    Right upper posterior arm wound: Cleanse with wound cleanser, apply xeroform, and gentle foam border dressing to be changed daily   Left elbow wound: Cleanse with wound  cleanser, apply xeroform, silver alginate, and gentle foam border dressing to be changed daily   Left axilla wound: Cleanse with wound cleanser, apply xeroform, and gentle foam border dressing to be changed daily   Left lower leg wound: Cleanse with wound cleanser, apply xeroform, ABD pad, and tape to be changed daily   Left posterior knee wound: Cleanse with wound cleanser, apply xeroform, and gentle foam border dressing to be changed daily   Left upper chest wound: Cleanse with wound cleanser, apply xeroform, and gentle foam border dressing to be changed daily  Left lower arm: Cleanse with wound cleanser, apply xeroform, and gentle foam border dressing to be changed daily  Left medial knee: Cleanse with wound cleanser, apply xeroform, and gentle foam border dressing to be changed daily  Left medial foot: Cleanse with wound cleanser, apply xeroform, and gentle foam border dressing to be changed daily    I prescribed hydrocodone 10 mg, number 90 today.    He will return here in 1 month.

## 2023-11-03 PROBLEM — L89.613 PRESSURE INJURY OF RIGHT HEEL, STAGE 3: Status: ACTIVE | Noted: 2023-01-01

## 2023-11-03 PROBLEM — L89.623 PRESSURE INJURY OF LEFT HEEL, STAGE 3: Status: ACTIVE | Noted: 2023-01-01

## 2023-11-03 NOTE — PROGRESS NOTES
Home Health: [facility]    Smoker   [] Yes  [x] No  Diabetic   [] Yes   [x] No   Low air loss mattress [] Yes [x] No (family is trying to get,has been ordered)    Is the patient eligible for a graft, and/or currently grafting?  [] Yes [x] No      Subjective:       Patient ID: Modesto Payton is a 48 y.o. male.    Chief Complaint: Pressure Ulcer (Back/Right posterior thigh/Sacrum/Right lower leg /Right upper posterior arm/Left elbow/Left axilla/Left lower leg (HEALED)/Left posterior knee/Right elbow (HEALED)/Left upper chest /Left lower arm /Left medial knee/Left medial foot /Right heel (NEW)/Left heel (NEW))    November 3, 2023:  48-year-old black male, with quadriplegia, is here for follow up of his numerous pressure ulcers, totally 14.  All of his wounds were cleaned, measured, and silver nitrate was applied.  There are no new issues, other than his wounds are deepening somewhat.  He still has good family support.      September 22nd:  48-year-old black male, with quadriplegia, is here for follow up of his multiple pressure injuries.  He now has 14 pressure ulcers.  His family and home health takes care of him.  There some new ulcers today.  There is a recent skin tear on his right elbow.      August 25th:  48-year-old black male, a quadriplegia, is here for follow up of his multiple pressure ulcers.  He has not had significant deterioration.  He is still waiting for his low air loss mattress.  His last visit was 1 month ago.  He needs his pain medication refilled.  There are no obviously infected wounds at this time.      48-year-old black male, who is a quadriplegia, is here for follow up of his multiple pressure ulcers.  These ulcers have not significantly deteriorated, are for the most part slowly improving.  He still has not received his low air loss mattress.  There are no new complaints today.  He has home health services twice a week.        Review of Systems   Constitutional: Negative.    Respiratory:  "Negative.     Cardiovascular: Negative.    Skin:         As documented in the HPI.   All other systems reviewed and are negative.        Objective:      Vitals:    11/03/23 1249   BP: 98/68   BP Location: Right arm   Patient Position: Lying   Pulse: 98   Resp: 20   Height: 6' 2" (1.88 m)        Physical Exam  Vitals and nursing note reviewed. Exam conducted with a chaperone present.   Constitutional:       Appearance: Normal appearance.   Cardiovascular:      Rate and Rhythm: Normal rate.   Pulmonary:      Effort: Pulmonary effort is normal.   Skin:     General: Skin is warm and dry.      Comments: All of his wounds were clean by the staff.  After I applied silver nitrate to all of his wounds, they were covered with Xeroform.  The wounds were dressed with ABD pads.  There was no surgical debridement necessary.  Also, I clipped at least 15 of his toenails and fingernails.   Neurological:      Mental Status: He is alert.            Altered Skin Integrity 05/13/22 1237 Back #2 Other (comment) Full thickness tissue loss with exposed bone, tendon, or muscle. Often includes undermining and tunneling. May extend into muscle and/or supporting structures. (Active)   05/13/22 1237   Altered Skin Integrity Present on Admission - Did Patient arrive to the hospital with altered skin?: yes   Side:    Orientation:    Location: Back   Wound Number: #2   Is this injury device related?: No   Primary Wound Type: Other   Description of Altered Skin Integrity: Full thickness tissue loss with exposed bone, tendon, or muscle. Often includes undermining and tunneling. May extend into muscle and/or supporting structures.   Ankle-Brachial Index:    Pulses:    Removal Indication and Assessment:    Wound Outcome:    (Retired) Wound Length (cm):    (Retired) Wound Width (cm):    (Retired) Depth (cm):    Wound Description (Comments):    Removal Indications:    Dressing Appearance Moist drainage 11/03/23 1250   Drainage Amount Moderate 11/03/23 " 1250   Drainage Characteristics/Odor Serosanguineous 11/03/23 1250   Appearance Moist;Red;Granulating;Slough 11/03/23 1250   Tissue loss description Full thickness 11/03/23 1250   Periwound Area Intact;Moist 11/03/23 1250   Wound Edges Open 11/03/23 1250   Wound Length (cm) 23 cm 11/03/23 1250   Wound Width (cm) 25 cm 11/03/23 1250   Wound Depth (cm) 0.2 cm 11/03/23 1250   Wound Volume (cm^3) 115 cm^3 11/03/23 1250   Wound Surface Area (cm^2) 575 cm^2 11/03/23 1250   Care Cleansed with:;Wound cleanser 11/03/23 1250   Dressing Applied;Other (comment) 11/03/23 1250   Dressing Change Due 11/04/23 11/03/23 1250            Altered Skin Integrity 05/13/22 1247 Right posterior Thigh #4 Other (comment) Full thickness tissue loss. Subcutaneous fat may be visible but bone, tendon or muscle are not exposed (Active)   05/13/22 1247   Altered Skin Integrity Present on Admission - Did Patient arrive to the hospital with altered skin?: yes   Side: Right   Orientation: posterior   Location: Thigh   Wound Number: #4   Is this injury device related?: No   Primary Wound Type: Other   Description of Altered Skin Integrity: Full thickness tissue loss. Subcutaneous fat may be visible but bone, tendon or muscle are not exposed   Ankle-Brachial Index:    Pulses:    Removal Indication and Assessment:    Wound Outcome:    (Retired) Wound Length (cm):    (Retired) Wound Width (cm):    (Retired) Depth (cm):    Wound Description (Comments):    Removal Indications:    Dressing Appearance Moist drainage 11/03/23 1250   Drainage Amount Moderate 11/03/23 1250   Drainage Characteristics/Odor Serosanguineous 11/03/23 1250   Appearance Moist;Red;Slough;Granulating 11/03/23 1250   Tissue loss description Full thickness 11/03/23 1250   Periwound Area Intact;Moist 11/03/23 1250   Wound Edges Open 11/03/23 1250   Wound Length (cm) 118 cm 11/03/23 1250   Wound Width (cm) 11 cm 11/03/23 1250   Wound Depth (cm) 0.2 cm 11/03/23 1250   Wound Volume (cm^3)  259.6 cm^3 11/03/23 1250   Wound Surface Area (cm^2) 1298 cm^2 11/03/23 1250   Care Cleansed with:;Wound cleanser 11/03/23 1250   Dressing Applied;Other (comment) 11/03/23 1250   Dressing Change Due 11/04/23 11/03/23 1250            Altered Skin Integrity 05/13/22 0107 Sacral spine #5 Other (comment) Full thickness tissue loss with exposed bone, tendon, or muscle. Often includes undermining and tunneling. May extend into muscle and/or supporting structures. (Active)   05/13/22 0107   Altered Skin Integrity Present on Admission - Did Patient arrive to the hospital with altered skin?: yes   Side:    Orientation:    Location: Sacral spine   Wound Number: #5   Is this injury device related?: No   Primary Wound Type: Other   Description of Altered Skin Integrity: Full thickness tissue loss with exposed bone, tendon, or muscle. Often includes undermining and tunneling. May extend into muscle and/or supporting structures.   Ankle-Brachial Index:    Pulses:    Removal Indication and Assessment:    Wound Outcome:    (Retired) Wound Length (cm):    (Retired) Wound Width (cm):    (Retired) Depth (cm):    Wound Description (Comments):    Removal Indications:    Dressing Appearance Moist drainage 11/03/23 1250   Drainage Amount Moderate 11/03/23 1250   Drainage Characteristics/Odor Serosanguineous 11/03/23 1250   Appearance Moist;Red;Granulating;Slough 11/03/23 1250   Tissue loss description Full thickness 11/03/23 1250   Periwound Area Intact;Moist 11/03/23 1250   Wound Edges Open 11/03/23 1250   Wound Length (cm) 12.5 cm 11/03/23 1250   Wound Width (cm) 10 cm 11/03/23 1250   Wound Depth (cm) 0.2 cm 11/03/23 1250   Wound Volume (cm^3) 25 cm^3 11/03/23 1250   Wound Surface Area (cm^2) 125 cm^2 11/03/23 1250   Care Cleansed with:;Wound cleanser 11/03/23 1250   Dressing Applied;Other (comment) 11/03/23 1250   Dressing Change Due 11/04/23 11/03/23 1250            Altered Skin Integrity 03/31/23 1156 Right lower Leg #3 (Active)    03/31/23 1156   Altered Skin Integrity Present on Admission - Did Patient arrive to the hospital with altered skin?: yes   Side: Right   Orientation: lower   Location: Leg   Wound Number: #3   Is this injury device related?: No   Primary Wound Type:    Description of Altered Skin Integrity:    Ankle-Brachial Index:    Pulses:    Removal Indication and Assessment:    Wound Outcome:    (Retired) Wound Length (cm):    (Retired) Wound Width (cm):    (Retired) Depth (cm):    Wound Description (Comments):    Removal Indications:    Dressing Appearance Moist drainage 11/03/23 1250   Drainage Amount Moderate 11/03/23 1250   Drainage Characteristics/Odor Serosanguineous 11/03/23 1250   Appearance Moist;Red;Granulating;Slough 11/03/23 1250   Tissue loss description Full thickness 11/03/23 1250   Periwound Area Intact;Moist 11/03/23 1250   Wound Edges Open 11/03/23 1250   Wound Length (cm) 30 cm 11/03/23 1250   Wound Width (cm) 4.5 cm 11/03/23 1250   Wound Depth (cm) 0.2 cm 11/03/23 1250   Wound Volume (cm^3) 27 cm^3 11/03/23 1250   Wound Surface Area (cm^2) 135 cm^2 11/03/23 1250   Care Cleansed with:;Wound cleanser 11/03/23 1250   Dressing Applied;Other (comment) 11/03/23 1250   Dressing Change Due 11/04/23 11/03/23 1250            Altered Skin Integrity 05/26/23 1146 Right upper;posterior Arm #7 (Active)   05/26/23 1146   Altered Skin Integrity Present on Admission - Did Patient arrive to the hospital with altered skin?: yes   Side: Right   Orientation: upper;posterior   Location: Arm   Wound Number: #7   Is this injury device related?: No   Primary Wound Type:    Description of Altered Skin Integrity:    Ankle-Brachial Index:    Pulses:    Removal Indication and Assessment:    Wound Outcome:    (Retired) Wound Length (cm):    (Retired) Wound Width (cm):    (Retired) Depth (cm):    Wound Description (Comments):    Removal Indications:    Dressing Appearance Moist drainage 11/03/23 1250   Drainage Amount Large 11/03/23  1250   Drainage Characteristics/Odor Serosanguineous 11/03/23 1250   Appearance Moist;Red;Granulating;Slough 11/03/23 1250   Tissue loss description Full thickness 11/03/23 1250   Periwound Area Intact;Moist 11/03/23 1250   Wound Edges Open 11/03/23 1250   Wound Length (cm) 12.5 cm 11/03/23 1250   Wound Width (cm) 2 cm 11/03/23 1250   Wound Depth (cm) 0.2 cm 11/03/23 1250   Wound Volume (cm^3) 5 cm^3 11/03/23 1250   Wound Surface Area (cm^2) 25 cm^2 11/03/23 1250   Care Cleansed with:;Wound cleanser 11/03/23 1250   Dressing Applied;Other (comment) 11/03/23 1250   Dressing Change Due 11/04/23 11/03/23 1250            Altered Skin Integrity 05/26/23 1147 Left Elbow #8 (Active)   05/26/23 1147   Altered Skin Integrity Present on Admission - Did Patient arrive to the hospital with altered skin?: yes   Side: Left   Orientation:    Location: Elbow   Wound Number: #8   Is this injury device related?: No   Primary Wound Type:    Description of Altered Skin Integrity:    Ankle-Brachial Index:    Pulses:    Removal Indication and Assessment:    Wound Outcome:    (Retired) Wound Length (cm):    (Retired) Wound Width (cm):    (Retired) Depth (cm):    Wound Description (Comments):    Removal Indications:    Dressing Appearance Moist drainage 11/03/23 1250   Drainage Amount Large 11/03/23 1250   Drainage Characteristics/Odor Serosanguineous 11/03/23 1250   Appearance Moist;Red;Slough;Granulating 11/03/23 1250   Tissue loss description Full thickness 11/03/23 1250   Periwound Area Intact;Moist 11/03/23 1250   Wound Edges Open 11/03/23 1250   Wound Length (cm) 4.7 cm 11/03/23 1250   Wound Width (cm) 4.4 cm 11/03/23 1250   Wound Depth (cm) 0.2 cm 11/03/23 1250   Wound Volume (cm^3) 4.136 cm^3 11/03/23 1250   Wound Surface Area (cm^2) 20.68 cm^2 11/03/23 1250   Care Cleansed with:;Wound cleanser 11/03/23 1250   Dressing Applied;Other (comment) 11/03/23 1250   Dressing Change Due 11/04/23 11/03/23 1250            Altered Skin  Integrity 05/26/23 1147 Left Axilla #9 (Active)   05/26/23 1147   Altered Skin Integrity Present on Admission - Did Patient arrive to the hospital with altered skin?: yes   Side: Left   Orientation:    Location: Axilla   Wound Number: #9   Is this injury device related?: No   Primary Wound Type:    Description of Altered Skin Integrity:    Ankle-Brachial Index:    Pulses:    Removal Indication and Assessment:    Wound Outcome:    (Retired) Wound Length (cm):    (Retired) Wound Width (cm):    (Retired) Depth (cm):    Wound Description (Comments):    Removal Indications:    Dressing Appearance Moist drainage 11/03/23 1250   Drainage Amount Moderate 11/03/23 1250   Drainage Characteristics/Odor Serosanguineous 11/03/23 1250   Appearance Moist;Red;Slough;Granulating 11/03/23 1250   Tissue loss description Full thickness 11/03/23 1250   Periwound Area Intact;Moist 11/03/23 1250   Wound Edges Open 11/03/23 1250   Wound Length (cm) 1 cm 11/03/23 1250   Wound Width (cm) 0.5 cm 11/03/23 1250   Wound Depth (cm) 0.2 cm 11/03/23 1250   Wound Volume (cm^3) 0.1 cm^3 11/03/23 1250   Wound Surface Area (cm^2) 0.5 cm^2 11/03/23 1250   Care Cleansed with:;Wound cleanser 11/03/23 1250   Dressing Applied;Other (comment) 11/03/23 1250   Dressing Change Due 11/04/23 11/03/23 1250            Altered Skin Integrity 05/26/23 1148 Left posterior Knee #11 (Active)   05/26/23 1148   Altered Skin Integrity Present on Admission - Did Patient arrive to the hospital with altered skin?: yes   Side: Left   Orientation: posterior   Location: Knee   Wound Number: #11   Is this injury device related?: No   Primary Wound Type:    Description of Altered Skin Integrity:    Ankle-Brachial Index:    Pulses:    Removal Indication and Assessment:    Wound Outcome:    (Retired) Wound Length (cm):    (Retired) Wound Width (cm):    (Retired) Depth (cm):    Wound Description (Comments):    Removal Indications:    Dressing Appearance Moist drainage 11/03/23 1250    Drainage Amount Moderate 11/03/23 1250   Drainage Characteristics/Odor Serosanguineous 11/03/23 1250   Appearance Moist;Red;Granulating;Slough 11/03/23 1250   Tissue loss description Full thickness 11/03/23 1250   Periwound Area Intact;Moist 11/03/23 1250   Wound Edges Open 11/03/23 1250   Wound Length (cm) 0.7 cm 11/03/23 1250   Wound Width (cm) 0.8 cm 11/03/23 1250   Wound Depth (cm) 0.2 cm 11/03/23 1250   Wound Volume (cm^3) 0.112 cm^3 11/03/23 1250   Wound Surface Area (cm^2) 0.56 cm^2 11/03/23 1250   Care Cleansed with:;Wound cleanser 11/03/23 1250   Dressing Applied;Other (comment) 11/03/23 1250   Dressing Change Due 11/04/23 11/03/23 1250            Altered Skin Integrity 09/22/23 1251 Left upper Chest #12 (Active)   09/22/23 1251   Altered Skin Integrity Present on Admission - Did Patient arrive to the hospital with altered skin?: yes   Side: Left   Orientation: upper   Location: Chest   Wound Number: #12   Is this injury device related?: No   Primary Wound Type:    Description of Altered Skin Integrity:    Ankle-Brachial Index:    Pulses:    Removal Indication and Assessment:    Wound Outcome:    (Retired) Wound Length (cm):    (Retired) Wound Width (cm):    (Retired) Depth (cm):    Wound Description (Comments):    Removal Indications:    Dressing Appearance Moist drainage 11/03/23 1250   Drainage Amount Copious 11/03/23 1250   Drainage Characteristics/Odor Serosanguineous 11/03/23 1250   Appearance Moist;Red;Slough;Granulating 11/03/23 1250   Tissue loss description Full thickness 11/03/23 1250   Periwound Area Intact;Moist 11/03/23 1250   Wound Edges Open 11/03/23 1250   Wound Length (cm) 2.5 cm 11/03/23 1250   Wound Width (cm) 3.3 cm 11/03/23 1250   Wound Depth (cm) 0.2 cm 11/03/23 1250   Wound Volume (cm^3) 1.65 cm^3 11/03/23 1250   Wound Surface Area (cm^2) 8.25 cm^2 11/03/23 1250   Care Cleansed with:;Wound cleanser 11/03/23 1250   Dressing Applied;Other (comment) 11/03/23 1250   Dressing Change Due  11/04/23 11/03/23 1250            Altered Skin Integrity 09/22/23 1252 Left lower Arm #13 (Active)   09/22/23 1252   Altered Skin Integrity Present on Admission - Did Patient arrive to the hospital with altered skin?: yes   Side: Left   Orientation: lower   Location: Arm   Wound Number: #13   Is this injury device related?: No   Primary Wound Type:    Description of Altered Skin Integrity:    Ankle-Brachial Index:    Pulses:    Removal Indication and Assessment:    Wound Outcome:    (Retired) Wound Length (cm):    (Retired) Wound Width (cm):    (Retired) Depth (cm):    Wound Description (Comments):    Removal Indications:    Dressing Appearance Moist drainage 11/03/23 1250   Drainage Amount Copious 11/03/23 1250   Drainage Characteristics/Odor Serosanguineous 11/03/23 1250   Appearance Moist;Red;Granulating 11/03/23 1250   Tissue loss description Full thickness 11/03/23 1250   Periwound Area Intact;Moist 11/03/23 1250   Wound Edges Open 11/03/23 1250   Wound Length (cm) 7.5 cm 11/03/23 1250   Wound Width (cm) 2.5 cm 11/03/23 1250   Wound Depth (cm) 0.5 cm 11/03/23 1250   Wound Volume (cm^3) 9.375 cm^3 11/03/23 1250   Wound Surface Area (cm^2) 18.75 cm^2 11/03/23 1250   Care Cleansed with:;Wound cleanser 11/03/23 1250   Dressing Applied;Other (comment) 11/03/23 1250   Dressing Change Due 11/04/23 11/03/23 1250            Altered Skin Integrity 09/22/23 1255 Left medial Knee #6 (Active)   09/22/23 1255   Altered Skin Integrity Present on Admission - Did Patient arrive to the hospital with altered skin?: yes   Side: Left   Orientation: medial   Location: Knee   Wound Number: #6   Is this injury device related?: No   Primary Wound Type:    Description of Altered Skin Integrity:    Ankle-Brachial Index:    Pulses:    Removal Indication and Assessment:    Wound Outcome:    (Retired) Wound Length (cm):    (Retired) Wound Width (cm):    (Retired) Depth (cm):    Wound Description (Comments):    Removal Indications:     Dressing Appearance Moist drainage 11/03/23 1250   Drainage Amount Copious 11/03/23 1250   Drainage Characteristics/Odor Serosanguineous 11/03/23 1250   Appearance Moist;Red;Granulating;Slough 11/03/23 1250   Tissue loss description Full thickness 11/03/23 1250   Periwound Area Intact;Moist 11/03/23 1250   Wound Edges Open 11/03/23 1250   Wound Length (cm) 6.5 cm 11/03/23 1250   Wound Width (cm) 6.5 cm 11/03/23 1250   Wound Depth (cm) 0.2 cm 11/03/23 1250   Wound Volume (cm^3) 8.45 cm^3 11/03/23 1250   Wound Surface Area (cm^2) 42.25 cm^2 11/03/23 1250   Care Cleansed with:;Wound cleanser 11/03/23 1250   Dressing Applied;Other (comment) 11/03/23 1250   Dressing Change Due 11/04/23 11/03/23 1250            Altered Skin Integrity 09/22/23 1256 Left medial Foot #14 (Active)   09/22/23 1256   Altered Skin Integrity Present on Admission - Did Patient arrive to the hospital with altered skin?: yes   Side: Left   Orientation: medial   Location: Foot   Wound Number: #14   Is this injury device related?: No   Primary Wound Type:    Description of Altered Skin Integrity:    Ankle-Brachial Index:    Pulses:    Removal Indication and Assessment:    Wound Outcome:    (Retired) Wound Length (cm):    (Retired) Wound Width (cm):    (Retired) Depth (cm):    Wound Description (Comments):    Removal Indications:    Dressing Appearance Moist drainage 11/03/23 1250   Drainage Amount Large 11/03/23 1250   Drainage Characteristics/Odor Serosanguineous 11/03/23 1250   Appearance Moist;Red;Granulating;Slough 11/03/23 1250   Tissue loss description Full thickness 11/03/23 1250   Periwound Area Intact;Moist 11/03/23 1250   Wound Edges Open 11/03/23 1250   Wound Length (cm) 14 cm 11/03/23 1250   Wound Width (cm) 6 cm 11/03/23 1250   Wound Depth (cm) 0.2 cm 11/03/23 1250   Wound Volume (cm^3) 16.8 cm^3 11/03/23 1250   Wound Surface Area (cm^2) 84 cm^2 11/03/23 1250   Care Cleansed with:;Wound cleanser 11/03/23 1250   Dressing Applied;Other  (comment) 11/03/23 1250   Dressing Change Due 11/04/23 11/03/23 1250            Altered Skin Integrity 11/03/23 1313 Right Heel (Active)   11/03/23 1313   Altered Skin Integrity Present on Admission - Did Patient arrive to the hospital with altered skin?: yes   Side: Right   Orientation:    Location: Heel   Wound Number:    Is this injury device related?: No   Primary Wound Type:    Description of Altered Skin Integrity:    Ankle-Brachial Index:    Pulses:    Removal Indication and Assessment:    Wound Outcome:    (Retired) Wound Length (cm):    (Retired) Wound Width (cm):    (Retired) Depth (cm):    Wound Description (Comments):    Removal Indications:    Dressing Appearance Moist drainage 11/03/23 1250   Drainage Amount Moderate 11/03/23 1250   Drainage Characteristics/Odor Serosanguineous 11/03/23 1250   Appearance Moist;Granulating;Slough 11/03/23 1250   Tissue loss description Full thickness 11/03/23 1250   Periwound Area Intact;Moist 11/03/23 1250   Wound Edges Open 11/03/23 1250   Wound Length (cm) 1 cm 11/03/23 1250   Wound Width (cm) 1.5 cm 11/03/23 1250   Wound Depth (cm) 0.2 cm 11/03/23 1250   Wound Volume (cm^3) 0.3 cm^3 11/03/23 1250   Wound Surface Area (cm^2) 1.5 cm^2 11/03/23 1250   Care Cleansed with:;Wound cleanser 11/03/23 1250   Dressing Applied;Other (comment) 11/03/23 1250   Dressing Change Due 11/04/23 11/03/23 1250            Altered Skin Integrity 11/03/23 1313 Left Heel (Active)   11/03/23 1313   Altered Skin Integrity Present on Admission - Did Patient arrive to the hospital with altered skin?: yes   Side: Left   Orientation:    Location: Heel   Wound Number:    Is this injury device related?: No   Primary Wound Type:    Description of Altered Skin Integrity:    Ankle-Brachial Index:    Pulses:    Removal Indication and Assessment:    Wound Outcome:    (Retired) Wound Length (cm):    (Retired) Wound Width (cm):    (Retired) Depth (cm):    Wound Description (Comments):    Removal  Indications:    Dressing Appearance Moist drainage 11/03/23 1250   Drainage Amount Moderate 11/03/23 1250   Drainage Characteristics/Odor Serosanguineous 11/03/23 1250   Appearance Red;Slough;Granulating 11/03/23 1250   Tissue loss description Full thickness 11/03/23 1250   Periwound Area Intact;Moist 11/03/23 1250   Wound Edges Open 11/03/23 1250   Wound Length (cm) 2 cm 11/03/23 1250   Wound Width (cm) 2 cm 11/03/23 1250   Wound Depth (cm) 0.2 cm 11/03/23 1250   Wound Volume (cm^3) 0.8 cm^3 11/03/23 1250   Wound Surface Area (cm^2) 4 cm^2 11/03/23 1250   Care Cleansed with:;Wound cleanser 11/03/23 1250   Dressing Applied;Other (comment) 11/03/23 1250   Dressing Change Due 11/04/23 11/03/23 1250     11/3/2023                                                        Xeroform, ABD pad/gentle foam border dressing, tape   CT      Assessment:           ICD-10-CM ICD-9-CM   1. Pressure injury of right leg, stage 4  L89.894 707.09     707.24   2. Pressure injury of sacral region, stage 4  L89.154 707.03     707.24   3. Pressure injury of right upper back, stage 4  L89.114 707.02     707.24   4. Pressure injury of left leg, stage 4  L89.894 707.09     707.24   5. Open wound of right elbow, subsequent encounter  S51.001D V58.89     881.01   6. Open wound of right elbow, initial encounter  S51.001A 881.01   7. Pressure injury of right heel, stage 3  L89.613 707.07     707.23   8. Pressure injury of left heel, stage 3  L89.623 707.07     707.23         Procedures:   Silver nitrate x8    Excisional debridement performed:  [] Yes [x] No   Selective debridement performed:  [] Yes [x] No   Mechanical debridement performed:  [] Yes [x] No   Silver nitrate applied :    [x] Yes [] No   Labs ordered this visit:   [] Yes [x] No   Imaging ordered this visit:   [] Yes [x] No   Tissue pathology and/or culture taken:  [] Yes [x] No         [facility]:  [facility]     Since 2/6/2023      [phone]     ORDERS:  Back wound: Cleanse with wound cleanser, apply xeroform, ABD pad, and tape to be changed daily   Right lower leg wound: Cleanse with wound cleanser, apply xeroform, and gentle foam border dressing to be changed daily   Right posterior thigh wound: Cleanse with wound cleanser, apply xeroform, ABD pad, and tape to be changed daily   Sacral wound: Cleanse with wound cleanser, apply xeroform, ABD pad, and tape to be changed daily    Right upper posterior arm wound: Cleanse with wound cleanser, apply xeroform, and gentle foam border dressing to be changed daily   Left elbow wound: Cleanse with wound cleanser, apply xeroform, silver alginate, and gentle foam border dressing to be changed daily   Left axilla wound: Cleanse with wound cleanser, apply xeroform, and gentle foam border dressing to be changed daily   Left posterior knee wound: Cleanse with wound cleanser, apply xeroform, and gentle foam border dressing to be changed daily   Left upper chest wound: Cleanse with wound cleanser, apply xeroform, and gentle foam border dressing to be changed daily  Left lower arm: Cleanse with wound cleanser, apply xeroform, and gentle foam border dressing to be changed daily  Left medial knee: Cleanse with wound cleanser, apply xeroform, and gentle foam border dressing to be changed daily  Left medial foot: Cleanse with wound cleanser, apply xeroform, and gentle foam border dressing to be changed daily      He will return here in 1 month.    Hydrocodone 10 mg, number 90, prescribed.

## 2023-12-04 NOTE — TELEPHONE ENCOUNTER
----- Message from Arnel Mallory sent at 12/4/2023  3:14 PM CST -----  .Type:  Needs Medical Advice    Who Called: [facility]  Symptoms (please be specific):    How long has patient had these symptoms:    Pharmacy name and phone #:    Would the patient rather a call back or a response via [facility]? Call back  Best Call Back Number: [phone]  Additional Information: calling for an order [facility]

## 2023-12-11 NOTE — PROGRESS NOTES
Home Health: [facility]   Smoker   [] Yes  [x] No  Diabetic   [] Yes   [x] No   Low air loss mattress [] Yes [x] No (family is trying to get,has been ordered)    Is the patient eligible for a graft, and/or currently grafting?  [] Yes [x] No       NOTES:  Unable to take pictures today due to EPIC not working properly. Nothing new at this time   Subjective:       Patient ID: Modesto Payton is a 48 y.o. male.    Chief Complaint: Pressure Ulcer ((Back/Right posterior thigh/Sacrum/Right lower leg /Right upper posterior arm/Left elbow/Left axilla/Left lower leg /Left posterior knee/Left upper chest /Left lower arm /Left medial knee/Left lateral foot /Right heel /Left heel / /)    December 14, 2023:  48-year-old black male, with a long history of quadriplegia, is here for follow up of his multiple pressure injuries.  His wounds have deteriorated somewhat, and there are some new wounds.  He denies fever, or nausea.  His wounds do not appear to be secondary infected.    November 3, 2023:  48-year-old black male, with quadriplegia, is here for follow up of his numerous pressure ulcers, totally 14.  All of his wounds were cleaned, measured, and silver nitrate was applied.  There are no new issues, other than his wounds are deepening somewhat.  He still has good family support.      September 22nd:  48-year-old black male, with quadriplegia, is here for follow up of his multiple pressure injuries.  He now has 14 pressure ulcers.  His family and home health takes care of him.  There some new ulcers today.  There is a recent skin tear on his right elbow.      August 25th:  48-year-old black male, a quadriplegia, is here for follow up of his multiple pressure ulcers.  He has not had significant deterioration.  He is still waiting for his low air loss mattress.  His last visit was 1 month ago.  He needs his pain medication refilled.  There are no obviously infected wounds at this time.      48-year-old black male, who is a  "quadriplegia, is here for follow up of his multiple pressure ulcers.  These ulcers have not significantly deteriorated, are for the most part slowly improving.  He still has not received his low air loss mattress.  There are no new complaints today.  He has home health services twice a week.        Review of Systems   Constitutional: Negative.    Respiratory: Negative.     Cardiovascular: Negative.    Skin:         As documented in the HPI.   All other systems reviewed and are negative.        Objective:      Vitals:    12/11/23 0740   BP: 99/62   BP Location: Right arm   Patient Position: Lying   Pulse: 88   Resp: 18   Weight: 74.8 kg (165 lb)   Height: 6' 2" (1.88 m)        Physical Exam  Vitals and nursing note reviewed. Exam conducted with a chaperone present.   Constitutional:       Appearance: Normal appearance.   Cardiovascular:      Rate and Rhythm: Normal rate.   Pulmonary:      Effort: Pulmonary effort is normal.   Skin:     General: Skin is warm and dry.      Comments: The wounds on his body have deteriorated somewhat.  There is no obvious foul odor or discharge or secondary infection.  I used silver nitrate to new chemical cauterization, and to treat the hypergranulation.  The wounds were immediately covered with Xeroform, by the staff.   Neurological:      Mental Status: He is alert.            Altered Skin Integrity 05/13/22 1237 Back #2 Other (comment) Full thickness tissue loss with exposed bone, tendon, or muscle. Often includes undermining and tunneling. May extend into muscle and/or supporting structures. (Active)   05/13/22 1237   Altered Skin Integrity Present on Admission - Did Patient arrive to the hospital with altered skin?: yes   Side:    Orientation:    Location: Back   Wound Number: #2   Is this injury device related?: No   Primary Wound Type: Other   Description of Altered Skin Integrity: Full thickness tissue loss with exposed bone, tendon, or muscle. Often includes undermining and " tunneling. May extend into muscle and/or supporting structures.   Ankle-Brachial Index:    Pulses:    Removal Indication and Assessment:    Wound Outcome:    (Retired) Wound Length (cm):    (Retired) Wound Width (cm):    (Retired) Depth (cm):    Wound Description (Comments):    Removal Indications:    Dressing Appearance Moist drainage 12/08/23 1140   Drainage Amount Moderate 12/08/23 1140   Drainage Characteristics/Odor Serosanguineous 12/08/23 1140   Appearance Red;Moist;Bleeding 12/08/23 1140   Tissue loss description Full thickness 12/08/23 1140   Periwound Area Dry;[place] 12/08/23 1140   Wound Edges Defined 12/08/23 1140   Wound Length (cm) 29.5 cm 12/08/23 1140   Wound Width (cm) 17.5 cm 12/08/23 1140   Wound Depth (cm) 0.2 cm 12/08/23 1140   Wound Volume (cm^3) 103.25 cm^3 12/08/23 1140   Wound Surface Area (cm^2) 516.25 cm^2 12/08/23 1140   Care Cleansed with:;Wound cleanser 12/08/23 1140   Dressing Applied 12/08/23 1140   Dressing Change Due 12/09/23 12/08/23 1140            Altered Skin Integrity 05/13/22 1247 Right posterior Thigh #4 Other (comment) Full thickness tissue loss. Subcutaneous fat may be visible but bone, tendon or muscle are not exposed (Active)   05/13/22 1247   Altered Skin Integrity Present on Admission - Did Patient arrive to the hospital with altered skin?: yes   Side: Right   Orientation: posterior   Location: Thigh   Wound Number: #4   Is this injury device related?: No   Primary Wound Type: Other   Description of Altered Skin Integrity: Full thickness tissue loss. Subcutaneous fat may be visible but bone, tendon or muscle are not exposed   Ankle-Brachial Index:    Pulses:    Removal Indication and Assessment:    Wound Outcome:    (Retired) Wound Length (cm):    (Retired) Wound Width (cm):    (Retired) Depth (cm):    Wound Description (Comments):    Removal Indications:    Dressing Appearance Moist drainage 12/08/23 1140   Drainage Amount Moderate 12/08/23 1140   Drainage  Characteristics/Odor Serosanguineous 12/08/23 1140   Appearance Red;Moist;Bleeding 12/08/23 1140   Tissue loss description Full thickness 12/08/23 1140   Periwound Area Dry;[place] 12/08/23 1140   Wound Edges Defined 12/08/23 1140   Wound Length (cm) 17.5 cm 12/08/23 1140   Wound Width (cm) 10 cm 12/08/23 1140   Wound Depth (cm) 0.2 cm 12/08/23 1140   Wound Volume (cm^3) 35 cm^3 12/08/23 1140   Wound Surface Area (cm^2) 175 cm^2 12/08/23 1140   Care Cleansed with:;Wound cleanser 12/08/23 1140   Dressing Applied 12/08/23 1140   Dressing Change Due 12/09/23 12/08/23 1140            Altered Skin Integrity 05/13/22 0107 Sacral spine #5 Other (comment) Full thickness tissue loss with exposed bone, tendon, or muscle. Often includes undermining and tunneling. May extend into muscle and/or supporting structures. (Active)   05/13/22 0107   Altered Skin Integrity Present on Admission - Did Patient arrive to the hospital with altered skin?: yes   Side:    Orientation:    Location: Sacral spine   Wound Number: #5   Is this injury device related?: No   Primary Wound Type: Other   Description of Altered Skin Integrity: Full thickness tissue loss with exposed bone, tendon, or muscle. Often includes undermining and tunneling. May extend into muscle and/or supporting structures.   Ankle-Brachial Index:    Pulses:    Removal Indication and Assessment:    Wound Outcome:    (Retired) Wound Length (cm):    (Retired) Wound Width (cm):    (Retired) Depth (cm):    Wound Description (Comments):    Removal Indications:    Dressing Appearance Moist drainage 12/08/23 1140   Drainage Amount Moderate 12/08/23 1140   Drainage Characteristics/Odor Serosanguineous 12/08/23 1140   Appearance Red;Moist;Bleeding 12/08/23 1140   Tissue loss description Full thickness 12/08/23 1140   Periwound Area Dry;[place] 12/08/23 1140   Wound Edges Defined 12/08/23 1140   Wound Length (cm) 13 cm 12/08/23 1140   Wound Width (cm) 14.5 cm 12/08/23 1140   Wound Depth (cm)  0.2 cm 12/08/23 1140   Wound Volume (cm^3) 37.7 cm^3 12/08/23 1140   Wound Surface Area (cm^2) 188.5 cm^2 12/08/23 1140   Care Cleansed with:;Wound cleanser 12/08/23 1140   Dressing Applied 12/08/23 1140   Dressing Change Due 12/09/23 12/08/23 1140            Altered Skin Integrity 03/31/23 1156 Right lower Leg #3 (Active)   03/31/23 1156   Altered Skin Integrity Present on Admission - Did Patient arrive to the hospital with altered skin?: yes   Side: Right   Orientation: lower   Location: Leg   Wound Number: #3   Is this injury device related?: No   Primary Wound Type:    Description of Altered Skin Integrity:    Ankle-Brachial Index:    Pulses:    Removal Indication and Assessment:    Wound Outcome:    (Retired) Wound Length (cm):    (Retired) Wound Width (cm):    (Retired) Depth (cm):    Wound Description (Comments):    Removal Indications:    Dressing Appearance Moist drainage 12/08/23 1140   Drainage Amount Moderate 12/08/23 1140   Drainage Characteristics/Odor Serosanguineous 12/08/23 1140   Appearance Red;Moist;Bleeding 12/08/23 1140   Tissue loss description Full thickness 12/08/23 1140   Periwound Area Dry;[place] 12/08/23 1140   Wound Edges Defined 12/08/23 1140   Wound Length (cm) 31 cm 12/08/23 1140   Wound Width (cm) 5 cm 12/08/23 1140   Wound Depth (cm) 0.2 cm 12/08/23 1140   Wound Volume (cm^3) 31 cm^3 12/08/23 1140   Wound Surface Area (cm^2) 155 cm^2 12/08/23 1140   Care Cleansed with:;Wound cleanser 12/08/23 1140   Dressing Applied 12/08/23 1140   Dressing Change Due 12/09/23 12/08/23 1140            Altered Skin Integrity 05/26/23 1146 Right upper;posterior Arm #7 (Active)   05/26/23 1146   Altered Skin Integrity Present on Admission - Did Patient arrive to the hospital with altered skin?: yes   Side: Right   Orientation: upper;posterior   Location: Arm   Wound Number: #7   Is this injury device related?: No   Primary Wound Type:    Description of Altered Skin Integrity:    Ankle-Brachial Index:     Pulses:    Removal Indication and Assessment:    Wound Outcome:    (Retired) Wound Length (cm):    (Retired) Wound Width (cm):    (Retired) Depth (cm):    Wound Description (Comments):    Removal Indications:    Dressing Appearance Moist drainage 12/08/23 1140   Drainage Amount Moderate 12/08/23 1140   Drainage Characteristics/Odor Serosanguineous 12/08/23 1140   Appearance Red;Moist;Bleeding 12/08/23 1140   Tissue loss description Full thickness 12/08/23 1140   Periwound Area Dry;[place] 12/08/23 1140   Wound Edges Defined 12/08/23 1140   Wound Length (cm) 13 cm 12/08/23 1140   Wound Width (cm) 3 cm 12/08/23 1140   Wound Depth (cm) 0.2 cm 12/08/23 1140   Wound Volume (cm^3) 7.8 cm^3 12/08/23 1140   Wound Surface Area (cm^2) 39 cm^2 12/08/23 1140   Care Cleansed with:;Wound cleanser 12/08/23 1140   Dressing Applied 12/08/23 1140   Dressing Change Due 12/09/23 12/08/23 1140            Altered Skin Integrity 05/26/23 1147 Left Elbow #8 (Active)   05/26/23 1147   Altered Skin Integrity Present on Admission - Did Patient arrive to the hospital with altered skin?: yes   Side: Left   Orientation:    Location: Elbow   Wound Number: #8   Is this injury device related?: No   Primary Wound Type:    Description of Altered Skin Integrity:    Ankle-Brachial Index:    Pulses:    Removal Indication and Assessment:    Wound Outcome:    (Retired) Wound Length (cm):    (Retired) Wound Width (cm):    (Retired) Depth (cm):    Wound Description (Comments):    Removal Indications:    Dressing Appearance Moist drainage 12/08/23 1140   Drainage Amount Moderate 12/08/23 1140   Drainage Characteristics/Odor Serosanguineous 12/08/23 1140   Appearance Red;Moist;Bleeding 12/08/23 1140   Tissue loss description Full thickness 12/08/23 1140   Periwound Area Dry;[place] 12/08/23 1140   Wound Edges Defined 12/08/23 1140   Wound Length (cm) 5 cm 12/08/23 1140   Wound Width (cm) 4 cm 12/08/23 1140   Wound Depth (cm) 0.2 cm 12/08/23 1140   Wound Volume  (cm^3) 4 cm^3 12/08/23 1140   Wound Surface Area (cm^2) 20 cm^2 12/08/23 1140   Care Cleansed with:;Wound cleanser 12/08/23 1140   Dressing Applied 12/08/23 1140   Dressing Change Due 12/09/23 12/08/23 1140            Altered Skin Integrity 05/26/23 1148 Left posterior Knee #11 (Active)   05/26/23 1148   Altered Skin Integrity Present on Admission - Did Patient arrive to the hospital with altered skin?: yes   Side: Left   Orientation: posterior   Location: Knee   Wound Number: #11   Is this injury device related?: No   Primary Wound Type:    Description of Altered Skin Integrity:    Ankle-Brachial Index:    Pulses:    Removal Indication and Assessment:    Wound Outcome:    (Retired) Wound Length (cm):    (Retired) Wound Width (cm):    (Retired) Depth (cm):    Wound Description (Comments):    Removal Indications:    Dressing Appearance Moist drainage 12/08/23 1140   Drainage Amount Moderate 12/08/23 1140   Drainage Characteristics/Odor Serosanguineous 12/08/23 1140   Appearance Red;Moist;Bleeding 12/08/23 1140   Tissue loss description Full thickness 12/08/23 1140   Periwound Area Dry;[place] 12/08/23 1140   Wound Edges Defined 12/08/23 1140   Wound Length (cm) 2.3 cm 12/08/23 1140   Wound Width (cm) 1.8 cm 12/08/23 1140   Wound Depth (cm) 0.2 cm 12/08/23 1140   Wound Volume (cm^3) 0.828 cm^3 12/08/23 1140   Wound Surface Area (cm^2) 4.14 cm^2 12/08/23 1140   Care Cleansed with:;Wound cleanser 12/08/23 1140   Dressing Applied 12/08/23 1140   Dressing Change Due 12/09/23 12/08/23 1140            Altered Skin Integrity 09/22/23 1251 Left upper Chest #12 (Active)   09/22/23 1251   Altered Skin Integrity Present on Admission - Did Patient arrive to the hospital with altered skin?: yes   Side: Left   Orientation: upper   Location: Chest   Wound Number: #12   Is this injury device related?: No   Primary Wound Type:    Description of Altered Skin Integrity:    Ankle-Brachial Index:    Pulses:    Removal Indication and  Assessment:    Wound Outcome:    (Retired) Wound Length (cm):    (Retired) Wound Width (cm):    (Retired) Depth (cm):    Wound Description (Comments):    Removal Indications:    Dressing Appearance Moist drainage 12/08/23 1140   Drainage Amount Moderate 12/08/23 1140   Drainage Characteristics/Odor Serosanguineous 12/08/23 1140   Appearance Red;Moist;Bleeding 12/08/23 1140   Tissue loss description Full thickness 12/08/23 1140   Periwound Area Dry;[place] 12/08/23 1140   Wound Edges Defined 12/08/23 1140   Wound Length (cm) 9.5 cm 12/08/23 1140   Wound Width (cm) 4 cm 12/08/23 1140   Wound Depth (cm) 0.2 cm 12/08/23 1140   Wound Volume (cm^3) 7.6 cm^3 12/08/23 1140   Wound Surface Area (cm^2) 38 cm^2 12/08/23 1140   Care Cleansed with:;Wound cleanser 12/08/23 1140   Dressing Applied 12/08/23 1140   Dressing Change Due 12/09/23 12/08/23 1140            Altered Skin Integrity 09/22/23 1252 Left lower Arm #13 (Active)   09/22/23 1252   Altered Skin Integrity Present on Admission - Did Patient arrive to the hospital with altered skin?: yes   Side: Left   Orientation: lower   Location: Arm   Wound Number: #13   Is this injury device related?: No   Primary Wound Type:    Description of Altered Skin Integrity:    Ankle-Brachial Index:    Pulses:    Removal Indication and Assessment:    Wound Outcome:    (Retired) Wound Length (cm):    (Retired) Wound Width (cm):    (Retired) Depth (cm):    Wound Description (Comments):    Removal Indications:    Dressing Appearance Moist drainage 12/08/23 1140   Drainage Amount Moderate 12/08/23 1140   Drainage Characteristics/Odor Serosanguineous 12/08/23 1140   Appearance Red;Moist;Bleeding 12/08/23 1140   Tissue loss description Full thickness 12/08/23 1140   Periwound Area Dry;[place] 12/08/23 1140   Wound Edges Defined 12/08/23 1140   Wound Length (cm) 10 cm 12/08/23 1140   Wound Width (cm) 3.5 cm 12/08/23 1140   Wound Depth (cm) 0.2 cm 12/08/23 1140   Wound Volume (cm^3) 7 cm^3 12/08/23  1140   Wound Surface Area (cm^2) 35 cm^2 12/08/23 1140   Care Cleansed with:;Wound cleanser 12/08/23 1140   Dressing Applied 12/08/23 1140   Dressing Change Due 12/09/23 12/08/23 1140            Altered Skin Integrity 09/22/23 1255 Left medial Knee #6 (Active)   09/22/23 1255   Altered Skin Integrity Present on Admission - Did Patient arrive to the hospital with altered skin?: yes   Side: Left   Orientation: medial   Location: Knee   Wound Number: #6   Is this injury device related?: No   Primary Wound Type:    Description of Altered Skin Integrity:    Ankle-Brachial Index:    Pulses:    Removal Indication and Assessment:    Wound Outcome:    (Retired) Wound Length (cm):    (Retired) Wound Width (cm):    (Retired) Depth (cm):    Wound Description (Comments):    Removal Indications:    Dressing Appearance Moist drainage 12/08/23 1140   Drainage Amount Moderate 12/08/23 1140   Drainage Characteristics/Odor Serosanguineous 12/08/23 1140   Appearance Red;Moist;Bleeding 12/08/23 1140   Tissue loss description Full thickness 12/08/23 1140   Periwound Area [employer];Dry 12/08/23 1140   Wound Edges Defined 12/08/23 1140   Wound Length (cm) 7 cm 12/08/23 1140   Wound Width (cm) 6.5 cm 12/08/23 1140   Wound Depth (cm) 0.2 cm 12/08/23 1140   Wound Volume (cm^3) 9.1 cm^3 12/08/23 1140   Wound Surface Area (cm^2) 45.5 cm^2 12/08/23 1140   Care Wound cleanser;Cleansed with: 12/08/23 1140   Dressing Applied 12/08/23 1140   Dressing Change Due 12/09/23 12/08/23 1140            Altered Skin Integrity 09/22/23 1256 Left lateral Foot #14 (Active)   09/22/23 1256   Altered Skin Integrity Present on Admission - Did Patient arrive to the hospital with altered skin?: yes   Side: Left   Orientation: lateral   Location: Foot   Wound Number: #14   Is this injury device related?: No   Primary Wound Type:    Description of Altered Skin Integrity:    Ankle-Brachial Index:    Pulses:    Removal Indication and Assessment:    Wound Outcome:     (Retired) Wound Length (cm):    (Retired) Wound Width (cm):    (Retired) Depth (cm):    Wound Description (Comments):    Removal Indications:    Dressing Appearance Moist drainage 12/08/23 1140   Drainage Amount Moderate 12/08/23 1140   Drainage Characteristics/Odor Serosanguineous 12/08/23 1140   Appearance Red;Moist;Bleeding 12/08/23 1140   Tissue loss description Full thickness 12/08/23 1140   Periwound Area Dry;[place] 12/08/23 1140   Wound Edges Defined 12/08/23 1140   Wound Length (cm) 0.8 cm 12/08/23 1140   Wound Width (cm) 0.5 cm 12/08/23 1140   Wound Depth (cm) 0.2 cm 12/08/23 1140   Wound Volume (cm^3) 0.08 cm^3 12/08/23 1140   Wound Surface Area (cm^2) 0.4 cm^2 12/08/23 1140   Care Cleansed with:;Wound cleanser 12/08/23 1140   Dressing Applied 12/08/23 1140   Dressing Change Due 12/09/23 12/08/23 1140            Altered Skin Integrity 11/03/23 1313 Right Heel (Active)   11/03/23 1313   Altered Skin Integrity Present on Admission - Did Patient arrive to the hospital with altered skin?: yes   Side: Right   Orientation:    Location: Heel   Wound Number:    Is this injury device related?: No   Primary Wound Type:    Description of Altered Skin Integrity:    Ankle-Brachial Index:    Pulses:    Removal Indication and Assessment:    Wound Outcome:    (Retired) Wound Length (cm):    (Retired) Wound Width (cm):    (Retired) Depth (cm):    Wound Description (Comments):    Removal Indications:    Dressing Appearance Moist drainage 12/08/23 1140   Drainage Amount Moderate 12/08/23 1140   Drainage Characteristics/Odor Serosanguineous 12/08/23 1140   Appearance Red;Moist;Bleeding 12/08/23 1140   Tissue loss description Full thickness 12/08/23 1140   Periwound Area Dry;[place] 12/08/23 1140   Wound Edges Defined 12/08/23 1140   Wound Length (cm) 1.5 cm 12/08/23 1140   Wound Width (cm) 3 cm 12/08/23 1140   Wound Depth (cm) 0.2 cm 12/08/23 1140   Wound Volume (cm^3) 0.9 cm^3 12/08/23 1140   Wound Surface Area (cm^2) 4.5 cm^2  12/08/23 1140   Care Cleansed with:;Wound cleanser 12/08/23 1140   Dressing Applied 12/08/23 1140   Dressing Change Due 12/09/23 12/08/23 1140            Altered Skin Integrity 11/03/23 1313 Left Heel (Active)   11/03/23 1313   Altered Skin Integrity Present on Admission - Did Patient arrive to the hospital with altered skin?: yes   Side: Left   Orientation:    Location: Heel   Wound Number:    Is this injury device related?: No   Primary Wound Type:    Description of Altered Skin Integrity:    Ankle-Brachial Index:    Pulses:    Removal Indication and Assessment:    Wound Outcome:    (Retired) Wound Length (cm):    (Retired) Wound Width (cm):    (Retired) Depth (cm):    Wound Description (Comments):    Removal Indications:    Dressing Appearance Moist drainage 12/08/23 1140   Drainage Amount Moderate 12/08/23 1140   Drainage Characteristics/Odor Serosanguineous 12/08/23 1140   Appearance Red;Moist;Bleeding 12/08/23 1140   Tissue loss description Full thickness 12/08/23 1140   Periwound Area Dry;[place] 12/08/23 1140   Wound Edges Defined 12/08/23 1140   Wound Length (cm) 3 cm 12/08/23 1140   Wound Width (cm) 2.5 cm 12/08/23 1140   Wound Depth (cm) 0.2 cm 12/08/23 1140   Wound Volume (cm^3) 1.5 cm^3 12/08/23 1140   Wound Surface Area (cm^2) 7.5 cm^2 12/08/23 1140   Care Cleansed with:;Wound cleanser 12/08/23 1140   Dressing Applied 12/08/23 1140   Dressing Change Due 12/09/23 12/08/23 1140            Altered Skin Integrity 12/08/23 1140 Left lower;lateral Leg (Active)   12/08/23 1140   Altered Skin Integrity Present on Admission - Did Patient arrive to the hospital with altered skin?: yes   Side: Left   Orientation: lower;lateral   Location: Leg   Wound Number:    Is this injury device related?:    Primary Wound Type:    Description of Altered Skin Integrity:    Ankle-Brachial Index:    Pulses:    Removal Indication and Assessment:    Wound Outcome:    (Retired) Wound Length (cm):    (Retired) Wound Width (cm):     (Retired) Depth (cm):    Wound Description (Comments):    Removal Indications:    Dressing Appearance Moist drainage 12/08/23 1140   Drainage Amount Moderate 12/08/23 1140   Drainage Characteristics/Odor Serosanguineous 12/08/23 1140   Appearance Red;Moist;Bleeding 12/08/23 1140   Tissue loss description Full thickness 12/08/23 1140   Periwound Area Dry;[place] 12/08/23 1140   Wound Edges Defined 12/08/23 1140   Wound Length (cm) 7.5 cm 12/08/23 1140   Wound Width (cm) 6 cm 12/08/23 1140   Wound Depth (cm) 0.2 cm 12/08/23 1140   Wound Volume (cm^3) 9 cm^3 12/08/23 1140   Wound Surface Area (cm^2) 45 cm^2 12/08/23 1140   Care Cleansed with:;Wound cleanser 12/08/23 1140   Dressing Applied 12/08/23 1140   Dressing Change Due 12/09/23 12/08/23 1140     11/3/2023                                                        Xeroform, ABD pad/gentle foam border dressing, tape   CT      Assessment:           ICD-10-CM ICD-9-CM   1. Pressure injury of right leg, stage 4  L89.894 707.09     707.24   2. Pressure injury of sacral region, stage 4  L89.154 707.03     707.24   3. Pressure injury of right upper back, stage 4  L89.114 707.02     707.24         Procedures:   Silver nitrate x 10    Excisional debridement performed:  [] Yes [x] No   Selective debridement performed:  [] Yes      No   Mechanical debridement performed:  [] Yes [x] No   Silver nitrate applied :   [x] Yes [] No   Labs ordered this visit:   [] Yes [] No   Imaging ordered this visit:   [] Yes [] No   Tissue pathology and/or culture taken:  [] Yes [] No         [facility]  [facility]     Since 2/6/2023     [phone]     ORDERS:  Back wound: Cleanse with wound cleanser, apply xeroform, ABD pad, and tape to be changed daily   Right lower leg wound: Cleanse with wound cleanser, apply xeroform, and gentle foam border dressing to be changed daily   Right posterior thigh wound: Cleanse with wound cleanser,  apply xeroform, ABD pad, and tape to be changed daily   Sacral wound: Cleanse with wound cleanser, apply xeroform, ABD pad, and tape to be changed daily    Right upper posterior arm wound: Cleanse with wound cleanser, apply xeroform, and gentle foam border dressing to be changed daily   Left elbow wound: Cleanse with wound cleanser, apply xeroform, silver alginate, and gentle foam border dressing to be changed daily   Left axilla wound: Cleanse with wound cleanser, apply xeroform, and gentle foam border dressing to be changed daily   Left posterior knee wound: Cleanse with wound cleanser, apply xeroform, and gentle foam border dressing to be changed daily   Left upper chest wound: Cleanse with wound cleanser, apply xeroform, and gentle foam border dressing to be changed daily  Left lower arm: Cleanse with wound cleanser, apply xeroform, and gentle foam border dressing to be changed daily  Left medial knee: Cleanse with wound cleanser, apply xeroform, and gentle foam border dressing to be changed daily  Left medial foot: Cleanse with wound cleanser, apply xeroform, and gentle foam border dressing to be changed daily      He will return here in 1 month.    Hydrocodone 10 mg, number 90, prescribed.

## 2023-12-15 NOTE — TELEPHONE ENCOUNTER
[facility] with [facility] called asking about the UA from 12/05/2023. They never received any recommendations and pt is still struggling with UTI symptoms. Sowmya states she did collect another urine sample last night 12/14/2023 and is completing another UA for evaluation and treatment. Please advise thanks

## 2023-12-15 NOTE — TELEPHONE ENCOUNTER
----- Message from Arnel Mallory sent at 12/15/2023  8:29 AM CST -----  .Type:  Needs Medical Advice    Who Called: [facility]  Symptoms (please be specific):    How long has patient had these symptoms:    Pharmacy name and phone #:    Would the patient rather a call back or a response via [facility]? Call back  Best Call Back Number: [phone]  Additional Information: calling to discuss UA

## 2024-01-01 ENCOUNTER — HOSPITAL ENCOUNTER (OUTPATIENT)
Dept: WOUND CARE | Facility: HOSPITAL | Age: 49
Discharge: HOME | End: 2024-02-02
Attending: FAMILY MEDICINE
Payer: MEDICARE

## 2024-01-01 ENCOUNTER — PATIENT OUTREACH (OUTPATIENT)
Dept: ADMINISTRATIVE | Facility: CLINIC | Age: 49
End: 2024-01-01
Payer: MEDICARE

## 2024-01-01 ENCOUNTER — DOCUMENTATION ONLY (OUTPATIENT)
Dept: FAMILY MEDICINE | Facility: CLINIC | Age: 49
End: 2024-01-01
Payer: MEDICARE

## 2024-01-01 ENCOUNTER — TELEPHONE (OUTPATIENT)
Dept: FAMILY MEDICINE | Facility: CLINIC | Age: 49
End: 2024-01-01
Payer: MEDICARE

## 2024-01-01 ENCOUNTER — TELEPHONE (OUTPATIENT)
Dept: FAMILY MEDICINE | Facility: CLINIC | Age: 49
End: 2024-01-01

## 2024-01-01 ENCOUNTER — HOSPITAL ENCOUNTER (EMERGENCY)
Facility: HOSPITAL | Age: 49
Discharge: HOSPICE | End: 2024-02-26
Attending: STUDENT IN AN ORGANIZED HEALTH CARE EDUCATION/TRAINING PROGRAM
Payer: MEDICARE

## 2024-01-01 VITALS
DIASTOLIC BLOOD PRESSURE: 79 MMHG | OXYGEN SATURATION: 100 % | SYSTOLIC BLOOD PRESSURE: 115 MMHG | RESPIRATION RATE: 18 BRPM | WEIGHT: 120 LBS | HEART RATE: 85 BPM | TEMPERATURE: 92 F | BODY MASS INDEX: 15.41 KG/M2

## 2024-01-01 VITALS — RESPIRATION RATE: 18 BRPM | WEIGHT: 165 LBS | HEIGHT: 74 IN | BODY MASS INDEX: 21.17 KG/M2

## 2024-01-01 DIAGNOSIS — L89.894 PRESSURE INJURY OF RIGHT LEG, STAGE 4: Primary | ICD-10-CM

## 2024-01-01 DIAGNOSIS — A49.9 BACTERIAL UTI: Primary | ICD-10-CM

## 2024-01-01 DIAGNOSIS — T14.8XXA OPEN WOUND OF SKIN: ICD-10-CM

## 2024-01-01 DIAGNOSIS — L89.114 PRESSURE INJURY OF RIGHT UPPER BACK, STAGE 4: ICD-10-CM

## 2024-01-01 DIAGNOSIS — L89.894 PRESSURE INJURY OF LEFT LEG, STAGE 4: ICD-10-CM

## 2024-01-01 DIAGNOSIS — J90 PLEURAL EFFUSION: ICD-10-CM

## 2024-01-01 DIAGNOSIS — R65.21 SEPTIC SHOCK: ICD-10-CM

## 2024-01-01 DIAGNOSIS — R00.0 TACHYCARDIA: ICD-10-CM

## 2024-01-01 DIAGNOSIS — L89.154 PRESSURE INJURY OF SACRAL REGION, STAGE 4: ICD-10-CM

## 2024-01-01 DIAGNOSIS — A41.9 SEPSIS, DUE TO UNSPECIFIED ORGANISM, UNSPECIFIED WHETHER ACUTE ORGAN DYSFUNCTION PRESENT: Primary | ICD-10-CM

## 2024-01-01 DIAGNOSIS — R05.3 CHRONIC COUGH: ICD-10-CM

## 2024-01-01 DIAGNOSIS — S51.001D OPEN WOUND OF RIGHT ELBOW, SUBSEQUENT ENCOUNTER: ICD-10-CM

## 2024-01-01 DIAGNOSIS — R39.9 UTI SYMPTOMS: Primary | ICD-10-CM

## 2024-01-01 DIAGNOSIS — A41.9 SEPTIC SHOCK: ICD-10-CM

## 2024-01-01 DIAGNOSIS — N39.0 BACTERIAL UTI: Primary | ICD-10-CM

## 2024-01-01 LAB
ALBUMIN SERPL-MCNC: 1.4 G/DL (ref 3.5–5)
ALBUMIN/GLOB SERPL: 0.4 RATIO (ref 1.1–2)
ALP SERPL-CCNC: 133 UNIT/L (ref 40–150)
ALT SERPL-CCNC: 10 UNIT/L (ref 0–55)
AMPHET UR QL SCN: NEGATIVE
APPEARANCE UR: ABNORMAL
AST SERPL-CCNC: 19 UNIT/L (ref 5–34)
BACTERIA #/AREA URNS AUTO: ABNORMAL /HPF
BARBITURATE SCN PRESENT UR: NEGATIVE
BENZODIAZ UR QL SCN: POSITIVE
BILIRUB SERPL-MCNC: 0.2 MG/DL
BILIRUB UR QL STRIP.AUTO: NEGATIVE
BUN SERPL-MCNC: 6.6 MG/DL (ref 8.9–20.6)
CALCIUM SERPL-MCNC: 7.7 MG/DL (ref 8.4–10.2)
CANNABINOIDS UR QL SCN: NEGATIVE
CAOX CRY URNS QL MICRO: ABNORMAL /HPF
CHLORIDE SERPL-SCNC: 104 MMOL/L (ref 98–107)
CO2 SERPL-SCNC: 28 MMOL/L (ref 22–29)
COCAINE UR QL SCN: NEGATIVE
COLOR UR AUTO: YELLOW
CREAT SERPL-MCNC: 0.33 MG/DL (ref 0.73–1.18)
FENTANYL UR QL SCN: NEGATIVE
GFR SERPLBLD CREATININE-BSD FMLA CKD-EPI: >60 MLS/MIN/1.73/M2
GLOBULIN SER-MCNC: 3.9 GM/DL (ref 2.4–3.5)
GLUCOSE SERPL-MCNC: 96 MG/DL (ref 74–100)
GLUCOSE UR QL STRIP.AUTO: NORMAL
KETONES UR QL STRIP.AUTO: NEGATIVE
LACTATE SERPL-SCNC: 1.3 MMOL/L (ref 0.5–2.2)
LEUKOCYTE ESTERASE UR QL STRIP.AUTO: 500
MAGNESIUM SERPL-MCNC: 2 MG/DL (ref 1.6–2.6)
MDMA UR QL SCN: NEGATIVE
MUCOUS THREADS URNS QL MICRO: ABNORMAL /LPF
NITRITE UR QL STRIP.AUTO: NEGATIVE
NON-SQ EPI CELLS URNS QL MICRO: ABNORMAL /HPF
OHS QRS DURATION: 112 MS
OHS QTC CALCULATION: 487 MS
OPIATES UR QL SCN: POSITIVE
PCP UR QL: NEGATIVE
PH UR STRIP.AUTO: 6 [PH]
PH UR: 6 [PH] (ref 3–11)
POTASSIUM SERPL-SCNC: 4.5 MMOL/L (ref 3.5–5.1)
PROT SERPL-MCNC: 5.3 GM/DL (ref 6.4–8.3)
PROT UR QL STRIP.AUTO: ABNORMAL
RBC #/AREA URNS AUTO: ABNORMAL /HPF
RBC UR QL AUTO: ABNORMAL
SODIUM SERPL-SCNC: 135 MMOL/L (ref 136–145)
SP GR UR STRIP.AUTO: 1.02 (ref 1–1.03)
SPECIFIC GRAVITY, URINE AUTO (.000) (OHS): 1.02 (ref 1–1.03)
SQUAMOUS #/AREA URNS LPF: ABNORMAL /HPF
UROBILINOGEN UR STRIP-ACNC: NORMAL
WBC #/AREA URNS AUTO: >100 /HPF
YEAST BUDDING URNS QL: ABNORMAL /HPF

## 2024-01-01 PROCEDURE — 83735 ASSAY OF MAGNESIUM: CPT | Performed by: STUDENT IN AN ORGANIZED HEALTH CARE EDUCATION/TRAINING PROGRAM

## 2024-01-01 PROCEDURE — 87086 URINE CULTURE/COLONY COUNT: CPT | Performed by: STUDENT IN AN ORGANIZED HEALTH CARE EDUCATION/TRAINING PROGRAM

## 2024-01-01 PROCEDURE — 80053 COMPREHEN METABOLIC PANEL: CPT | Performed by: STUDENT IN AN ORGANIZED HEALTH CARE EDUCATION/TRAINING PROGRAM

## 2024-01-01 PROCEDURE — 17250 CHEM CAUT OF GRANLTJ TISSUE: CPT

## 2024-01-01 PROCEDURE — 96365 THER/PROPH/DIAG IV INF INIT: CPT

## 2024-01-01 PROCEDURE — 96376 TX/PRO/DX INJ SAME DRUG ADON: CPT

## 2024-01-01 PROCEDURE — 99285 EMERGENCY DEPT VISIT HI MDM: CPT | Mod: 25

## 2024-01-01 PROCEDURE — 63600175 PHARM REV CODE 636 W HCPCS: Mod: JZ,JG | Performed by: INTERNAL MEDICINE

## 2024-01-01 PROCEDURE — 27000999 HC MEDICAL RECORD PHOTO DOCUMENTATION

## 2024-01-01 PROCEDURE — 99213 OFFICE O/P EST LOW 20 MIN: CPT

## 2024-01-01 PROCEDURE — 63600175 PHARM REV CODE 636 W HCPCS: Performed by: STUDENT IN AN ORGANIZED HEALTH CARE EDUCATION/TRAINING PROGRAM

## 2024-01-01 PROCEDURE — 83605 ASSAY OF LACTIC ACID: CPT | Performed by: STUDENT IN AN ORGANIZED HEALTH CARE EDUCATION/TRAINING PROGRAM

## 2024-01-01 PROCEDURE — 87040 BLOOD CULTURE FOR BACTERIA: CPT | Performed by: STUDENT IN AN ORGANIZED HEALTH CARE EDUCATION/TRAINING PROGRAM

## 2024-01-01 PROCEDURE — 96375 TX/PRO/DX INJ NEW DRUG ADDON: CPT

## 2024-01-01 PROCEDURE — 81001 URINALYSIS AUTO W/SCOPE: CPT | Performed by: STUDENT IN AN ORGANIZED HEALTH CARE EDUCATION/TRAINING PROGRAM

## 2024-01-01 PROCEDURE — 93005 ELECTROCARDIOGRAM TRACING: CPT

## 2024-01-01 PROCEDURE — 25000003 PHARM REV CODE 250: Performed by: STUDENT IN AN ORGANIZED HEALTH CARE EDUCATION/TRAINING PROGRAM

## 2024-01-01 PROCEDURE — 96367 TX/PROPH/DG ADDL SEQ IV INF: CPT

## 2024-01-01 PROCEDURE — 80307 DRUG TEST PRSMV CHEM ANLYZR: CPT | Performed by: STUDENT IN AN ORGANIZED HEALTH CARE EDUCATION/TRAINING PROGRAM

## 2024-01-01 PROCEDURE — 93010 ELECTROCARDIOGRAM REPORT: CPT | Mod: ,,, | Performed by: INTERNAL MEDICINE

## 2024-01-01 RX ORDER — VANCOMYCIN HCL IN 5 % DEXTROSE 1G/250ML
1000 PLASTIC BAG, INJECTION (ML) INTRAVENOUS ONCE
Status: COMPLETED | OUTPATIENT
Start: 2024-01-01 | End: 2024-01-01

## 2024-01-01 RX ORDER — FERROUS SULFATE 325(65) MG
325 TABLET, DELAYED RELEASE (ENTERIC COATED) ORAL
COMMUNITY

## 2024-01-01 RX ORDER — MIDODRINE HYDROCHLORIDE 10 MG/1
10 TABLET ORAL
COMMUNITY
Start: 2024-01-01 | End: 2024-03-19

## 2024-01-01 RX ORDER — CEFDINIR 300 MG/1
300 CAPSULE ORAL
COMMUNITY
Start: 2024-01-01 | End: 2024-01-01

## 2024-01-01 RX ORDER — DIPHENHYDRAMINE HYDROCHLORIDE 50 MG/ML
25 INJECTION INTRAMUSCULAR; INTRAVENOUS
Status: COMPLETED | OUTPATIENT
Start: 2024-01-01 | End: 2024-01-01

## 2024-01-01 RX ORDER — METRONIDAZOLE 500 MG/100ML
500 INJECTION, SOLUTION INTRAVENOUS
Status: COMPLETED | OUTPATIENT
Start: 2024-01-01 | End: 2024-01-01

## 2024-01-01 RX ORDER — CIPROFLOXACIN 500 MG/1
500 TABLET ORAL EVERY 12 HOURS
Qty: 14 TABLET | Refills: 0 | Status: SHIPPED | OUTPATIENT
Start: 2024-01-01 | End: 2024-01-01

## 2024-01-01 RX ORDER — MORPHINE SULFATE 4 MG/ML
2 INJECTION, SOLUTION INTRAMUSCULAR; INTRAVENOUS
Status: DISCONTINUED | OUTPATIENT
Start: 2024-01-01 | End: 2024-01-01 | Stop reason: HOSPADM

## 2024-01-01 RX ORDER — NITROFURANTOIN 25; 75 MG/1; MG/1
100 CAPSULE ORAL EVERY 12 HOURS
Qty: 14 CAPSULE | Refills: 0 | Status: SHIPPED | OUTPATIENT
Start: 2024-01-01 | End: 2024-01-01

## 2024-01-01 RX ORDER — MORPHINE SULFATE 4 MG/ML
2 INJECTION, SOLUTION INTRAMUSCULAR; INTRAVENOUS
Status: DISCONTINUED | OUTPATIENT
Start: 2024-01-01 | End: 2024-01-01

## 2024-01-01 RX ORDER — PROMETHAZINE HYDROCHLORIDE 6.25 MG/5ML
SYRUP ORAL
Qty: 473 ML | Refills: 0 | Status: SHIPPED | OUTPATIENT
Start: 2024-01-01

## 2024-01-01 RX ORDER — HYDROCODONE BITARTRATE AND ACETAMINOPHEN 10; 325 MG/1; MG/1
1 TABLET ORAL EVERY 8 HOURS PRN
Qty: 90 TABLET | Refills: 0 | Status: SHIPPED | OUTPATIENT
Start: 2024-01-01

## 2024-01-01 RX ADMIN — MORPHINE SULFATE 2 MG: 4 INJECTION, SOLUTION INTRAMUSCULAR; INTRAVENOUS at 06:02

## 2024-01-01 RX ADMIN — METRONIDAZOLE 500 MG: 5 INJECTION, SOLUTION INTRAVENOUS at 11:02

## 2024-01-01 RX ADMIN — DIPHENHYDRAMINE HYDROCHLORIDE 25 MG: 50 INJECTION INTRAMUSCULAR; INTRAVENOUS at 11:02

## 2024-01-01 RX ADMIN — CEFEPIME 1 G: 1 INJECTION, POWDER, FOR SOLUTION INTRAMUSCULAR; INTRAVENOUS at 11:02

## 2024-01-01 RX ADMIN — SODIUM CHLORIDE, POTASSIUM CHLORIDE, SODIUM LACTATE AND CALCIUM CHLORIDE 1632 ML: 600; 310; 30; 20 INJECTION, SOLUTION INTRAVENOUS at 12:02

## 2024-01-01 RX ADMIN — MORPHINE SULFATE 2 MG: 4 INJECTION, SOLUTION INTRAMUSCULAR; INTRAVENOUS at 03:02

## 2024-01-01 RX ADMIN — MORPHINE SULFATE 2 MG: 4 INJECTION, SOLUTION INTRAMUSCULAR; INTRAVENOUS at 04:02

## 2024-01-01 RX ADMIN — VANCOMYCIN HYDROCHLORIDE 1000 MG: 1 INJECTION, POWDER, LYOPHILIZED, FOR SOLUTION INTRAVENOUS at 11:02

## 2024-01-30 NOTE — TELEPHONE ENCOUNTER
----- Message from Jack Wilson sent at 1/30/2024  2:04 PM CST -----  .Type:  Needs Medical Advice    Who Called: [facility]   Symptoms (please be specific):    How long has patient had these symptoms:    Pharmacy name and phone #:    Would the patient rather a call back or a response via [facility]?   Best Call Back Number: [phone]  Additional Information: Requested to speak with the nurse about this patient. Requested an order for a UA.

## 2024-01-31 NOTE — TELEPHONE ENCOUNTER
----- Message from Quita Boogie MD sent at 1/31/2024 12:52 PM CST -----  Urinalysis is suggestive of bacterial urinary tract infection, Rx Cipro sent to take as directed while awaiting results of pending final urine culture.

## 2024-02-02 NOTE — PROGRESS NOTES
Home Health: [facility]   Smoker   [] Yes  [x] No  Diabetic   [] Yes   [x] No   Low air loss mattress [] Yes [x] No (family is trying to get,has been ordered)    Is the patient eligible for a graft, and/or currently grafting?  [] Yes [x] No       NOTES:  Unable to take pictures today due to EPIC not working properly. Nothing new at this time   Subjective:       Patient ID: Modesto Payton is a 48 y.o. male.    Chief Complaint: Pressure Ulcer ((Back/Right posterior thigh/Sacrum/Right lower leg /Right upper posterior arm/Left elbow/Left axilla/Left lower leg /Left posterior knee/Left upper chest /Left lower arm /Left medial knee/Left lateral foot (HEALED) /Right heel /Left heel /Left lower medial leg (NEW))    February 5, 2024:  48-year-old black male, with quadriplegia, is here for follow up of multiple stage IV pressure ulcers, anterior and posterior.  Most of his wounds have stalled, but there are a few new wounds, and some of the old wounds are deteriorated.  His prognosis at this point is guarded.  There is also some palpable bone on a few of his ulcers.    December 14, 2023:  48-year-old black male, with a long history of quadriplegia, is here for follow up of his multiple pressure injuries.  His wounds have deteriorated somewhat, and there are some new wounds.  He denies fever, or nausea.  His wounds do not appear to be secondary infected.    November 3, 2023:  48-year-old black male, with quadriplegia, is here for follow up of his numerous pressure ulcers, totally 14.  All of his wounds were cleaned, measured, and silver nitrate was applied.  There are no new issues, other than his wounds are deepening somewhat.  He still has good family support.      September 22nd:  48-year-old black male, with quadriplegia, is here for follow up of his multiple pressure injuries.  He now has 14 pressure ulcers.  His family and home health takes care of him.  There some new ulcers today.  There is a recent skin tear on his  "right elbow.      August 25th:  48-year-old black male, a quadriplegia, is here for follow up of his multiple pressure ulcers.  He has not had significant deterioration.  He is still waiting for his low air loss mattress.  His last visit was 1 month ago.  He needs his pain medication refilled.  There are no obviously infected wounds at this time.      48-year-old black male, who is a quadriplegia, is here for follow up of his multiple pressure ulcers.  These ulcers have not significantly deteriorated, are for the most part slowly improving.  He still has not received his low air loss mattress.  There are no new complaints today.  He has home health services twice a week.        Review of Systems   Constitutional: Negative.    Respiratory: Negative.     Cardiovascular: Negative.    Skin:         As documented in the HPI.   All other systems reviewed and are negative.        Objective:      Vitals:    02/02/24 1141   BP: (!) (P) 88/47   Pulse: (!) (P) 136   Resp: 18   Weight: 74.8 kg (165 lb)   Height: 6' 2" (1.88 m)        Physical Exam  Vitals and nursing note reviewed. Exam conducted with a chaperone present.   Constitutional:       Appearance: Normal appearance.   Cardiovascular:      Rate and Rhythm: Normal rate.   Pulmonary:      Effort: Pulmonary effort is normal.   Skin:     General: Skin is warm and dry.      Comments: There are multiple pressure injuries, stage IV, anterior and posterior.  Most of these wounds had mechanical debridement done.  I used silver nitrate on all of these wounds, for hypergranulation.  He tolerated this well.  His prognosis is very poor at this point.  There is also some palpable bone on a few of his ulcers.   Neurological:      Mental Status: He is alert.            Altered Skin Integrity 05/13/22 1237 Back #2 Other (comment) Full thickness tissue loss with exposed bone, tendon, or muscle. Often includes undermining and tunneling. May extend into muscle and/or supporting " structures. (Active)   05/13/22 1237   Altered Skin Integrity Present on Admission - Did Patient arrive to the hospital with altered skin?: yes   Side:    Orientation:    Location: Back   Wound Number: #2   Is this injury device related?: No   Primary Wound Type: Other   Description of Altered Skin Integrity: Full thickness tissue loss with exposed bone, tendon, or muscle. Often includes undermining and tunneling. May extend into muscle and/or supporting structures.   Ankle-Brachial Index:    Pulses:    Removal Indication and Assessment:    Wound Outcome:    (Retired) Wound Length (cm):    (Retired) Wound Width (cm):    (Retired) Depth (cm):    Wound Description (Comments):    Removal Indications:    Description of Altered Skin Integrity Full thickness tissue loss with exposed bone, tendon, or muscle. Often includes undermining and tunneling. May extend into muscle and/or supporting structures. 02/02/24 1259   Dressing Appearance Intact;Moist drainage 02/02/24 1259   Drainage Amount Moderate 02/02/24 1259   Drainage Characteristics/Odor Serosanguineous 02/02/24 1259   Appearance Intact;Red;Moist;Bleeding;Bone 02/02/24 1259   Tissue loss description Full thickness 02/02/24 1259   Periwound Area Intact 02/02/24 1259   Wound Edges Open 02/02/24 1259   Wound Length (cm) 28 cm 02/02/24 1259   Wound Width (cm) 31 cm 02/02/24 1259   Wound Depth (cm) 0.2 cm 02/02/24 1259   Wound Volume (cm^3) 173.6 cm^3 02/02/24 1259   Wound Surface Area (cm^2) 868 cm^2 02/02/24 1259   Care Cleansed with:;Wound cleanser;Debrided 02/02/24 1259   Dressing Applied;Other (comment) 02/02/24 1259   Dressing Change Due 02/03/24 02/02/24 1259            Altered Skin Integrity 05/13/22 1247 Right posterior Thigh #4 Other (comment) Full thickness tissue loss. Subcutaneous fat may be visible but bone, tendon or muscle are not exposed (Active)   05/13/22 1247   Altered Skin Integrity Present on Admission - Did Patient arrive to the hospital with  altered skin?: yes   Side: Right   Orientation: posterior   Location: Thigh   Wound Number: #4   Is this injury device related?: No   Primary Wound Type: Other   Description of Altered Skin Integrity: Full thickness tissue loss. Subcutaneous fat may be visible but bone, tendon or muscle are not exposed   Ankle-Brachial Index:    Pulses:    Removal Indication and Assessment:    Wound Outcome:    (Retired) Wound Length (cm):    (Retired) Wound Width (cm):    (Retired) Depth (cm):    Wound Description (Comments):    Removal Indications:    Description of Altered Skin Integrity Full thickness tissue loss. Subcutaneous fat may be visible but bone, tendon or muscle are not exposed 02/02/24 1259   Dressing Appearance Intact;Moist drainage 02/02/24 1259   Drainage Amount Moderate 02/02/24 1259   Drainage Characteristics/Odor Serosanguineous 02/02/24 1259   Appearance Intact;Red;Slough;Moist;Epithelialization;Granulating;Bleeding 02/02/24 1259   Tissue loss description Full thickness 02/02/24 1259   Periwound Area Intact;Moist 02/02/24 1259   Wound Edges Open 02/02/24 1259   Wound Length (cm) 17 cm 02/02/24 1259   Wound Width (cm) 9 cm 02/02/24 1259   Wound Depth (cm) 0.2 cm 02/02/24 1259   Wound Volume (cm^3) 30.6 cm^3 02/02/24 1259   Wound Surface Area (cm^2) 153 cm^2 02/02/24 1259   Care Cleansed with:;Wound cleanser;Debrided 02/02/24 1259   Dressing Applied;Other (comment) 02/02/24 1259   Dressing Change Due 02/03/24 02/02/24 1259            Altered Skin Integrity 05/13/22 0107 Sacral spine #5 Other (comment) Full thickness tissue loss with exposed bone, tendon, or muscle. Often includes undermining and tunneling. May extend into muscle and/or supporting structures. (Active)   05/13/22 0107   Altered Skin Integrity Present on Admission - Did Patient arrive to the hospital with altered skin?: yes   Side:    Orientation:    Location: Sacral spine   Wound Number: #5   Is this injury device related?: No   Primary Wound Type:  Other   Description of Altered Skin Integrity: Full thickness tissue loss with exposed bone, tendon, or muscle. Often includes undermining and tunneling. May extend into muscle and/or supporting structures.   Ankle-Brachial Index:    Pulses:    Removal Indication and Assessment:    Wound Outcome:    (Retired) Wound Length (cm):    (Retired) Wound Width (cm):    (Retired) Depth (cm):    Wound Description (Comments):    Removal Indications:    Description of Altered Skin Integrity Full thickness tissue loss. Subcutaneous fat may be visible but bone, tendon or muscle are not exposed 02/02/24 1259   Dressing Appearance Intact;Moist drainage 02/02/24 1259   Drainage Amount Moderate 02/02/24 1259   Drainage Characteristics/Odor Serosanguineous 02/02/24 1259   Appearance Intact;Slough;Moist;Bleeding;Granulating;Epithelialization 02/02/24 1259   Tissue loss description Full thickness 02/02/24 1259   Periwound Area Intact 02/02/24 1259   Wound Edges Open 02/02/24 1259   Wound Length (cm) 11.5 cm 02/02/24 1259   Wound Width (cm) 15 cm 02/02/24 1259   Wound Depth (cm) 0.2 cm 02/02/24 1259   Wound Volume (cm^3) 34.5 cm^3 02/02/24 1259   Wound Surface Area (cm^2) 172.5 cm^2 02/02/24 1259   Care Cleansed with:;Wound cleanser 02/02/24 1259   Dressing Applied;Other (comment) 02/02/24 1259   Dressing Change Due 02/03/24 02/02/24 1259            Altered Skin Integrity 03/31/23 1156 Right lower Leg #3 (Active)   03/31/23 1156   Altered Skin Integrity Present on Admission - Did Patient arrive to the hospital with altered skin?: yes   Side: Right   Orientation: lower   Location: Leg   Wound Number: #3   Is this injury device related?: No   Primary Wound Type:    Description of Altered Skin Integrity:    Ankle-Brachial Index:    Pulses:    Removal Indication and Assessment:    Wound Outcome:    (Retired) Wound Length (cm):    (Retired) Wound Width (cm):    (Retired) Depth (cm):    Wound Description (Comments):    Removal Indications:     Description of Altered Skin Integrity Full thickness tissue loss. Subcutaneous fat may be visible but bone, tendon or muscle are not exposed 02/02/24 1259   Dressing Appearance Intact;Moist drainage 02/02/24 1259   Drainage Amount Moderate 02/02/24 1259   Drainage Characteristics/Odor Serosanguineous 02/02/24 1259   Appearance Intact;Slough;Moist;Granulating;Epithelialization;Bleeding 02/02/24 1259   Tissue loss description Full thickness 02/02/24 1259   Periwound Area Intact 02/02/24 1259   Wound Edges Open 02/02/24 1259   Wound Length (cm) 4.5 cm 02/02/24 1259   Wound Width (cm) 3.5 cm 02/02/24 1259   Wound Depth (cm) 0.5 cm 02/02/24 1259   Wound Volume (cm^3) 7.875 cm^3 02/02/24 1259   Wound Surface Area (cm^2) 15.75 cm^2 02/02/24 1259   Care Cleansed with:;Wound cleanser 02/02/24 1259   Dressing Applied;Other (comment) 02/02/24 1259   Dressing Change Due 02/03/24 02/02/24 1259            Altered Skin Integrity 05/26/23 1146 Right upper;posterior Arm #7 (Active)   05/26/23 1146   Altered Skin Integrity Present on Admission - Did Patient arrive to the hospital with altered skin?: yes   Side: Right   Orientation: upper;posterior   Location: Arm   Wound Number: #7   Is this injury device related?: No   Primary Wound Type:    Description of Altered Skin Integrity:    Ankle-Brachial Index:    Pulses:    Removal Indication and Assessment:    Wound Outcome:    (Retired) Wound Length (cm):    (Retired) Wound Width (cm):    (Retired) Depth (cm):    Wound Description (Comments):    Removal Indications:    Description of Altered Skin Integrity Full thickness tissue loss with exposed bone, tendon, or muscle. Often includes undermining and tunneling. May extend into muscle and/or supporting structures. 02/02/24 1259   Dressing Appearance Moist drainage;Intact 02/02/24 1259   Drainage Amount Large 02/02/24 1259   Drainage Characteristics/Odor Serosanguineous 02/02/24 1259   Appearance Intact;Moist;Bone;Tendon;Adipose  02/02/24 1259   Tissue loss description Full thickness 02/02/24 1259   Periwound Area Intact 02/02/24 1259   Wound Edges Open 02/02/24 1259   Wound Length (cm) 16 cm 02/02/24 1259   Wound Width (cm) 7 cm 02/02/24 1259   Wound Depth (cm) 0.5 cm 02/02/24 1259   Wound Volume (cm^3) 56 cm^3 02/02/24 1259   Wound Surface Area (cm^2) 112 cm^2 02/02/24 1259   Care Cleansed with:;Wound cleanser 02/02/24 1259   Dressing Applied;Other (comment) 02/02/24 1259   Dressing Change Due 02/03/24 02/02/24 1259            Altered Skin Integrity 05/26/23 1147 Left Elbow #8 (Active)   05/26/23 1147   Altered Skin Integrity Present on Admission - Did Patient arrive to the hospital with altered skin?: yes   Side: Left   Orientation:    Location: Elbow   Wound Number: #8   Is this injury device related?: No   Primary Wound Type:    Description of Altered Skin Integrity:    Ankle-Brachial Index:    Pulses:    Removal Indication and Assessment:    Wound Outcome:    (Retired) Wound Length (cm):    (Retired) Wound Width (cm):    (Retired) Depth (cm):    Wound Description (Comments):    Removal Indications:    Description of Altered Skin Integrity Full thickness tissue loss. Subcutaneous fat may be visible but bone, tendon or muscle are not exposed 02/02/24 1259   Dressing Appearance Intact 02/02/24 1259   Drainage Amount Moderate 02/02/24 1259   Drainage Characteristics/Odor Serosanguineous 02/02/24 1259   Appearance Intact;Moist 02/02/24 1259   Tissue loss description Full thickness 02/02/24 1259   Periwound Area Intact 02/02/24 1259   Wound Edges Open 02/02/24 1259   Wound Length (cm) 6 cm 02/02/24 1259   Wound Width (cm) 4.5 cm 02/02/24 1259   Wound Depth (cm) 0.2 cm 02/02/24 1259   Wound Volume (cm^3) 5.4 cm^3 02/02/24 1259   Wound Surface Area (cm^2) 27 cm^2 02/02/24 1259   Care Cleansed with:;Wound cleanser 02/02/24 1259   Dressing Applied;Other (comment) 02/02/24 1259   Dressing Change Due 02/03/24 02/02/24 1259            Altered Skin  Integrity 05/26/23 1148 Left posterior Knee #11 (Active)   05/26/23 1148   Altered Skin Integrity Present on Admission - Did Patient arrive to the hospital with altered skin?: yes   Side: Left   Orientation: posterior   Location: Knee   Wound Number: #11   Is this injury device related?: No   Primary Wound Type:    Description of Altered Skin Integrity:    Ankle-Brachial Index:    Pulses:    Removal Indication and Assessment:    Wound Outcome:    (Retired) Wound Length (cm):    (Retired) Wound Width (cm):    (Retired) Depth (cm):    Wound Description (Comments):    Removal Indications:    Description of Altered Skin Integrity Full thickness tissue loss. Subcutaneous fat may be visible but bone, tendon or muscle are not exposed 02/02/24 1259   Dressing Appearance Intact;Moist drainage 02/02/24 1259   Drainage Amount Moderate 02/02/24 1259   Drainage Characteristics/Odor Serosanguineous 02/02/24 1259   Appearance Intact;Moist;Bleeding 02/02/24 1259   Tissue loss description Full thickness 02/02/24 1259   Periwound Area Intact 02/02/24 1259   Wound Edges Open 02/02/24 1259   Wound Length (cm) 2 cm 02/02/24 1259   Wound Width (cm) 1.5 cm 02/02/24 1259   Wound Depth (cm) 0.2 cm 02/02/24 1259   Wound Volume (cm^3) 0.6 cm^3 02/02/24 1259   Wound Surface Area (cm^2) 3 cm^2 02/02/24 1259   Care Cleansed with:;Wound cleanser 02/02/24 1259   Dressing Applied;Other (comment) 02/02/24 1259   Dressing Change Due 02/03/24 02/02/24 1259            Altered Skin Integrity 09/22/23 1251 Left upper Chest #12 (Active)   09/22/23 1251   Altered Skin Integrity Present on Admission - Did Patient arrive to the hospital with altered skin?: yes   Side: Left   Orientation: upper   Location: Chest   Wound Number: #12   Is this injury device related?: No   Primary Wound Type:    Description of Altered Skin Integrity:    Ankle-Brachial Index:    Pulses:    Removal Indication and Assessment:    Wound Outcome:    (Retired) Wound Length (cm):     (Retired) Wound Width (cm):    (Retired) Depth (cm):    Wound Description (Comments):    Removal Indications:    Description of Altered Skin Integrity Full thickness tissue loss. Subcutaneous fat may be visible but bone, tendon or muscle are not exposed 02/02/24 1259   Dressing Appearance Intact;Moist drainage 02/02/24 1259   Drainage Amount Moderate 02/02/24 1259   Drainage Characteristics/Odor Serosanguineous 02/02/24 1259   Appearance Intact;Moist;Epithelialization;Granulating 02/02/24 1259   Tissue loss description Full thickness 02/02/24 1259   Periwound Area Intact;Moist 02/02/24 1259   Wound Edges Open 02/02/24 1259   Wound Length (cm) 13 cm 02/02/24 1259   Wound Width (cm) 3.3 cm 02/02/24 1259   Wound Depth (cm) 0.2 cm 02/02/24 1259   Wound Volume (cm^3) 8.58 cm^3 02/02/24 1259   Wound Surface Area (cm^2) 42.9 cm^2 02/02/24 1259   Care Cleansed with:;Wound cleanser 02/02/24 1259   Dressing Applied;Other (comment) 02/02/24 1259   Dressing Change Due 02/03/24 02/02/24 1259            Altered Skin Integrity 09/22/23 1252 Left lower Arm #13 (Active)   09/22/23 1252   Altered Skin Integrity Present on Admission - Did Patient arrive to the hospital with altered skin?: yes   Side: Left   Orientation: lower   Location: Arm   Wound Number: #13   Is this injury device related?: No   Primary Wound Type:    Description of Altered Skin Integrity:    Ankle-Brachial Index:    Pulses:    Removal Indication and Assessment:    Wound Outcome:    (Retired) Wound Length (cm):    (Retired) Wound Width (cm):    (Retired) Depth (cm):    Wound Description (Comments):    Removal Indications:    Description of Altered Skin Integrity Full thickness tissue loss. Subcutaneous fat may be visible but bone, tendon or muscle are not exposed 02/02/24 1259   Dressing Appearance Intact;Moist drainage 02/02/24 1259   Drainage Amount Moderate 02/02/24 1259   Drainage Characteristics/Odor Serosanguineous 02/02/24 1259   Appearance  Intact;Slough;Moist 02/02/24 1259   Tissue loss description Full thickness 02/02/24 1259   Periwound Area Intact 02/02/24 1259   Wound Edges Open 02/02/24 1259   Wound Length (cm) 13 cm 02/02/24 1259   Wound Width (cm) 2 cm 02/02/24 1259   Wound Depth (cm) 0.2 cm 02/02/24 1259   Wound Volume (cm^3) 5.2 cm^3 02/02/24 1259   Wound Surface Area (cm^2) 26 cm^2 02/02/24 1259   Care Cleansed with:;Wound cleanser 02/02/24 1259   Dressing Applied;Other (comment) 02/02/24 1259   Dressing Change Due 02/03/24 02/02/24 1259            Altered Skin Integrity 09/22/23 1255 Left medial Knee #6 (Active)   09/22/23 1255   Altered Skin Integrity Present on Admission - Did Patient arrive to the hospital with altered skin?: yes   Side: Left   Orientation: medial   Location: Knee   Wound Number: #6   Is this injury device related?: No   Primary Wound Type:    Description of Altered Skin Integrity:    Ankle-Brachial Index:    Pulses:    Removal Indication and Assessment:    Wound Outcome:    (Retired) Wound Length (cm):    (Retired) Wound Width (cm):    (Retired) Depth (cm):    Wound Description (Comments):    Removal Indications:    Description of Altered Skin Integrity Full thickness tissue loss. Subcutaneous fat may be visible but bone, tendon or muscle are not exposed 02/02/24 1259   Dressing Appearance Intact;Moist drainage 02/02/24 1259   Drainage Amount Moderate 02/02/24 1259   Drainage Characteristics/Odor Serosanguineous 02/02/24 1259   Appearance Intact;Moist;Epithelialization;Granulating 02/02/24 1259   Tissue loss description Full thickness 02/02/24 1259   Periwound Area Intact 02/02/24 1259   Wound Edges Open 02/02/24 1259   Wound Length (cm) 6 cm 02/02/24 1259   Wound Width (cm) 4.5 cm 02/02/24 1259   Wound Depth (cm) 0.2 cm 02/02/24 1259   Wound Volume (cm^3) 5.4 cm^3 02/02/24 1259   Wound Surface Area (cm^2) 27 cm^2 02/02/24 1259   Care Cleansed with:;Other (see comments) 02/02/24 1259   Dressing Applied;Other (comment)  02/02/24 1259   Dressing Change Due 02/03/24 02/02/24 1259            Altered Skin Integrity 11/03/23 1313 Right Heel (Active)   11/03/23 1313   Altered Skin Integrity Present on Admission - Did Patient arrive to the hospital with altered skin?: yes   Side: Right   Orientation:    Location: Heel   Wound Number:    Is this injury device related?: No   Primary Wound Type:    Description of Altered Skin Integrity:    Ankle-Brachial Index:    Pulses:    Removal Indication and Assessment:    Wound Outcome:    (Retired) Wound Length (cm):    (Retired) Wound Width (cm):    (Retired) Depth (cm):    Wound Description (Comments):    Removal Indications:    Description of Altered Skin Integrity Full thickness tissue loss. Subcutaneous fat may be visible but bone, tendon or muscle are not exposed 02/02/24 1259   Dressing Appearance Moist drainage 02/02/24 1259   Drainage Amount Moderate 02/02/24 1259   Drainage Characteristics/Odor Serosanguineous 02/02/24 1259   Appearance Intact;Moist 02/02/24 1259   Tissue loss description Full thickness 02/02/24 1259   Periwound Area Intact;Dry 02/02/24 1259   Wound Edges Open 02/02/24 1259   Wound Length (cm) 2.4 cm 02/02/24 1259   Wound Width (cm) 1.8 cm 02/02/24 1259   Wound Depth (cm) 0.2 cm 02/02/24 1259   Wound Volume (cm^3) 0.864 cm^3 02/02/24 1259   Wound Surface Area (cm^2) 4.32 cm^2 02/02/24 1259   Care Cleansed with:;Wound cleanser 02/02/24 1259   Dressing Applied;Other (comment) 02/02/24 1259   Dressing Change Due 02/03/24 02/02/24 1259            Altered Skin Integrity 11/03/23 1313 Left Heel (Active)   11/03/23 1313   Altered Skin Integrity Present on Admission - Did Patient arrive to the hospital with altered skin?: yes   Side: Left   Orientation:    Location: Heel   Wound Number:    Is this injury device related?: No   Primary Wound Type:    Description of Altered Skin Integrity:    Ankle-Brachial Index:    Pulses:    Removal Indication and Assessment:    Wound Outcome:     (Retired) Wound Length (cm):    (Retired) Wound Width (cm):    (Retired) Depth (cm):    Wound Description (Comments):    Removal Indications:    Description of Altered Skin Integrity Full thickness tissue loss. Subcutaneous fat may be visible but bone, tendon or muscle are not exposed 02/02/24 1259   Dressing Appearance Intact;Moist drainage 02/02/24 1259   Drainage Amount Moderate 02/02/24 1259   Drainage Characteristics/Odor Serosanguineous 02/02/24 1259   Appearance Intact;Moist 02/02/24 1259   Tissue loss description Full thickness 02/02/24 1259   Periwound Area Intact;Dry 02/02/24 1259   Wound Edges Open 02/02/24 1259   Wound Length (cm) 4 cm 02/02/24 1259   Wound Width (cm) 2.2 cm 02/02/24 1259   Wound Depth (cm) 0.2 cm 02/02/24 1259   Wound Volume (cm^3) 1.76 cm^3 02/02/24 1259   Wound Surface Area (cm^2) 8.8 cm^2 02/02/24 1259   Care Cleansed with:;Wound cleanser 02/02/24 1259   Dressing Applied;Other (comment) 02/02/24 1259   Dressing Change Due 02/03/24 02/02/24 1259            Altered Skin Integrity 12/08/23 1140 Left lower;lateral Leg (Active)   12/08/23 1140   Altered Skin Integrity Present on Admission - Did Patient arrive to the hospital with altered skin?: yes   Side: Left   Orientation: lower;lateral   Location: Leg   Wound Number:    Is this injury device related?:    Primary Wound Type:    Description of Altered Skin Integrity:    Ankle-Brachial Index:    Pulses:    Removal Indication and Assessment:    Wound Outcome:    (Retired) Wound Length (cm):    (Retired) Wound Width (cm):    (Retired) Depth (cm):    Wound Description (Comments):    Removal Indications:    Description of Altered Skin Integrity Full thickness tissue loss. Subcutaneous fat may be visible but bone, tendon or muscle are not exposed 02/02/24 1259   Dressing Appearance Moist drainage;Intact 02/02/24 1259   Drainage Amount Moderate 02/02/24 1259   Drainage Characteristics/Odor Serosanguineous 02/02/24 1259   Appearance  Intact;Moist;Epithelialization;Granulating 02/02/24 1259   Tissue loss description Full thickness 02/02/24 1259   Periwound Area Intact 02/02/24 1259   Wound Edges Open 02/02/24 1259   Wound Length (cm) 5.7 cm 02/02/24 1259   Wound Width (cm) 3.1 cm 02/02/24 1259   Wound Depth (cm) 0.2 cm 02/02/24 1259   Wound Volume (cm^3) 3.534 cm^3 02/02/24 1259   Wound Surface Area (cm^2) 17.67 cm^2 02/02/24 1259   Care Cleansed with:;Wound cleanser 02/02/24 1259   Dressing Applied;Other (comment) 02/02/24 1259            Altered Skin Integrity 02/02/24 1304 Left lower;medial Leg (Active)   02/02/24 1304   Altered Skin Integrity Present on Admission - Did Patient arrive to the hospital with altered skin?:    Side: Left   Orientation: lower;medial   Location: Leg   Wound Number:    Is this injury device related?: No   Primary Wound Type:    Description of Altered Skin Integrity:    Ankle-Brachial Index:    Pulses:    Removal Indication and Assessment:    Wound Outcome:    (Retired) Wound Length (cm):    (Retired) Wound Width (cm):    (Retired) Depth (cm):    Wound Description (Comments):    Removal Indications:    Description of Altered Skin Integrity Full thickness tissue loss. Subcutaneous fat may be visible but bone, tendon or muscle are not exposed 02/02/24 1259   Dressing Appearance Intact;Moist drainage 02/02/24 1259   Drainage Amount Moderate 02/02/24 1259   Drainage Characteristics/Odor Serosanguineous 02/02/24 1259   Appearance Intact;Slough;Moist 02/02/24 1259   Tissue loss description Full thickness 02/02/24 1259   Periwound Area Intact;Dry 02/02/24 1259   Wound Edges Open 02/02/24 1259   Wound Length (cm) 2 cm 02/02/24 1259   Wound Width (cm) 1.2 cm 02/02/24 1259   Wound Depth (cm) 0.2 cm 02/02/24 1259   Wound Volume (cm^3) 0.48 cm^3 02/02/24 1259   Wound Surface Area (cm^2) 2.4 cm^2 02/02/24 1259   Care Cleansed with:;Wound cleanser 02/02/24 1259   Dressing Applied;Other (comment) 02/02/24 1259   Dressing Change Due  02/03/24 02/02/24 1259       [REMOVED]      Altered Skin Integrity 09/22/23 1256 Left lateral Foot #14 (Removed)   09/22/23 1256   Altered Skin Integrity Present on Admission - Did Patient arrive to the hospital with altered skin?: yes   Side: Left   Orientation: lateral   Location: Foot   Wound Number: #14   Is this injury device related?: No   Primary Wound Type:    Description of Altered Skin Integrity:    Ankle-Brachial Index:    Pulses:    Removal Indication and Assessment:    Wound Outcome: Healed   (Retired) Wound Length (cm):    (Retired) Wound Width (cm):    (Retired) Depth (cm):    Wound Description (Comments):    Removal Indications:    Removed 02/02/24 1200   Dressing Appearance Dry 02/02/24 1259   Drainage Amount None 02/02/24 1259   Appearance Other (see comments) 02/02/24 1259   Tissue loss description Not applicable 02/02/24 1259   Periwound Area Intact;Dry 02/02/24 1259   Wound Edges Rolled/closed 02/02/24 1259   Wound Length (cm) 0 cm 02/02/24 1259   Wound Width (cm) 0 cm 02/02/24 1259   Wound Depth (cm) 0 cm 02/02/24 1259   Wound Volume (cm^3) 0 cm^3 02/02/24 1259   Wound Surface Area (cm^2) 0 cm^2 02/02/24 1259     11/3/2023                                                        Xeroform, ABD pad/gentle foam border dressing, tape   CT      2/3/24              Back                                                             Sacrum                                                        Right lower leg                                          Right upper posterior arm                               Right posterior thigh                                             Left elbow                                                       Left posterior knee                                                Left upper chest                              NO PIC  Left lower arm                                                      Left lower lateral leg                                          Left medial knee                                               Left lateral foot (HEALED)                Right heel                                                            Left heel                                                             Left lower medial leg (NEW)          Left hip (DTI)- Monitor    Assessment:           ICD-10-CM ICD-9-CM   1. Pressure injury of right leg, stage 4  L89.894 707.09     707.24   2. Pressure injury of sacral region, stage 4  L89.154 707.03     707.24   3. Pressure injury of right upper back, stage 4  L89.114 707.02     707.24   4. Pressure injury of left leg, stage 4  L89.894 707.09     707.24   5. Open wound of right elbow, subsequent encounter  S51.001D V58.89     881.01         Procedures:   Silver nitrate x 8    Excisional debridement performed:  [] Yes [] No   Selective debridement performed:  [] Yes      No   Mechanical debridement performed:  [x] Yes [] No   Silver nitrate applied :   [x] Yes [] No   Labs ordered this visit:   [] Yes [] No   Imaging ordered this visit:   [] Yes [] No   Tissue pathology and/or culture taken:  [] Yes [] No         [facility]:  [facility]     Since 2/6/2023     [phone]     ORDERS:  Back wound: Cleanse with wound cleanser, apply xeroform, ABD pad, and tape to be changed daily   Right posterior thigh wound: Cleanse with wound cleanser, apply xeroform, ABD pad, and tape to be changed daily   Sacral wound: Cleanse with wound cleanser, apply xeroform, ABD pad, and tape to be changed daily    Right lower leg wound: Cleanse with wound cleanser, apply xeroform, and gentle foam border dressing to be changed daily   Right upper posterior arm wound: Cleanse with wound cleanser, apply xeroform, and gentle foam border dressing to be changed daily   Left elbow wound: Cleanse with wound cleanser, apply xeroform, silver alginate, and gentle foam border dressing to be changed daily   Left posterior knee wound: Cleanse  with wound cleanser, apply xeroform, and gentle foam border dressing to be changed daily   Left upper chest wound: Cleanse with wound cleanser, apply xeroform, and gentle foam border dressing to be changed daily  Left lower arm: Cleanse with wound cleanser, apply xeroform, and gentle foam border dressing to be changed daily  Left medial knee: Cleanse with wound cleanser, apply xeroform, and gentle foam border dressing to be changed daily  Left lateral foot- HEALED  Right heel: Cleanse with wound cleanser, apply silver alginate to wound bed and gentle foam border dressing to be changed daily  Left heel:  Cleanse with wound cleanser, apply double layer silver alginate to wound bed and gentle foam border dressing to be changed daily  Left lower lateral leg: Cleanse with wound cleanser, apply xeroform, and gentle foam border dressing to be changed daily    He will return here in 1 month.    Hydrocodone, 10 mg, number 90

## 2024-02-02 NOTE — TELEPHONE ENCOUNTER
----- Message from Quita Boogie MD sent at 2/2/2024 12:25 PM CST -----  Fianl urine culture confirms bacterial urinary tract infection, resistant to Cipro, intermediate coverage to Macrobid, discontinue Cipro, Rx Macrobid sent to take as directed, repeat UA in 1 week, if UTI persists, he will need to go to ER for evaluation for IV antibiotics, or if symptoms persist or worsen, he will need to go to ER sooner. Thanks.

## 2024-02-15 NOTE — TELEPHONE ENCOUNTER
----- Message from Dawn Reilly sent at 2/15/2024 11:36 AM CST -----  Regarding: call back  .Type:  Needs Medical Advice    Who Called: pt wife  Symptoms (please be specific):    How long has patient had these symptoms:    Pharmacy name and phone #:    Would the patient rather a call back or a response via [facility]? Call back   Best Call Back Number: [phone]  Additional Information: pt wife called to report pt had an appointment today but he is in the hospital

## 2024-02-20 NOTE — ANESTHESIA POSTPROCEDURE EVALUATION
Anesthesia Post Evaluation    Patient: Modesto Payton    Procedure(s) Performed: Procedure(s) (LRB):  CYSTOSCOPY (N/A)    Final Anesthesia Type: general (/Regional//MAC)      Patient location during evaluation: PACU  Post-procedure mental status: @ basline.  Post-procedure vital signs: reviewed and stable  Pain management: adequate    PONV status: See postop meds for drugs used to control n/v if any.  Anesthetic complications: no      Cardiovascular status: blood pressure returned to baseline  Respiratory status: @ baseline.  Hydration status: euvolemic            Vitals Value Taken Time   BP 90/62 03/17/23 1430   Temp 96 03/18/23 1932   Pulse 114 03/17/23 1430   Resp 15 03/17/23 1430   SpO2 97 % 03/17/23 1430   Vitals shown include unvalidated device data.      Event Time   Out of Recovery 03/17/2023 14:00:00         Pain/Jonah Score: Pain Rating Prior to Med Admin: 6 (3/18/2023  4:51 AM)  Pain Rating Post Med Admin: 0 (3/18/2023  5:21 AM)  Jonah Score: 8 (3/17/2023  6:00 PM)         alert

## 2024-02-23 NOTE — PROGRESS NOTES
C3 nurse spoke with Modesto Payton family members Isra and Morena for a TCC post hospital discharge follow up call. The patient does not have a scheduled HOSFU appointment with Quita Boogie MD within 5-7 days post hospital discharge date 2/18/24. C3 nurse was unable to schedule HOSFU appointment in [facility].    Message sent to PCP staff requesting they contact patient and schedule follow up appointment.

## 2024-02-26 NOTE — ED NOTES
Per Dr. Street, D/C CT, any further blood draws and any procedure that causes undue stress and strain on the patient.
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
## 2024-02-26 NOTE — PROGRESS NOTES
Received consult for inpatient hospice. FOC signed by Aunt/ Healthcare POA Darcy Tierney [phone] for [facility]. Copy of POA scanned in chart and placed on physical chart. Copy of FOC signed in chart. Referral sent via careport. Awaiting response.

## 2024-02-27 NOTE — ED NOTES
Patent admitted to inpatient hospice.  No more VS, monitors or lab draws per order of MD due to palliative care

## 2024-02-29 LAB
BACTERIA UR CULT: ABNORMAL
BACTERIA UR CULT: ABNORMAL

## 2024-03-02 LAB
BACTERIA BLD CULT: NORMAL
BACTERIA BLD CULT: NORMAL

## 2024-03-21 ENCOUNTER — DOCUMENT SCAN (OUTPATIENT)
Dept: HOME HEALTH SERVICES | Facility: HOSPITAL | Age: 49
End: 2024-03-21
Payer: MEDICARE

## 2024-07-09 NOTE — TELEPHONE ENCOUNTER
----- Message from Jack Wilson sent at 8/17/2023  2:57 PM CDT -----  .Type:  Needs Medical Advice    Who Called: ileana Galvez  Symptoms (please be specific):    How long has patient had these symptoms:    Pharmacy name and phone #:    Would the patient rather a call back or a response via [facility]?   Best Call Back Number: [phone]  Additional Information: Requested a call back from nurse re: medication questions she has did not go into any details with me.      
Pt's caregiver had questions about his iron! Voiced to her which one was correct.   
English

## 2024-09-09 NOTE — TELEPHONE ENCOUNTER
----- Message from Rabia Karla sent at 7/28/2022  3:23 PM CDT -----  Regarding: med refill  .Type:  RX Refill Request    Who Called: pt  Refill or New Rx:refill  RX Name and Strength:zolpidem (AMBIEN) 10 mg Tab  How is the patient currently taking it? (ex. 1XDay):  Is this a 30 day or 90 day RX:  Preferred Pharmacy with phone number:[id number] - [place], [place] - [address]  Local or Mail Order:local  Ordering Provider: Chuyita  Would the patient rather a call back or a response via [facility]? Call back  Best Call Back Number:[phone]  Additional Information:         Chronic history of back pain prior history of severe spinal stenosis and disc herination s/p surgery   Home medications of tramadol 50 mg twice daily ROBACIN 500 mg TID for back pain  Reports improvement in back pain with pain medications.     Plan:  Continue Multimodal pain regimen:   Scheduled medications of Tramadol 50 mg BID and ROBACIN 500 mg TID   Along with the following PRN medications PRN oycodone 2.5 mg, Home Robaxin 3 times daily, Topical Voltaren gel, Aqua K-pad,         Lidocaine patch  Continue to monitor   PT/OT recommendations appreciated

## (undated) DEVICE — GLOVE PROTEXIS HYDROGEL SZ8.5

## (undated) DEVICE — BAG DRAIN UROLOGY AND HOSE

## (undated) DEVICE — Device

## (undated) DEVICE — SOL IRRI STRL WATER 1000ML

## (undated) DEVICE — KIT CANIST SUCTION 1200CC

## (undated) DEVICE — TIP SUCTION YANKAUER

## (undated) DEVICE — CONTAINER SPECIMEN SCREW 4OZ

## (undated) DEVICE — SENSOR DUAL FLEX STR 150CM

## (undated) DEVICE — FORCEP BX LG CAP 2.8MMX240CM

## (undated) DEVICE — ELECTRODE PATIENT RETURN DISP

## (undated) DEVICE — ELECTRODE REM PLYHSV RETURN 9

## (undated) DEVICE — KIT SURGICAL TURNOVER

## (undated) DEVICE — BOWL STERILE LARGE 32OZ

## (undated) DEVICE — SOL IRRIGATION WATER 3000ML

## (undated) DEVICE — POSITIONER HEAD ADULT

## (undated) DEVICE — COLLECTION SPECIMEN NEPTUNE

## (undated) DEVICE — KIT SURGICAL COLON .25 1.1OZ

## (undated) DEVICE — TUBING O2 FEMALE CONN 13FT

## (undated) DEVICE — CYSTOSCOPE ASCOPE 4 STD DEFL

## (undated) DEVICE — TRAY SKIN SCRUB WET PREMIUM

## (undated) DEVICE — SUPPORT ULNA NERVE PROTECTOR

## (undated) DEVICE — PROBE CIRCLER FIRE APC

## (undated) DEVICE — BAG LABGUARD BIOHAZARD 6X9IN